# Patient Record
Sex: FEMALE | Race: WHITE | NOT HISPANIC OR LATINO | Employment: OTHER | URBAN - METROPOLITAN AREA
[De-identification: names, ages, dates, MRNs, and addresses within clinical notes are randomized per-mention and may not be internally consistent; named-entity substitution may affect disease eponyms.]

---

## 2018-10-27 ENCOUNTER — HOSPITAL ENCOUNTER (EMERGENCY)
Facility: HOSPITAL | Age: 53
Discharge: HOME | End: 2018-10-27
Attending: EMERGENCY MEDICINE
Payer: COMMERCIAL

## 2018-10-27 VITALS
HEIGHT: 66 IN | TEMPERATURE: 98 F | DIASTOLIC BLOOD PRESSURE: 89 MMHG | BODY MASS INDEX: 38.62 KG/M2 | HEART RATE: 93 BPM | OXYGEN SATURATION: 95 % | RESPIRATION RATE: 18 BRPM | WEIGHT: 240.31 LBS | SYSTOLIC BLOOD PRESSURE: 131 MMHG

## 2018-10-27 DIAGNOSIS — J20.9 BRONCHITIS, ACUTE, WITH BRONCHOSPASM: Primary | ICD-10-CM

## 2018-10-27 PROCEDURE — 99284 EMERGENCY DEPT VISIT MOD MDM: CPT | Mod: 25

## 2018-10-27 PROCEDURE — 94640 AIRWAY INHALATION TREATMENT: CPT

## 2018-10-27 PROCEDURE — 63600175 PHARM REV CODE 636 W HCPCS: Performed by: EMERGENCY MEDICINE

## 2018-10-27 PROCEDURE — 25000003 PHARM REV CODE 250: Performed by: EMERGENCY MEDICINE

## 2018-10-27 PROCEDURE — 25000242 PHARM REV CODE 250 ALT 637 W/ HCPCS: Performed by: EMERGENCY MEDICINE

## 2018-10-27 RX ORDER — LEVOFLOXACIN 500 MG/1
500 TABLET, FILM COATED ORAL DAILY
Qty: 10 TABLET | Refills: 0 | Status: SHIPPED | OUTPATIENT
Start: 2018-10-27 | End: 2018-11-06

## 2018-10-27 RX ORDER — LOSARTAN POTASSIUM AND HYDROCHLOROTHIAZIDE 12.5; 1 MG/1; MG/1
1 TABLET ORAL DAILY
Status: ON HOLD | COMMUNITY
End: 2022-12-19 | Stop reason: HOSPADM

## 2018-10-27 RX ORDER — METHOTREXATE 2.5 MG/1
TABLET ORAL
COMMUNITY
End: 2022-12-10 | Stop reason: SDUPTHER

## 2018-10-27 RX ORDER — ALBUTEROL SULFATE 90 UG/1
2 AEROSOL, METERED RESPIRATORY (INHALATION) EVERY 6 HOURS PRN
Qty: 18 G | Refills: 11 | Status: SHIPPED | OUTPATIENT
Start: 2018-10-27 | End: 2019-10-27

## 2018-10-27 RX ORDER — PREDNISONE 20 MG/1
60 TABLET ORAL
Status: COMPLETED | OUTPATIENT
Start: 2018-10-27 | End: 2018-10-27

## 2018-10-27 RX ORDER — IPRATROPIUM BROMIDE AND ALBUTEROL SULFATE 2.5; .5 MG/3ML; MG/3ML
3 SOLUTION RESPIRATORY (INHALATION)
Status: COMPLETED | OUTPATIENT
Start: 2018-10-27 | End: 2018-10-27

## 2018-10-27 RX ORDER — MOXIFLOXACIN HYDROCHLORIDE 400 MG/1
400 TABLET ORAL ONCE
Status: COMPLETED | OUTPATIENT
Start: 2018-10-27 | End: 2018-10-27

## 2018-10-27 RX ORDER — AMLODIPINE BESYLATE 5 MG/1
5 TABLET ORAL DAILY
Status: ON HOLD | COMMUNITY
End: 2022-12-19 | Stop reason: HOSPADM

## 2018-10-27 RX ORDER — METHYLPREDNISOLONE 4 MG/1
TABLET ORAL
Qty: 1 PACKAGE | Refills: 0 | Status: SHIPPED | OUTPATIENT
Start: 2018-10-27 | End: 2018-11-17

## 2018-10-27 RX ADMIN — IPRATROPIUM BROMIDE AND ALBUTEROL SULFATE 3 ML: .5; 3 SOLUTION RESPIRATORY (INHALATION) at 02:10

## 2018-10-27 RX ADMIN — PREDNISONE 60 MG: 20 TABLET ORAL at 02:10

## 2018-10-27 RX ADMIN — MOXIFLOXACIN HYDROCHLORIDE 400 MG: 400 TABLET, FILM COATED ORAL at 02:10

## 2018-10-27 NOTE — ED PROVIDER NOTES
SCRIBE #1 NOTE: I, Corrie Gucci, am scribing for, and in the presence of, Raj Ladd MD. I have scribed the entire note.         History     Chief Complaint   Patient presents with    Shortness of Breath     shortness of breath x 3 weeks       Review of patient's allergies indicates:  No Known Allergies      History of Present Illness   HPI    10/27/2018, 2:13 AM  History obtained from the patient      History of Present Illness: Becky Steen is a 53 y.o. female patient who presents to the Emergency Department for SOB which onset gradually 3 weeks ago. Patient reports being on abx a few weeks ago for bronchitis which improved sxs, but sxs have now returned. Symptoms are intermittent and moderate in severity. No mitigating or exacerbating factors reported. Associated sxs include cough and congestion. Patient states she is out of her inhaler. Patient denies any fever, chills, CP, leg swelling, dizziness, N/V, extremity weakness/numbness, and all other sxs at this time. No further complaints or concerns at this time.     Arrival mode: Personal vehicle     PCP: SANDEEP Perdue      Past Medical History:  Past medical history reviewed not relevant      Past Surgical History:  Past surgical history reviewed not relevant      Family History:  Family history reviewed not relevant      Social History:  Social History    Social History Main Topics    Social History Main Topics    Smoking status: Unknown if ever smoked    Smokeless tobacco: Unknown if ever used    Alcohol Use: Unknown drinking history    Drug Use: Unknown if ever used    Sexual Activity: Unknown        Review of Systems   Review of Systems   Constitutional: Negative for chills and fever.   HENT: Positive for congestion. Negative for sore throat.    Respiratory: Positive for cough and shortness of breath.    Cardiovascular: Negative for chest pain and leg swelling.   Gastrointestinal: Negative for nausea and vomiting.   Genitourinary:  "Negative for dysuria.   Musculoskeletal: Negative for back pain.   Skin: Negative for rash.   Neurological: Negative for dizziness, weakness and numbness.   Hematological: Does not bruise/bleed easily.   All other systems reviewed and are negative.       Physical Exam     Initial Vitals [10/27/18 0144]   BP Pulse Resp Temp SpO2   131/89 105 (!) 21 98 °F (36.7 °C) (!) 94 %      MAP       --          Physical Exam  Nursing Notes and Vital Signs Reviewed.  Constitutional: Patient is in no acute distress. Well-developed and well-nourished.  Head: Atraumatic. Normocephalic.  Eyes: PERRL. EOM intact. Conjunctivae are not pale. No scleral icterus.  ENT: Mucous membranes are moist. Oropharynx is clear and symmetric.    Neck: Supple. Full ROM. No lymphadenopathy.  Cardiovascular: Regular rate. Regular rhythm. No murmurs, rubs, or gallops. Distal pulses are 2+ and symmetric.  Pulmonary/Chest: No respiratory distress. Clear to auscultation bilaterally. No wheezing or rales.  Abdominal: Soft and non-distended.  There is no tenderness.  No rebound, guarding, or rigidity. Good bowel sounds.  Genitourinary: No CVA tenderness  Musculoskeletal: Moves all extremities. No obvious deformities. No edema. No calf tenderness.  Skin: Warm and dry.  Neurological:  Alert, awake, and appropriate.  Normal speech.  No acute focal neurological deficits are appreciated.  Psychiatric: Normal affect. Good eye contact. Appropriate in content.     ED Course   Procedures  ED Vital Signs:  Vitals:    10/27/18 0144   BP: 131/89   Pulse: 105   Resp: (!) 21   Temp: 98 °F (36.7 °C)   TempSrc: Oral   SpO2: (!) 94%   Weight: 109 kg (240 lb 4.8 oz)   Height: 5' 6" (1.676 m)               The Emergency Provider reviewed the vital signs and test results, which are outlined above.     ED Discussion     2:18 AM: Initial evaluation of patient. Will treat patient with abx secondary to patient recently being treated with abx of bronchitis and is high risk patient. " Discussed with pt all pertinent ED information. Discussed pt dx and plan of tx. Gave pt all f/u and return to the ED instructions. All questions and concerns were addressed at this time. Pt expresses understanding of information and instructions, and is comfortable with plan to discharge. Pt is stable for discharge.    I discussed with patient and/or family/caretaker that evaluation in the ED does not suggest any emergent or life threatening medical conditions requiring immediate intervention beyond what was provided in the ED, and I believe patient is safe for discharge.  Regardless, an unremarkable evaluation in the ED does not preclude the development or presence of a serious of life threatening condition. As such, patient was instructed to return immediately for any worsening or change in current symptoms.          ED Medication(s):  Medications   moxifloxacin tablet 400 mg (not administered)   albuterol-ipratropium 2.5 mg-0.5 mg/3 mL nebulizer solution 3 mL (not administered)   predniSONE tablet 60 mg (not administered)     Current Discharge Medication List      START taking these medications    Details   albuterol (PROVENTIL/VENTOLIN HFA) 90 mcg/actuation inhaler Inhale 2 puffs into the lungs every 6 (six) hours as needed for Wheezing.  Qty: 18 g, Refills: 11      levoFLOXacin (LEVAQUIN) 500 MG tablet Take 1 tablet (500 mg total) by mouth once daily. for 10 days  Qty: 10 tablet, Refills: 0      methylPREDNISolone (MEDROL DOSEPACK) 4 mg tablet Taper as directed  Qty: 1 Package, Refills: 0             Follow-up Information     SANDEEP Perdue. Call in 2 days.    Specialty:  Family Medicine  Contact information:  [address] [address] [phone]             [facility] - .    Specialty:  Emergency Medicine  Why:  If symptoms worsen  Contact information:  [address]  [facility] [address] [phone]                       Medical Decision Making                 Scribe Attestation:   Scribe #1: I performed the above scribed service and the documentation accurately describes the services I performed. I attest to the accuracy of the note.     Attending:   Physician Attestation Statement for Scribe #1: I, Raj Ladd MD, personally performed the services described in this documentation, as scribed by Corrie Duckworth, in my presence, and it is both accurate and complete.           Clinical Impression       ICD-10-CM ICD-9-CM   1. Bronchitis, acute, with bronchospasm J20.9 466.0       Disposition:   Disposition: Discharged  Condition: Stable         Raj Ladd MD  10/27/18 0439

## 2022-12-10 ENCOUNTER — HOSPITAL ENCOUNTER (INPATIENT)
Facility: HOSPITAL | Age: 57
LOS: 9 days | Discharge: HOME | DRG: 822 | End: 2022-12-19
Attending: EMERGENCY MEDICINE | Admitting: HOSPITALIST
Payer: COMMERCIAL

## 2022-12-10 DIAGNOSIS — R10.9 ABDOMINAL PAIN: ICD-10-CM

## 2022-12-10 DIAGNOSIS — R19.03 RIGHT LOWER QUADRANT ABDOMINAL MASS: ICD-10-CM

## 2022-12-10 DIAGNOSIS — Z97.8 NASOGASTRIC TUBE PRESENT: ICD-10-CM

## 2022-12-10 DIAGNOSIS — K56.609 INTESTINAL OBSTRUCTION, UNSPECIFIED CAUSE, UNSPECIFIED WHETHER PARTIAL OR COMPLETE: Primary | ICD-10-CM

## 2022-12-10 DIAGNOSIS — R07.9 CHEST PAIN: ICD-10-CM

## 2022-12-10 DIAGNOSIS — R53.81 DEBILITY: ICD-10-CM

## 2022-12-10 PROBLEM — I10 PRIMARY HYPERTENSION: Status: ACTIVE | Noted: 2022-12-10

## 2022-12-10 PROBLEM — K21.9 GASTROESOPHAGEAL REFLUX DISEASE WITHOUT ESOPHAGITIS: Status: ACTIVE | Noted: 2022-12-10

## 2022-12-10 PROBLEM — R10.84 GENERALIZED ABDOMINAL PAIN: Status: ACTIVE | Noted: 2022-12-10

## 2022-12-10 PROBLEM — Z79.4 TYPE 2 DIABETES MELLITUS, WITH LONG-TERM CURRENT USE OF INSULIN: Status: ACTIVE | Noted: 2022-12-10

## 2022-12-10 PROBLEM — E11.9 TYPE 2 DIABETES MELLITUS, WITH LONG-TERM CURRENT USE OF INSULIN: Status: ACTIVE | Noted: 2022-12-10

## 2022-12-10 PROBLEM — J45.909 ASTHMA: Status: ACTIVE | Noted: 2022-12-10

## 2022-12-10 PROBLEM — E66.01 CLASS 2 SEVERE OBESITY DUE TO EXCESS CALORIES WITH SERIOUS COMORBIDITY AND BODY MASS INDEX (BMI) OF 36.0 TO 36.9 IN ADULT: Status: ACTIVE | Noted: 2022-12-10

## 2022-12-10 LAB
ALBUMIN SERPL BCP-MCNC: 4.2 G/DL (ref 3.5–5.2)
ALP SERPL-CCNC: 74 U/L (ref 55–135)
ALT SERPL W/O P-5'-P-CCNC: 18 U/L (ref 10–44)
ANION GAP SERPL CALC-SCNC: 14 MMOL/L (ref 8–16)
AST SERPL-CCNC: 21 U/L (ref 10–40)
BACTERIA #/AREA URNS AUTO: ABNORMAL /HPF
BASOPHILS # BLD AUTO: 0.03 K/UL (ref 0–0.2)
BASOPHILS NFR BLD: 0.3 % (ref 0–1.9)
BILIRUB SERPL-MCNC: 1.7 MG/DL (ref 0.1–1)
BILIRUB UR QL STRIP: NEGATIVE
BNP SERPL-MCNC: <10 PG/ML (ref 0–99)
BUN SERPL-MCNC: 13 MG/DL (ref 6–20)
CALCIUM SERPL-MCNC: 10.2 MG/DL (ref 8.7–10.5)
CHLORIDE SERPL-SCNC: 102 MMOL/L (ref 95–110)
CLARITY UR REFRACT.AUTO: CLEAR
CO2 SERPL-SCNC: 21 MMOL/L (ref 23–29)
COLOR UR AUTO: YELLOW
CREAT SERPL-MCNC: 0.7 MG/DL (ref 0.5–1.4)
DIFFERENTIAL METHOD: ABNORMAL
EOSINOPHIL # BLD AUTO: 0.1 K/UL (ref 0–0.5)
EOSINOPHIL NFR BLD: 0.9 % (ref 0–8)
ERYTHROCYTE [DISTWIDTH] IN BLOOD BY AUTOMATED COUNT: 14 % (ref 11.5–14.5)
EST. GFR  (NO RACE VARIABLE): >60 ML/MIN/1.73 M^2
GLUCOSE SERPL-MCNC: 116 MG/DL (ref 70–110)
GLUCOSE UR QL STRIP: NEGATIVE
HCT VFR BLD AUTO: 41.8 % (ref 37–48.5)
HGB BLD-MCNC: 13.6 G/DL (ref 12–16)
HGB UR QL STRIP: NEGATIVE
IMM GRANULOCYTES # BLD AUTO: 0.02 K/UL (ref 0–0.04)
IMM GRANULOCYTES NFR BLD AUTO: 0.2 % (ref 0–0.5)
KETONES UR QL STRIP: NEGATIVE
LEUKOCYTE ESTERASE UR QL STRIP: ABNORMAL
LIPASE SERPL-CCNC: 7 U/L (ref 4–60)
LYMPHOCYTES # BLD AUTO: 1.2 K/UL (ref 1–4.8)
LYMPHOCYTES NFR BLD: 11.9 % (ref 18–48)
MCH RBC QN AUTO: 28.8 PG (ref 27–31)
MCHC RBC AUTO-ENTMCNC: 32.5 G/DL (ref 32–36)
MCV RBC AUTO: 88 FL (ref 82–98)
MICROSCOPIC COMMENT: ABNORMAL
MONOCYTES # BLD AUTO: 0.9 K/UL (ref 0.3–1)
MONOCYTES NFR BLD: 9 % (ref 4–15)
NEUTROPHILS # BLD AUTO: 7.6 K/UL (ref 1.8–7.7)
NEUTROPHILS NFR BLD: 77.7 % (ref 38–73)
NITRITE UR QL STRIP: NEGATIVE
NRBC BLD-RTO: 0 /100 WBC
PH UR STRIP: 6 [PH] (ref 5–8)
PLATELET # BLD AUTO: 377 K/UL (ref 150–450)
PMV BLD AUTO: 10.7 FL (ref 9.2–12.9)
POTASSIUM SERPL-SCNC: 3.8 MMOL/L (ref 3.5–5.1)
PROT SERPL-MCNC: 7.9 G/DL (ref 6–8.4)
PROT UR QL STRIP: ABNORMAL
RBC # BLD AUTO: 4.73 M/UL (ref 4–5.4)
RBC #/AREA URNS AUTO: 3 /HPF (ref 0–4)
SODIUM SERPL-SCNC: 137 MMOL/L (ref 136–145)
SP GR UR STRIP: 1.02 (ref 1–1.03)
SQUAMOUS #/AREA URNS AUTO: 10 /HPF
TROPONIN I SERPL DL<=0.01 NG/ML-MCNC: <0.006 NG/ML (ref 0–0.03)
URN SPEC COLLECT METH UR: ABNORMAL
UROBILINOGEN UR STRIP-ACNC: NEGATIVE EU/DL
WBC # BLD AUTO: 9.8 K/UL (ref 3.9–12.7)
WBC #/AREA URNS AUTO: 6 /HPF (ref 0–5)

## 2022-12-10 PROCEDURE — 85025 COMPLETE CBC W/AUTO DIFF WBC: CPT | Mod: ER | Performed by: EMERGENCY MEDICINE

## 2022-12-10 PROCEDURE — 25000003 PHARM REV CODE 250: Mod: ER | Performed by: INTERNAL MEDICINE

## 2022-12-10 PROCEDURE — 94761 N-INVAS EAR/PLS OXIMETRY MLT: CPT | Mod: ER

## 2022-12-10 PROCEDURE — 27000221 HC OXYGEN, UP TO 24 HOURS: Mod: ER

## 2022-12-10 PROCEDURE — 25000003 PHARM REV CODE 250: Mod: ER | Performed by: EMERGENCY MEDICINE

## 2022-12-10 PROCEDURE — 96361 HYDRATE IV INFUSION ADD-ON: CPT | Mod: ER

## 2022-12-10 PROCEDURE — 82378 CARCINOEMBRYONIC ANTIGEN: CPT | Performed by: SURGERY

## 2022-12-10 PROCEDURE — 86304 IMMUNOASSAY TUMOR CA 125: CPT | Performed by: SURGERY

## 2022-12-10 PROCEDURE — 99900035 HC TECH TIME PER 15 MIN (STAT): Mod: ER

## 2022-12-10 PROCEDURE — 96365 THER/PROPH/DIAG IV INF INIT: CPT | Mod: ER

## 2022-12-10 PROCEDURE — 63600175 PHARM REV CODE 636 W HCPCS: Performed by: HOSPITALIST

## 2022-12-10 PROCEDURE — 96375 TX/PRO/DX INJ NEW DRUG ADDON: CPT | Mod: ER

## 2022-12-10 PROCEDURE — 83880 ASSAY OF NATRIURETIC PEPTIDE: CPT | Mod: ER | Performed by: EMERGENCY MEDICINE

## 2022-12-10 PROCEDURE — 36415 COLL VENOUS BLD VENIPUNCTURE: CPT | Performed by: SURGERY

## 2022-12-10 PROCEDURE — 86803 HEPATITIS C AB TEST: CPT | Performed by: EMERGENCY MEDICINE

## 2022-12-10 PROCEDURE — 63600175 PHARM REV CODE 636 W HCPCS: Mod: ER | Performed by: INTERNAL MEDICINE

## 2022-12-10 PROCEDURE — 99285 EMERGENCY DEPT VISIT HI MDM: CPT | Mod: 25,ER

## 2022-12-10 PROCEDURE — 63600175 PHARM REV CODE 636 W HCPCS: Mod: ER | Performed by: EMERGENCY MEDICINE

## 2022-12-10 PROCEDURE — 25500020 PHARM REV CODE 255: Mod: ER | Performed by: EMERGENCY MEDICINE

## 2022-12-10 PROCEDURE — 87389 HIV-1 AG W/HIV-1&-2 AB AG IA: CPT | Performed by: EMERGENCY MEDICINE

## 2022-12-10 PROCEDURE — 84484 ASSAY OF TROPONIN QUANT: CPT | Mod: ER | Performed by: EMERGENCY MEDICINE

## 2022-12-10 PROCEDURE — 93005 ELECTROCARDIOGRAM TRACING: CPT | Mod: ER

## 2022-12-10 PROCEDURE — 93010 ELECTROCARDIOGRAM REPORT: CPT | Mod: ,,, | Performed by: INTERNAL MEDICINE

## 2022-12-10 PROCEDURE — 83690 ASSAY OF LIPASE: CPT | Mod: ER | Performed by: EMERGENCY MEDICINE

## 2022-12-10 PROCEDURE — 81000 URINALYSIS NONAUTO W/SCOPE: CPT | Mod: ER | Performed by: EMERGENCY MEDICINE

## 2022-12-10 PROCEDURE — 11000001 HC ACUTE MED/SURG PRIVATE ROOM

## 2022-12-10 PROCEDURE — 80053 COMPREHEN METABOLIC PANEL: CPT | Mod: ER | Performed by: EMERGENCY MEDICINE

## 2022-12-10 PROCEDURE — 93010 EKG 12-LEAD: ICD-10-PCS | Mod: ,,, | Performed by: INTERNAL MEDICINE

## 2022-12-10 RX ORDER — MONTELUKAST SODIUM 10 MG/1
TABLET ORAL
COMMUNITY

## 2022-12-10 RX ORDER — INSULIN DETEMIR 100 [IU]/ML
INJECTION, SOLUTION SUBCUTANEOUS
Status: ON HOLD | COMMUNITY
Start: 2022-11-27 | End: 2022-12-19 | Stop reason: HOSPADM

## 2022-12-10 RX ORDER — ERGOCALCIFEROL 1.25 MG/1
CAPSULE ORAL
COMMUNITY

## 2022-12-10 RX ORDER — ONDANSETRON 2 MG/ML
4 INJECTION INTRAMUSCULAR; INTRAVENOUS
Status: COMPLETED | OUTPATIENT
Start: 2022-12-10 | End: 2022-12-10

## 2022-12-10 RX ORDER — ACETAMINOPHEN 650 MG/1
650 SUPPOSITORY RECTAL EVERY 6 HOURS PRN
Status: DISCONTINUED | OUTPATIENT
Start: 2022-12-10 | End: 2022-12-13

## 2022-12-10 RX ORDER — ENOXAPARIN SODIUM 100 MG/ML
40 INJECTION SUBCUTANEOUS EVERY 24 HOURS
Status: DISCONTINUED | OUTPATIENT
Start: 2022-12-11 | End: 2022-12-19 | Stop reason: HOSPADM

## 2022-12-10 RX ORDER — MORPHINE SULFATE 2 MG/ML
2 INJECTION, SOLUTION INTRAMUSCULAR; INTRAVENOUS EVERY 4 HOURS PRN
Status: DISCONTINUED | OUTPATIENT
Start: 2022-12-10 | End: 2022-12-11

## 2022-12-10 RX ORDER — IPRATROPIUM BROMIDE AND ALBUTEROL SULFATE 2.5; .5 MG/3ML; MG/3ML
3 SOLUTION RESPIRATORY (INHALATION) EVERY 6 HOURS PRN
Status: DISCONTINUED | OUTPATIENT
Start: 2022-12-10 | End: 2022-12-19 | Stop reason: HOSPADM

## 2022-12-10 RX ORDER — HYDROCODONE BITARTRATE AND ACETAMINOPHEN 5; 325 MG/1; MG/1
1 TABLET ORAL EVERY 6 HOURS PRN
Status: DISCONTINUED | OUTPATIENT
Start: 2022-12-10 | End: 2022-12-11

## 2022-12-10 RX ORDER — LORATADINE 10 MG/1
TABLET ORAL
COMMUNITY

## 2022-12-10 RX ORDER — CELECOXIB 200 MG/1
CAPSULE ORAL
Status: ON HOLD | COMMUNITY
End: 2022-12-19 | Stop reason: HOSPADM

## 2022-12-10 RX ORDER — TALC
6 POWDER (GRAM) TOPICAL NIGHTLY PRN
Status: DISCONTINUED | OUTPATIENT
Start: 2022-12-10 | End: 2022-12-19 | Stop reason: HOSPADM

## 2022-12-10 RX ORDER — ATORVASTATIN CALCIUM 20 MG/1
20 TABLET, FILM COATED ORAL
COMMUNITY
Start: 2022-10-15

## 2022-12-10 RX ORDER — ONDANSETRON 2 MG/ML
4 INJECTION INTRAMUSCULAR; INTRAVENOUS EVERY 8 HOURS PRN
Status: DISCONTINUED | OUTPATIENT
Start: 2022-12-10 | End: 2022-12-19 | Stop reason: HOSPADM

## 2022-12-10 RX ORDER — IBUPROFEN 200 MG
16 TABLET ORAL
Status: DISCONTINUED | OUTPATIENT
Start: 2022-12-10 | End: 2022-12-19 | Stop reason: HOSPADM

## 2022-12-10 RX ORDER — IBUPROFEN 200 MG
24 TABLET ORAL
Status: DISCONTINUED | OUTPATIENT
Start: 2022-12-10 | End: 2022-12-19 | Stop reason: HOSPADM

## 2022-12-10 RX ORDER — NALOXONE HCL 0.4 MG/ML
0.02 VIAL (ML) INJECTION
Status: DISCONTINUED | OUTPATIENT
Start: 2022-12-10 | End: 2022-12-19 | Stop reason: HOSPADM

## 2022-12-10 RX ORDER — ACETAMINOPHEN 325 MG/1
650 TABLET ORAL EVERY 8 HOURS PRN
Status: DISCONTINUED | OUTPATIENT
Start: 2022-12-10 | End: 2022-12-19 | Stop reason: HOSPADM

## 2022-12-10 RX ORDER — SODIUM CHLORIDE 9 MG/ML
1000 INJECTION, SOLUTION INTRAVENOUS
Status: COMPLETED | OUTPATIENT
Start: 2022-12-10 | End: 2022-12-10

## 2022-12-10 RX ORDER — HYDROCODONE BITARTRATE AND ACETAMINOPHEN 7.5; 325 MG/1; MG/1
TABLET ORAL
Status: ON HOLD | COMMUNITY
Start: 2022-07-06 | End: 2022-12-19 | Stop reason: HOSPADM

## 2022-12-10 RX ORDER — METFORMIN HYDROCHLORIDE 500 MG/1
TABLET, EXTENDED RELEASE ORAL
COMMUNITY

## 2022-12-10 RX ORDER — GLUCAGON 1 MG
1 KIT INJECTION
Status: DISCONTINUED | OUTPATIENT
Start: 2022-12-10 | End: 2022-12-19 | Stop reason: HOSPADM

## 2022-12-10 RX ORDER — SODIUM CHLORIDE 0.9 % (FLUSH) 0.9 %
3 SYRINGE (ML) INJECTION EVERY 12 HOURS PRN
Status: DISCONTINUED | OUTPATIENT
Start: 2022-12-10 | End: 2022-12-19 | Stop reason: HOSPADM

## 2022-12-10 RX ORDER — MORPHINE SULFATE 4 MG/ML
4 INJECTION, SOLUTION INTRAMUSCULAR; INTRAVENOUS
Status: ACTIVE | OUTPATIENT
Start: 2022-12-10 | End: 2022-12-10

## 2022-12-10 RX ORDER — FLUTICASONE PROPIONATE 50 MCG
SPRAY, SUSPENSION (ML) NASAL
COMMUNITY
Start: 2022-10-05

## 2022-12-10 RX ORDER — OMEPRAZOLE 40 MG/1
CAPSULE, DELAYED RELEASE ORAL
COMMUNITY

## 2022-12-10 RX ORDER — PROMETHAZINE HYDROCHLORIDE 25 MG/1
25 TABLET ORAL EVERY 6 HOURS PRN
Status: DISCONTINUED | OUTPATIENT
Start: 2022-12-10 | End: 2022-12-19 | Stop reason: HOSPADM

## 2022-12-10 RX ORDER — HYDROMORPHONE HYDROCHLORIDE 2 MG/ML
0.5 INJECTION, SOLUTION INTRAMUSCULAR; INTRAVENOUS; SUBCUTANEOUS
Status: COMPLETED | OUTPATIENT
Start: 2022-12-10 | End: 2022-12-10

## 2022-12-10 RX ORDER — BUDESONIDE AND FORMOTEROL FUMARATE DIHYDRATE 160; 4.5 UG/1; UG/1
AEROSOL RESPIRATORY (INHALATION)
COMMUNITY

## 2022-12-10 RX ORDER — SODIUM CHLORIDE, SODIUM LACTATE, POTASSIUM CHLORIDE, CALCIUM CHLORIDE 600; 310; 30; 20 MG/100ML; MG/100ML; MG/100ML; MG/100ML
INJECTION, SOLUTION INTRAVENOUS CONTINUOUS
Status: DISCONTINUED | OUTPATIENT
Start: 2022-12-10 | End: 2022-12-14

## 2022-12-10 RX ORDER — MAG HYDROX/ALUMINUM HYD/SIMETH 200-200-20
30 SUSPENSION, ORAL (FINAL DOSE FORM) ORAL 4 TIMES DAILY PRN
Status: DISCONTINUED | OUTPATIENT
Start: 2022-12-10 | End: 2022-12-19 | Stop reason: HOSPADM

## 2022-12-10 RX ADMIN — BENZOCAINE: 200 SPRAY DENTAL; ORAL; PERIODONTAL at 07:12

## 2022-12-10 RX ADMIN — SODIUM CHLORIDE 1000 ML: 0.9 INJECTION, SOLUTION INTRAVENOUS at 12:12

## 2022-12-10 RX ADMIN — HYDROMORPHONE HYDROCHLORIDE 0.5 MG: 2 INJECTION, SOLUTION INTRAMUSCULAR; INTRAVENOUS; SUBCUTANEOUS at 03:12

## 2022-12-10 RX ADMIN — SODIUM CHLORIDE, POTASSIUM CHLORIDE, SODIUM LACTATE AND CALCIUM CHLORIDE: 600; 310; 30; 20 INJECTION, SOLUTION INTRAVENOUS at 11:12

## 2022-12-10 RX ADMIN — IOHEXOL 100 ML: 350 INJECTION, SOLUTION INTRAVENOUS at 01:12

## 2022-12-10 RX ADMIN — PROMETHAZINE HYDROCHLORIDE 12.5 MG: 25 INJECTION INTRAMUSCULAR; INTRAVENOUS at 03:12

## 2022-12-10 RX ADMIN — ONDANSETRON 4 MG: 2 INJECTION INTRAMUSCULAR; INTRAVENOUS at 07:12

## 2022-12-10 RX ADMIN — SODIUM CHLORIDE 1000 ML: 0.9 INJECTION, SOLUTION INTRAVENOUS at 03:12

## 2022-12-10 RX ADMIN — ONDANSETRON 4 MG: 2 INJECTION INTRAMUSCULAR; INTRAVENOUS at 12:12

## 2022-12-10 NOTE — Clinical Note
Diagnosis: Abdominal pain [570716]   Admitting Provider:: ALONDRA IRIZARRY [[id number]]   Future Attending Provider: ALONDRA IRIZARRY [[id number]]   Reason for IP Medical Treatment  (Clinical interventions that can only be accomplished in the IP setting? ) :: Obstruction   Estimated Length of Stay:: 2 midnights   I certify that Inpatient services for greater than or equal to 2 midnights are medically necessary:: Yes   Plans for Post-Acute care--if anticipated (pick the single best option):: A. No post acute care anticipated at this time   Special Needs:: No Special Needs [1]

## 2022-12-10 NOTE — ED PROVIDER NOTES
Encounter Date: 12/10/2022       History     Chief Complaint   Patient presents with    Abdominal Pain     Upper abd pain that radiates down to the bottom of abd area, nausea, onset 2 weeks ago      The history is provided by the patient.   Abdominal Pain  The current episode started several days ago. The onset of the illness was gradual. The problem has not changed since onset.The abdominal pain is generalized. The abdominal pain does not radiate. The severity of the abdominal pain is 7/10. The abdominal pain is relieved by nothing. The other symptoms of the illness include nausea and vomiting. The other symptoms of the illness do not include fever, fatigue, jaundice, melena, diarrhea or dysuria.   Symptoms associated with the illness do not include back pain.   Review of patient's allergies indicates:  No Known Allergies  No past medical history on file.  No past surgical history on file.  No family history on file.     Review of Systems   Constitutional:  Negative for fatigue and fever.   HENT:  Negative for congestion.    Respiratory:  Negative for cough.    Cardiovascular:  Negative for chest pain.   Gastrointestinal:  Positive for abdominal pain, nausea and vomiting. Negative for diarrhea, jaundice and melena.   Genitourinary:  Negative for dysuria.   Musculoskeletal:  Negative for back pain.   Hematological:  Does not bruise/bleed easily.     Physical Exam     Initial Vitals [12/10/22 1135]   BP Pulse Resp Temp SpO2   120/80 (!) 115 20 98.4 °F (36.9 °C) (!) 94 %      MAP       --         Physical Exam    Nursing note and vitals reviewed.  Constitutional: She appears well-developed and well-nourished. No distress.   HENT:   Head: Normocephalic and atraumatic.   Mouth/Throat: Oropharynx is clear and moist.   Eyes: Conjunctivae and EOM are normal. Pupils are equal, round, and reactive to light.   Neck: Neck supple.   Normal range of motion.  Cardiovascular:  Normal rate, regular rhythm and normal heart sounds.            Pulmonary/Chest: Breath sounds normal. No respiratory distress.   Abdominal: Abdomen is soft. Bowel sounds are normal. She exhibits no distension. There is abdominal tenderness in the periumbilical area.   Musculoskeletal:         General: Normal range of motion.      Cervical back: Normal range of motion and neck supple.     Neurological: She is alert and oriented to person, place, and time. She has normal strength.   Skin: Skin is warm and dry.   Psychiatric: She has a normal mood and affect. Thought content normal.       ED Course   Procedures  Labs Reviewed   CBC W/ AUTO DIFFERENTIAL - Abnormal; Notable for the following components:       Result Value    Gran % 77.7 (*)     Lymph % 11.9 (*)     All other components within normal limits    Narrative:     Release to patient->Immediate   COMPREHENSIVE METABOLIC PANEL - Abnormal; Notable for the following components:    CO2 21 (*)     Glucose 116 (*)     Total Bilirubin 1.7 (*)     All other components within normal limits    Narrative:     Release to patient->Immediate   URINALYSIS, REFLEX TO URINE CULTURE - Abnormal; Notable for the following components:    Protein, UA Trace (*)     Leukocytes, UA Trace (*)     All other components within normal limits    Narrative:     Specimen Source->Urine   URINALYSIS MICROSCOPIC - Abnormal; Notable for the following components:    WBC, UA 6 (*)     Bacteria Few (*)     All other components within normal limits    Narrative:     Specimen Source->Urine   LIPASE    Narrative:     Release to patient->Immediate   TROPONIN I    Narrative:     Release to patient->Immediate   B-TYPE NATRIURETIC PEPTIDE    Narrative:     Release to patient->Immediate   HIV 1 / 2 ANTIBODY   HEPATITIS C ANTIBODY   HEP C VIRUS HOLD SPECIMEN     Results for orders placed or performed during the hospital encounter of 12/10/22   CBC W/ AUTO DIFFERENTIAL   Result Value Ref Range    WBC 9.80 3.90 - 12.70 K/uL    RBC 4.73 4.00 - 5.40 M/uL    Hemoglobin  13.6 12.0 - 16.0 g/dL    Hematocrit 41.8 37.0 - 48.5 %    MCV 88 82 - 98 fL    MCH 28.8 27.0 - 31.0 pg    MCHC 32.5 32.0 - 36.0 g/dL    RDW 14.0 11.5 - 14.5 %    Platelets 377 150 - 450 K/uL    MPV 10.7 9.2 - 12.9 fL    Immature Granulocytes 0.2 0.0 - 0.5 %    Gran # (ANC) 7.6 1.8 - 7.7 K/uL    Immature Grans (Abs) 0.02 0.00 - 0.04 K/uL    Lymph # 1.2 1.0 - 4.8 K/uL    Mono # 0.9 0.3 - 1.0 K/uL    Eos # 0.1 0.0 - 0.5 K/uL    Baso # 0.03 0.00 - 0.20 K/uL    nRBC 0 0 /100 WBC    Gran % 77.7 (H) 38.0 - 73.0 %    Lymph % 11.9 (L) 18.0 - 48.0 %    Mono % 9.0 4.0 - 15.0 %    Eosinophil % 0.9 0.0 - 8.0 %    Basophil % 0.3 0.0 - 1.9 %    Differential Method Automated    Comp. Metabolic Panel   Result Value Ref Range    Sodium 137 136 - 145 mmol/L    Potassium 3.8 3.5 - 5.1 mmol/L    Chloride 102 95 - 110 mmol/L    CO2 21 (L) 23 - 29 mmol/L    Glucose 116 (H) 70 - 110 mg/dL    BUN 13 6 - 20 mg/dL    Creatinine 0.7 0.5 - 1.4 mg/dL    Calcium 10.2 8.7 - 10.5 mg/dL    Total Protein 7.9 6.0 - 8.4 g/dL    Albumin 4.2 3.5 - 5.2 g/dL    Total Bilirubin 1.7 (H) 0.1 - 1.0 mg/dL    Alkaline Phosphatase 74 55 - 135 U/L    AST 21 10 - 40 U/L    ALT 18 10 - 44 U/L    Anion Gap 14 8 - 16 mmol/L    eGFR >60.0 >60 mL/min/1.73 m^2   Lipase   Result Value Ref Range    Lipase 7 4 - 60 U/L   Urinalysis, Reflex to Urine Culture Urine, Clean Catch    Specimen: Urine   Result Value Ref Range    Specimen UA Urine, Clean Catch     Color, UA Yellow Yellow, Straw, Yadira    Appearance, UA Clear Clear    pH, UA 6.0 5.0 - 8.0    Specific Gravity, UA 1.020 1.005 - 1.030    Protein, UA Trace (A) Negative    Glucose, UA Negative Negative    Ketones, UA Negative Negative    Bilirubin (UA) Negative Negative    Occult Blood UA Negative Negative    Nitrite, UA Negative Negative    Urobilinogen, UA Negative <2.0 EU/dL    Leukocytes, UA Trace (A) Negative   Troponin I   Result Value Ref Range    Troponin I <0.006 0.000 - 0.026 ng/mL   Brain natriuretic peptide    Result Value Ref Range    BNP <10 0 - 99 pg/mL   Urinalysis Microscopic   Result Value Ref Range    RBC, UA 3 0 - 4 /hpf    WBC, UA 6 (H) 0 - 5 /hpf    Bacteria Few (A) None-Occ /hpf    Squam Epithel, UA 10 /hpf    Microscopic Comment SEE COMMENT             Imaging Results              X-Ray Chest 1 View (In process)                      CT Abdomen Pelvis With Contrast (Final result)  Result time 12/10/22 14:31:42      Final result by Yeison Aldridge MD (12/10/22 14:31:42)                   Impression:      11 cm soft tissue mass either arising from the cecum or distal small bowel consistent with malignancy.  The mass causes a high-grade distal mechanical small bowel obstruction.  Positive for peritoneal carcinomatosis.    All CT scans at this facility are performed  using dose modulation techniques as appropriate to performed exam including the following:  automated exposure control; adjustment of mA and/or kV according to the patients size (this includes techniques or standardized protocols for targeted exams where dose is matched to indication/reason for exam: i.e. extremities or head);  iterative reconstruction technique.      Electronically signed by: Yeison Aldridge MD  Date:    12/10/2022  Time:    14:31               Narrative:    EXAMINATION:  CT ABDOMEN PELVIS WITH CONTRAST    CLINICAL HISTORY:  Abdominal pain, acute, nonlocalized;    TECHNIQUE:  Axial CT imaging was performed through the abdomen and pelvis with  75cc  of intravenous contrast. Multiplanar reformats were performed and interpreted.    COMPARISON:  None    FINDINGS:  Clustered micronodular opacities in the right lower lobe, likely infectious.    There is an 11.7 x 11.1 x 9.9 cm soft tissue mass located in the right lower quadrant that either arises from the cecum or a distal small bowel loops that results in high-grade mechanical small bowel obstruction.  Upstream moderate dilatation of multiple loops of small bowel with air-fluid levels.  There  are scattered soft tissue nodules throughout the right side of the mesentery and in the pelvis consistent with peritoneal carcinomatosis.  Trace perihepatic ascites.  No free air or abscess.    The gallbladder is unremarkable.    The abdominal aorta is normal in caliber.    The urinary bladder is unremarkable.    No significant osseous abnormality is identified.                                  3:33 PM Surgery Consult- discussed c Dr Davis, recs NGT, IVFs admit to Medicine    3:34 PM Discussed lab/imaging studies with patient and the need for further evaluation/admission for Bowel Obstruction ass c Mass. Pt verbalized understanding that this is a stand alone ER and we are unable to admit at this facility. Pt will be transferred to [facility] via [facility] with care en route to include IVFs. I discussed this case with HS and care was accepted by Dr Padilla.       Medications   morphine injection 4 mg (4 mg Intravenous Not Given 12/10/22 1215)   0.9%  NaCl infusion (has no administration in time range)   sodium chloride 0.9% bolus 1,000 mL (0 mLs Intravenous Stopped 12/10/22 1330)   ondansetron injection 4 mg (4 mg Intravenous Given 12/10/22 1216)   iohexoL (OMNIPAQUE 350) injection 100 mL (100 mLs Intravenous Given 12/10/22 1332)   promethazine (PHENERGAN) 12.5 mg in dextrose 5 % 50 mL IVPB (0 mg Intravenous Stopped 12/10/22 1525)   HYDROmorphone (PF) injection 0.5 mg (0.5 mg Intravenous Given 12/10/22 1513)                              Clinical Impression:   Final diagnoses:  [R10.9] Abdominal pain  [K56.609] Intestinal obstruction, unspecified cause, unspecified whether partial or complete (Primary)  [Z97.8] Nasogastric tube present  [R19.03] Right lower quadrant abdominal mass        ED Disposition Condition    Admit Stable                Raj Ladd MD  12/10/22 1539

## 2022-12-11 ENCOUNTER — ANESTHESIA EVENT (OUTPATIENT)
Dept: SURGERY | Facility: HOSPITAL | Age: 57
DRG: 822 | End: 2022-12-11
Payer: COMMERCIAL

## 2022-12-11 ENCOUNTER — ANESTHESIA (OUTPATIENT)
Dept: SURGERY | Facility: HOSPITAL | Age: 57
DRG: 822 | End: 2022-12-11
Payer: COMMERCIAL

## 2022-12-11 PROBLEM — R11.2 NAUSEA AND VOMITING: Status: ACTIVE | Noted: 2022-12-11

## 2022-12-11 PROBLEM — R19.03 RIGHT LOWER QUADRANT ABDOMINAL MASS: Status: ACTIVE | Noted: 2022-12-11

## 2022-12-11 LAB
ALBUMIN SERPL BCP-MCNC: 3.4 G/DL (ref 3.5–5.2)
ALP SERPL-CCNC: 65 U/L (ref 55–135)
ALT SERPL W/O P-5'-P-CCNC: 12 U/L (ref 10–44)
ANION GAP SERPL CALC-SCNC: 9 MMOL/L (ref 8–16)
AST SERPL-CCNC: 17 U/L (ref 10–40)
BASOPHILS # BLD AUTO: 0.04 K/UL (ref 0–0.2)
BASOPHILS NFR BLD: 0.5 % (ref 0–1.9)
BILIRUB SERPL-MCNC: 1.6 MG/DL (ref 0.1–1)
BUN SERPL-MCNC: 12 MG/DL (ref 6–20)
CALCIUM SERPL-MCNC: 8.9 MG/DL (ref 8.7–10.5)
CANCER AG125 SERPL-ACNC: 31 U/ML (ref 0–30)
CEA SERPL-MCNC: <1.7 NG/ML (ref 0–5)
CHLORIDE SERPL-SCNC: 104 MMOL/L (ref 95–110)
CO2 SERPL-SCNC: 26 MMOL/L (ref 23–29)
CREAT SERPL-MCNC: 0.7 MG/DL (ref 0.5–1.4)
DIFFERENTIAL METHOD: ABNORMAL
EOSINOPHIL # BLD AUTO: 0.4 K/UL (ref 0–0.5)
EOSINOPHIL NFR BLD: 4.8 % (ref 0–8)
ERYTHROCYTE [DISTWIDTH] IN BLOOD BY AUTOMATED COUNT: 14.3 % (ref 11.5–14.5)
EST. GFR  (NO RACE VARIABLE): >60 ML/MIN/1.73 M^2
ESTIMATED AVG GLUCOSE: 123 MG/DL (ref 68–131)
GLUCOSE SERPL-MCNC: 95 MG/DL (ref 70–110)
HBA1C MFR BLD: 5.9 % (ref 4–5.6)
HCT VFR BLD AUTO: 37.7 % (ref 37–48.5)
HCV AB SERPL QL IA: NEGATIVE
HEP C VIRUS HOLD SPECIMEN: NORMAL
HGB BLD-MCNC: 12 G/DL (ref 12–16)
HIV 1+2 AB+HIV1 P24 AG SERPL QL IA: NEGATIVE
IMM GRANULOCYTES # BLD AUTO: 0.03 K/UL (ref 0–0.04)
IMM GRANULOCYTES NFR BLD AUTO: 0.4 % (ref 0–0.5)
LYMPHOCYTES # BLD AUTO: 1.5 K/UL (ref 1–4.8)
LYMPHOCYTES NFR BLD: 19.7 % (ref 18–48)
MCH RBC QN AUTO: 28.7 PG (ref 27–31)
MCHC RBC AUTO-ENTMCNC: 31.8 G/DL (ref 32–36)
MCV RBC AUTO: 90 FL (ref 82–98)
MONOCYTES # BLD AUTO: 0.9 K/UL (ref 0.3–1)
MONOCYTES NFR BLD: 11.7 % (ref 4–15)
NEUTROPHILS # BLD AUTO: 4.7 K/UL (ref 1.8–7.7)
NEUTROPHILS NFR BLD: 62.9 % (ref 38–73)
NRBC BLD-RTO: 0 /100 WBC
PLATELET # BLD AUTO: 316 K/UL (ref 150–450)
PMV BLD AUTO: 10.2 FL (ref 9.2–12.9)
POCT GLUCOSE: 108 MG/DL (ref 70–110)
POCT GLUCOSE: 125 MG/DL (ref 70–110)
POCT GLUCOSE: 136 MG/DL (ref 70–110)
POCT GLUCOSE: 143 MG/DL (ref 70–110)
POCT GLUCOSE: 87 MG/DL (ref 70–110)
POTASSIUM SERPL-SCNC: 4.1 MMOL/L (ref 3.5–5.1)
PROT SERPL-MCNC: 6.3 G/DL (ref 6–8.4)
RBC # BLD AUTO: 4.18 M/UL (ref 4–5.4)
SODIUM SERPL-SCNC: 139 MMOL/L (ref 136–145)
WBC # BLD AUTO: 7.53 K/UL (ref 3.9–12.7)

## 2022-12-11 PROCEDURE — 37000009 HC ANESTHESIA EA ADD 15 MINS: Performed by: SURGERY

## 2022-12-11 PROCEDURE — 44130 BOWEL TO BOWEL FUSION: CPT | Mod: 51,80,, | Performed by: SURGERY

## 2022-12-11 PROCEDURE — 49204 PR EXCISION/DESTRUCTION OPEN ABDOMINAL TUMORS 5.1-10.0 CM: CPT | Mod: 80,,, | Performed by: SURGERY

## 2022-12-11 PROCEDURE — 63600175 PHARM REV CODE 636 W HCPCS: Performed by: SURGERY

## 2022-12-11 PROCEDURE — 88341 PR IHC OR ICC EACH ADD'L SINGLE ANTIBODY  STAINPR: ICD-10-PCS | Mod: 26,,, | Performed by: PATHOLOGY

## 2022-12-11 PROCEDURE — 63600175 PHARM REV CODE 636 W HCPCS: Performed by: ANESTHESIOLOGY

## 2022-12-11 PROCEDURE — 88305 TISSUE EXAM BY PATHOLOGIST: CPT | Performed by: PATHOLOGY

## 2022-12-11 PROCEDURE — 49204 PR EXCISION/DESTRUCTION OPEN ABDOMINAL TUMORS 5.1-10.0 CM: ICD-10-PCS | Mod: ,,, | Performed by: SURGERY

## 2022-12-11 PROCEDURE — 27000221 HC OXYGEN, UP TO 24 HOURS

## 2022-12-11 PROCEDURE — 99223 PR INITIAL HOSPITAL CARE,LEVL III: ICD-10-PCS | Mod: ,,, | Performed by: INTERNAL MEDICINE

## 2022-12-11 PROCEDURE — 94799 UNLISTED PULMONARY SVC/PX: CPT

## 2022-12-11 PROCEDURE — C9290 INJ, BUPIVACAINE LIPOSOME: HCPCS | Performed by: SURGERY

## 2022-12-11 PROCEDURE — 36000707: Performed by: SURGERY

## 2022-12-11 PROCEDURE — 80053 COMPREHEN METABOLIC PANEL: CPT | Performed by: HOSPITALIST

## 2022-12-11 PROCEDURE — 88341 IMHCHEM/IMCYTCHM EA ADD ANTB: CPT | Performed by: PATHOLOGY

## 2022-12-11 PROCEDURE — C1769 GUIDE WIRE: HCPCS | Performed by: SURGERY

## 2022-12-11 PROCEDURE — 85025 COMPLETE CBC W/AUTO DIFF WBC: CPT | Performed by: HOSPITALIST

## 2022-12-11 PROCEDURE — 52332 PR CYSTOSCOPY,INSERT URETERAL STENT: ICD-10-PCS | Mod: RT,,, | Performed by: UROLOGY

## 2022-12-11 PROCEDURE — 49204 PR EXCISION/DESTRUCTION OPEN ABDOMINAL TUMORS 5.1-10.0 CM: CPT | Mod: ,,, | Performed by: SURGERY

## 2022-12-11 PROCEDURE — 27201423 OPTIME MED/SURG SUP & DEVICES STERILE SUPPLY: Performed by: SURGERY

## 2022-12-11 PROCEDURE — 88341 IMHCHEM/IMCYTCHM EA ADD ANTB: CPT | Mod: 59 | Performed by: PATHOLOGY

## 2022-12-11 PROCEDURE — 99900035 HC TECH TIME PER 15 MIN (STAT)

## 2022-12-11 PROCEDURE — 99223 1ST HOSP IP/OBS HIGH 75: CPT | Mod: ,,, | Performed by: INTERNAL MEDICINE

## 2022-12-11 PROCEDURE — 25000003 PHARM REV CODE 250: Performed by: FAMILY MEDICINE

## 2022-12-11 PROCEDURE — 49204 PR EXCISION/DESTRUCTION OPEN ABDOMINAL TUMORS 5.1-10.0 CM: ICD-10-PCS | Mod: 80,,, | Performed by: SURGERY

## 2022-12-11 PROCEDURE — 37000008 HC ANESTHESIA 1ST 15 MINUTES: Performed by: SURGERY

## 2022-12-11 PROCEDURE — 63600175 PHARM REV CODE 636 W HCPCS: Performed by: FAMILY MEDICINE

## 2022-12-11 PROCEDURE — 25000003 PHARM REV CODE 250: Performed by: NURSE PRACTITIONER

## 2022-12-11 PROCEDURE — 88342 CHG IMMUNOCYTOCHEMISTRY: ICD-10-PCS | Mod: 26,,, | Performed by: PATHOLOGY

## 2022-12-11 PROCEDURE — 88342 IMHCHEM/IMCYTCHM 1ST ANTB: CPT | Performed by: PATHOLOGY

## 2022-12-11 PROCEDURE — 83036 HEMOGLOBIN GLYCOSYLATED A1C: CPT | Performed by: HOSPITALIST

## 2022-12-11 PROCEDURE — 44130 PR BOWEL TO BOWEL ANASTOMOSIS: ICD-10-PCS | Mod: 51,,, | Performed by: SURGERY

## 2022-12-11 PROCEDURE — 25000003 PHARM REV CODE 250: Performed by: HOSPITALIST

## 2022-12-11 PROCEDURE — 88377 M/PHMTRC ALYS ISHQUANT/SEMIQ: CPT | Performed by: PATHOLOGY

## 2022-12-11 PROCEDURE — 74420 PR  X-RAY RETROGRADE PYELOGRAM: ICD-10-PCS | Mod: 26,,, | Performed by: UROLOGY

## 2022-12-11 PROCEDURE — 44130 PR BOWEL TO BOWEL ANASTOMOSIS: ICD-10-PCS | Mod: 51,80,, | Performed by: SURGERY

## 2022-12-11 PROCEDURE — 88365 INSITU HYBRIDIZATION (FISH): CPT | Performed by: PATHOLOGY

## 2022-12-11 PROCEDURE — 36000706: Performed by: SURGERY

## 2022-12-11 PROCEDURE — 44130 BOWEL TO BOWEL FUSION: CPT | Mod: 51,,, | Performed by: SURGERY

## 2022-12-11 PROCEDURE — 36415 COLL VENOUS BLD VENIPUNCTURE: CPT | Performed by: HOSPITALIST

## 2022-12-11 PROCEDURE — 74420 UROGRAPHY RTRGR +-KUB: CPT | Mod: 26,,, | Performed by: UROLOGY

## 2022-12-11 PROCEDURE — 99223 1ST HOSP IP/OBS HIGH 75: CPT | Mod: 57,,, | Performed by: SURGERY

## 2022-12-11 PROCEDURE — 11000001 HC ACUTE MED/SURG PRIVATE ROOM

## 2022-12-11 PROCEDURE — 71000033 HC RECOVERY, INTIAL HOUR: Performed by: SURGERY

## 2022-12-11 PROCEDURE — 52332 CYSTOSCOPY AND TREATMENT: CPT | Mod: RT,,, | Performed by: UROLOGY

## 2022-12-11 PROCEDURE — 88342 IMHCHEM/IMCYTCHM 1ST ANTB: CPT | Mod: 91 | Performed by: PATHOLOGY

## 2022-12-11 PROCEDURE — 94761 N-INVAS EAR/PLS OXIMETRY MLT: CPT

## 2022-12-11 PROCEDURE — 88342 IMHCHEM/IMCYTCHM 1ST ANTB: CPT | Mod: 26,,, | Performed by: PATHOLOGY

## 2022-12-11 PROCEDURE — 88305 TISSUE EXAM BY PATHOLOGIST: CPT | Mod: 26,,, | Performed by: PATHOLOGY

## 2022-12-11 PROCEDURE — 99223 PR INITIAL HOSPITAL CARE,LEVL III: ICD-10-PCS | Mod: 57,,, | Performed by: SURGERY

## 2022-12-11 PROCEDURE — 88365 INSITU HYBRIDIZATION (FISH): CPT | Mod: 26,,, | Performed by: PATHOLOGY

## 2022-12-11 PROCEDURE — C1758 CATHETER, URETERAL: HCPCS | Performed by: SURGERY

## 2022-12-11 PROCEDURE — 88341 IMHCHEM/IMCYTCHM EA ADD ANTB: CPT | Mod: 26,,, | Performed by: PATHOLOGY

## 2022-12-11 PROCEDURE — 25500020 PHARM REV CODE 255: Performed by: UROLOGY

## 2022-12-11 PROCEDURE — C2617 STENT, NON-COR, TEM W/O DEL: HCPCS | Performed by: SURGERY

## 2022-12-11 PROCEDURE — 88305 TISSUE EXAM BY PATHOLOGIST: ICD-10-PCS | Mod: 26,,, | Performed by: PATHOLOGY

## 2022-12-11 PROCEDURE — 25000003 PHARM REV CODE 250: Performed by: SURGERY

## 2022-12-11 PROCEDURE — 88365 PR  TISSUE HYBRIDIZATION: ICD-10-PCS | Mod: 26,,, | Performed by: PATHOLOGY

## 2022-12-11 RX ORDER — ATORVASTATIN CALCIUM 10 MG/1
20 TABLET, FILM COATED ORAL DAILY
Status: DISCONTINUED | OUTPATIENT
Start: 2022-12-11 | End: 2022-12-19 | Stop reason: HOSPADM

## 2022-12-11 RX ORDER — PROPOFOL 10 MG/ML
VIAL (ML) INTRAVENOUS
Status: DISCONTINUED | OUTPATIENT
Start: 2022-12-11 | End: 2022-12-11

## 2022-12-11 RX ORDER — ALBUTEROL SULFATE 0.83 MG/ML
2.5 SOLUTION RESPIRATORY (INHALATION) EVERY 4 HOURS PRN
Status: DISCONTINUED | OUTPATIENT
Start: 2022-12-11 | End: 2022-12-11 | Stop reason: HOSPADM

## 2022-12-11 RX ORDER — MUPIROCIN 20 MG/G
OINTMENT TOPICAL 2 TIMES DAILY
Status: DISPENSED | OUTPATIENT
Start: 2022-12-12 | End: 2022-12-17

## 2022-12-11 RX ORDER — HYDROCHLOROTHIAZIDE 12.5 MG/1
12.5 TABLET ORAL DAILY
Status: DISCONTINUED | OUTPATIENT
Start: 2022-12-11 | End: 2022-12-19

## 2022-12-11 RX ORDER — LOSARTAN POTASSIUM 50 MG/1
100 TABLET ORAL DAILY
Status: DISCONTINUED | OUTPATIENT
Start: 2022-12-11 | End: 2022-12-19

## 2022-12-11 RX ORDER — DIPHENHYDRAMINE HYDROCHLORIDE 50 MG/ML
25 INJECTION INTRAMUSCULAR; INTRAVENOUS EVERY 6 HOURS PRN
Status: DISCONTINUED | OUTPATIENT
Start: 2022-12-11 | End: 2022-12-11 | Stop reason: HOSPADM

## 2022-12-11 RX ORDER — ONDANSETRON 2 MG/ML
4 INJECTION INTRAMUSCULAR; INTRAVENOUS ONCE AS NEEDED
Status: DISCONTINUED | OUTPATIENT
Start: 2022-12-11 | End: 2022-12-11 | Stop reason: HOSPADM

## 2022-12-11 RX ORDER — SUCCINYLCHOLINE CHLORIDE 20 MG/ML
INJECTION INTRAMUSCULAR; INTRAVENOUS
Status: DISCONTINUED | OUTPATIENT
Start: 2022-12-11 | End: 2022-12-11

## 2022-12-11 RX ORDER — DIPHENHYDRAMINE HCL 25 MG
25 CAPSULE ORAL EVERY 6 HOURS PRN
Status: DISCONTINUED | OUTPATIENT
Start: 2022-12-11 | End: 2022-12-19 | Stop reason: HOSPADM

## 2022-12-11 RX ORDER — ONDANSETRON 2 MG/ML
INJECTION INTRAMUSCULAR; INTRAVENOUS
Status: DISCONTINUED | OUTPATIENT
Start: 2022-12-11 | End: 2022-12-11

## 2022-12-11 RX ORDER — GLUCAGON 1 MG
1 KIT INJECTION
Status: DISCONTINUED | OUTPATIENT
Start: 2022-12-11 | End: 2022-12-19 | Stop reason: HOSPADM

## 2022-12-11 RX ORDER — HYDROCODONE BITARTRATE AND ACETAMINOPHEN 5; 325 MG/1; MG/1
1 TABLET ORAL
Status: DISCONTINUED | OUTPATIENT
Start: 2022-12-11 | End: 2022-12-11 | Stop reason: HOSPADM

## 2022-12-11 RX ORDER — AMLODIPINE BESYLATE 5 MG/1
5 TABLET ORAL DAILY
Status: DISCONTINUED | OUTPATIENT
Start: 2022-12-11 | End: 2022-12-19

## 2022-12-11 RX ORDER — INSULIN ASPART 100 [IU]/ML
1-10 INJECTION, SOLUTION INTRAVENOUS; SUBCUTANEOUS EVERY 6 HOURS PRN
Status: DISCONTINUED | OUTPATIENT
Start: 2022-12-11 | End: 2022-12-19 | Stop reason: HOSPADM

## 2022-12-11 RX ORDER — EPHEDRINE SULFATE 50 MG/ML
INJECTION, SOLUTION INTRAVENOUS
Status: DISCONTINUED | OUTPATIENT
Start: 2022-12-11 | End: 2022-12-11

## 2022-12-11 RX ORDER — HYDROMORPHONE HCL IN 0.9% NACL 6 MG/30 ML
PATIENT CONTROLLED ANALGESIA SYRINGE INTRAVENOUS CONTINUOUS
Status: DISCONTINUED | OUTPATIENT
Start: 2022-12-11 | End: 2022-12-11

## 2022-12-11 RX ORDER — PANTOPRAZOLE SODIUM 40 MG/1
40 TABLET, DELAYED RELEASE ORAL DAILY
Status: DISCONTINUED | OUTPATIENT
Start: 2022-12-11 | End: 2022-12-19 | Stop reason: HOSPADM

## 2022-12-11 RX ORDER — ROCURONIUM BROMIDE 10 MG/ML
INJECTION, SOLUTION INTRAVENOUS
Status: DISCONTINUED | OUTPATIENT
Start: 2022-12-11 | End: 2022-12-11

## 2022-12-11 RX ORDER — FENTANYL CITRATE 50 UG/ML
25 INJECTION, SOLUTION INTRAMUSCULAR; INTRAVENOUS EVERY 5 MIN PRN
Status: DISCONTINUED | OUTPATIENT
Start: 2022-12-11 | End: 2022-12-11 | Stop reason: HOSPADM

## 2022-12-11 RX ORDER — FENTANYL CITRATE 50 UG/ML
INJECTION, SOLUTION INTRAMUSCULAR; INTRAVENOUS
Status: DISCONTINUED | OUTPATIENT
Start: 2022-12-11 | End: 2022-12-11

## 2022-12-11 RX ORDER — DEXAMETHASONE SODIUM PHOSPHATE 4 MG/ML
INJECTION, SOLUTION INTRA-ARTICULAR; INTRALESIONAL; INTRAMUSCULAR; INTRAVENOUS; SOFT TISSUE
Status: DISCONTINUED | OUTPATIENT
Start: 2022-12-11 | End: 2022-12-11

## 2022-12-11 RX ORDER — LIDOCAINE HYDROCHLORIDE 20 MG/ML
INJECTION, SOLUTION EPIDURAL; INFILTRATION; INTRACAUDAL; PERINEURAL
Status: DISCONTINUED | OUTPATIENT
Start: 2022-12-11 | End: 2022-12-11

## 2022-12-11 RX ORDER — NALOXONE HCL 0.4 MG/ML
0.02 VIAL (ML) INJECTION
Status: DISCONTINUED | OUTPATIENT
Start: 2022-12-11 | End: 2022-12-19 | Stop reason: HOSPADM

## 2022-12-11 RX ORDER — BUPIVACAINE HYDROCHLORIDE 2.5 MG/ML
INJECTION, SOLUTION EPIDURAL; INFILTRATION; INTRACAUDAL
Status: DISCONTINUED | OUTPATIENT
Start: 2022-12-11 | End: 2022-12-11 | Stop reason: HOSPADM

## 2022-12-11 RX ADMIN — SODIUM CHLORIDE, POTASSIUM CHLORIDE, SODIUM LACTATE AND CALCIUM CHLORIDE: 600; 310; 30; 20 INJECTION, SOLUTION INTRAVENOUS at 04:12

## 2022-12-11 RX ADMIN — EPHEDRINE SULFATE 25 MG: 50 INJECTION INTRAVENOUS at 02:12

## 2022-12-11 RX ADMIN — LOSARTAN POTASSIUM 100 MG: 50 TABLET, FILM COATED ORAL at 09:12

## 2022-12-11 RX ADMIN — FENTANYL CITRATE 25 MCG: 50 INJECTION INTRAMUSCULAR; INTRAVENOUS at 02:12

## 2022-12-11 RX ADMIN — SUCCINYLCHOLINE CHLORIDE 120 MG: 20 INJECTION, SOLUTION INTRAMUSCULAR; INTRAVENOUS at 12:12

## 2022-12-11 RX ADMIN — EPHEDRINE SULFATE 25 MG: 50 INJECTION INTRAVENOUS at 01:12

## 2022-12-11 RX ADMIN — PANTOPRAZOLE SODIUM 40 MG: 40 TABLET, DELAYED RELEASE ORAL at 09:12

## 2022-12-11 RX ADMIN — PROPOFOL 200 MG: 10 INJECTION, EMULSION INTRAVENOUS at 12:12

## 2022-12-11 RX ADMIN — AMLODIPINE BESYLATE 5 MG: 5 TABLET ORAL at 09:12

## 2022-12-11 RX ADMIN — HYDROCHLOROTHIAZIDE 12.5 MG: 12.5 TABLET ORAL at 09:12

## 2022-12-11 RX ADMIN — FENTANYL CITRATE 100 MCG: 50 INJECTION, SOLUTION INTRAMUSCULAR; INTRAVENOUS at 12:12

## 2022-12-11 RX ADMIN — DIPHENHYDRAMINE HYDROCHLORIDE 25 MG: 25 CAPSULE ORAL at 08:12

## 2022-12-11 RX ADMIN — ENOXAPARIN SODIUM 40 MG: 40 INJECTION SUBCUTANEOUS at 05:12

## 2022-12-11 RX ADMIN — HYDROMORPHONE HYDROCHLORIDE: 2 INJECTION INTRAMUSCULAR; INTRAVENOUS; SUBCUTANEOUS at 04:12

## 2022-12-11 RX ADMIN — ONDANSETRON 4 MG: 2 INJECTION, SOLUTION INTRAMUSCULAR; INTRAVENOUS at 02:12

## 2022-12-11 RX ADMIN — LIDOCAINE HYDROCHLORIDE 50 MG: 20 INJECTION, SOLUTION EPIDURAL; INFILTRATION; INTRACAUDAL; PERINEURAL at 12:12

## 2022-12-11 RX ADMIN — ROCURONIUM BROMIDE 100 MG: 10 INJECTION, SOLUTION INTRAVENOUS at 12:12

## 2022-12-11 RX ADMIN — ATORVASTATIN CALCIUM 20 MG: 10 TABLET, FILM COATED ORAL at 09:12

## 2022-12-11 RX ADMIN — DEXAMETHASONE SODIUM PHOSPHATE 4 MG: 4 INJECTION, SOLUTION INTRAMUSCULAR; INTRAVENOUS at 02:12

## 2022-12-11 RX ADMIN — CEFAZOLIN 2 G: 1 INJECTION, POWDER, FOR SOLUTION INTRAMUSCULAR; INTRAVENOUS at 12:12

## 2022-12-11 RX ADMIN — SODIUM CHLORIDE, SODIUM LACTATE, POTASSIUM CHLORIDE, AND CALCIUM CHLORIDE: .6; .31; .03; .02 INJECTION, SOLUTION INTRAVENOUS at 12:12

## 2022-12-11 NOTE — CONSULTS
[facility]  General Surgery  Consult Note    Patient Name: Becky Steen  MRN: [id number]  Code Status: Full Code  Admission Date: 12/10/2022  Hospital Length of Stay: 1 days  Attending Physician: Nehemiah Dixon MD  Primary Care Provider: SANDEEP Perdue    Patient information was obtained from patient.     Inpatient consult to General Surgery  Consult performed by: Gustabo Davis MD  Consult ordered by: Armond Yeager MD      Subjective:     Principal Problem: intraabdominal mass/bowel obstruction     History of Present Illness: 56 y/o female referred for right lower quadrant abdominal mass causing bowel obstruction. Patient reports constipation and decreased appetite. Denies any weight loss. Recently began having abdominal pain and bloating. CT in ER showing intraabdominal mass in right lower quadrant causing bowel obstruction.  Only previous abdominal/pelvic surgery was a tubal ligation.      No new subjective & objective note has been filed under this hospital service since the last note was generated.  CEA <1.7   31  Assessment/Plan:     Class 2 severe obesity due to excess calories with serious comorbidity and body mass index (BMI) of 36.0 to 36.9 in adult  Dietary modifications    Type 2 diabetes mellitus, with long-term current use of insulin  Medical mgmt    Primary hypertension  Medical mgmt    Bowel obstruction  Appears secondary to intra-abdominal mass.  Unclear etiology differential diagnosis discussed with patient.    NG tube to low intermittent wall suction   NPO, IV fluids    Exploratory laparotomy for further evaluation and treatment.  We discussed possibility of resection versus bypass depending on resectability at the time of surgery.  She may also need a diverting ostomy.  Will have ureteral stents placed  Risks and benefits discussed with patient including but not limited to: pain, bleeding, infection, scarring, injury to underlying organs/structures, leak, stricture, need for  further treatment      VTE Risk Mitigation (From admission, onward)           Ordered     enoxaparin injection 40 mg  Daily         12/10/22 2225     IP VTE HIGH RISK PATIENT  Once         12/10/22 2225     Place sequential compression device  Until discontinued         12/10/22 2225                    Thank you for your consult. I will follow-up with patient. Please contact us if you have any additional questions.     60 minutes of total time spent on the encounter, which includes face to face time and non-face to face time preparing to see the patient (eg, review of tests), Obtaining and/or reviewing separately obtained history, Documenting clinical information in the electronic or other health record, Independently interpreting results (not separately reported) and communicating results to the patient/family/caregiver, or Care coordination (not separately reported).     Gustabo Davis MD  General Surgery  [facility]

## 2022-12-11 NOTE — HPI
58 y/o female referred for right lower quadrant abdominal mass causing bowel obstruction. Patient reports constipation and decreased appetite. Denies any weight loss. Recently began having abdominal pain and bloating. CT in ER showing intraabdominal mass in right lower quadrant causing bowel obstruction.  Only previous abdominal/pelvic surgery was a tubal ligation.

## 2022-12-11 NOTE — OP NOTE
Date of Procedure: 12/11/2022    Pre-operative Diagnosis:   1.  Bowel obstruction    Post-operative Diagnosis:   1.  same    Surgeon: Brittany Gleason MD    Assistants: None    Specimen: none    Prosthetics, Devices, Grafts: None    Anesthesia: General    Procedures:  1. Cysto with placement of right ureteral stent  2. right retrograde pyelogram  3. Fluoro less than one hour    Procedure in Detail:  After proper consents were obtained, the patient was prepped and draped in normal sterile fashion in the dorsal lithotomy position.  The 22 Fr cystoscope was assembled and introduced per urethra.  The bladder was inspected. A 5-Urdu open-ended catheter was advanced into the right ureteral orifice and gentle retrograde pyelogram was performed, revealing a delicate ureter and renal collecting system without hydronephrosis. I advanced a glidewire through the pollock catheter into the renal pelvis under fluoroscopic guidance.  The pollock was backloaded.  A 7Fr x 26cm stent was then passed over the wire and when the wire was withdrawn fluoroscopy confirmed good placement with a curl in the stent on both ends.  The cystoscope was then reinserted to the bladder which was drained and the scope was then withdrawn and set aside. A garcias catheter was placed and the suture attached to the stent was taped to the garcias for easy removal following her surgery.    Blood Loss: none    Fluids: Per anesthesia.    Drains: 7x26cm right ureteral stent    Complications: None.

## 2022-12-11 NOTE — ASSESSMENT & PLAN NOTE
Patient's FSGs are controlled on current medication regimen.  Last A1c reviewed-   Lab Results   Component Value Date    HGBA1C 5.9 (H) 12/11/2022     Most recent fingerstick glucose reviewed-   Recent Labs   Lab 12/11/22  0556   POCTGLUCOSE 87     Current correctional scale  low  titrate anti-hyperglycemic dose as follows-   Antihyperglycemics (From admission, onward)    Start     Stop Route Frequency Ordered    12/11/22 0321  insulin aspart U-100 pen 1-10 Units         -- SubQ Every 6 hours PRN 12/11/22 0222      Plan:  -SSI  -Continue home insulin at decreased dose and titrate as needed  -Hold oral hypoglycemics while patient is in the hospital  -Hypoglycemic protocol

## 2022-12-11 NOTE — ASSESSMENT & PLAN NOTE
Currently normotensive.  Plan:  -Optimize pain control   -Continue home medications, titrate as needed   -Monitor BP  -Low salt/cardiac diet when not NPO  -IV hydralazine prn for SBP>160 or DBP>90       Continue current regimen and adjust accordingly

## 2022-12-11 NOTE — PLAN OF CARE
Patient remains injury free.  No signs or symptoms of distress noted.  Patient complaining of abdominal pain.  Pain is being managed with prescribed medication.  NG to left nare remains in place with IMWS.  Pain pump will be initiated according to orders.  All active orders has been reviewed and followed.  12 hour chart check completed.

## 2022-12-11 NOTE — SUBJECTIVE & OBJECTIVE
Past Medical History:   Diagnosis Date    Asthma     Diabetes mellitus     GERD (gastroesophageal reflux disease)     HLD (hyperlipidemia)     Hypertension        Past Surgical History:   Procedure Laterality Date     SECTION         Review of patient's allergies indicates:  No Known Allergies    No current facility-administered medications on file prior to encounter.     Current Outpatient Medications on File Prior to Encounter   Medication Sig    amLODIPine (NORVASC) 5 MG tablet Take 5 mg by mouth once daily.    atorvastatin (LIPITOR) 20 MG tablet Take 20 mg by mouth.    budesonide-formoterol 160-4.5 mcg (SYMBICORT) 160-4.5 mcg/actuation HFAA Symbicort 160 mcg-4.5 mcg/actuation HFA aerosol inhaler    celecoxib (CELEBREX) 200 MG capsule celecoxib 200 mg capsule   TAKE 1 CAPSULE BY MOUTH TWICE A DAY    ergocalciferol (ERGOCALCIFEROL) 50,000 unit Cap ergocalciferol (vitamin D2) 1,250 mcg (50,000 unit) capsule   Take 1 capsule every week by oral route.    fluticasone propionate (FLONASE) 50 mcg/actuation nasal spray SMARTSI Spray(s) Both Nares Every Morning    HYDROcodone-acetaminophen (NORCO) 7.5-325 mg per tablet hydrocodone 7.5 mg-acetaminophen 325 mg tablet    LEVEMIR FLEXTOUCH U-100 INSULN 100 unit/mL (3 mL) InPn pen SMARTSI Unit(s) SUB-Q Daily    loratadine (CLARITIN) 10 mg tablet loratadine 10 mg tablet   Take 1 tablet every day by oral route.    losartan-hydrochlorothiazide 100-12.5 mg (HYZAAR) 100-12.5 mg Tab Take 1 tablet by mouth once daily.    metFORMIN (GLUCOPHAGE-XR) 500 MG ER 24hr tablet metformin  mg tablet,extended release 24 hr   TAKE 1 TABLET BY MOUTH EVERY DAY    methotrexate (XATMEP ORAL) methotrexate    montelukast (SINGULAIR) 10 mg tablet montelukast 10 mg tablet    omeprazole (PRILOSEC) 40 MG capsule omeprazole 40 mg capsule,delayed release    albuterol (PROVENTIL/VENTOLIN HFA) 90 mcg/actuation inhaler Inhale 2 puffs into the lungs every 6 (six) hours as needed for  Wheezing.    [DISCONTINUED] methotrexate 2.5 MG Tab Take by mouth every 7 days.     Family History       Problem Relation (Age of Onset)    No Known Problems Mother, Father          Tobacco Use    Smoking status: Never    Smokeless tobacco: Never   Substance and Sexual Activity    Alcohol use: Not Currently    Drug use: Never    Sexual activity: Not Currently     Review of Systems   All other systems reviewed and are negative.  Objective:     Vital Signs (Most Recent):  Temp: 97.9 °F (36.6 °C) (12/10/22 2136)  Pulse: 100 (12/10/22 2136)  Resp: 17 (12/10/22 2136)  BP: 133/81 (12/10/22 2136)  SpO2: 99 % (12/10/22 2136) Vital Signs (24h Range):  Temp:  [97.9 °F (36.6 °C)-98.4 °F (36.9 °C)] 97.9 °F (36.6 °C)  Pulse:  [] 100  Resp:  [16-20] 17  SpO2:  [92 %-99 %] 99 %  BP: (100-136)/(62-88) 133/81     Weight: 100.8 kg (222 lb 1.8 oz)  Body mass index is 36.96 kg/m².    Physical Exam  Vitals reviewed.   Constitutional:       General: She is not in acute distress.     Appearance: Normal appearance. She is obese. She is not ill-appearing, toxic-appearing or diaphoretic.   HENT:      Head: Normocephalic and atraumatic.      Right Ear: External ear normal.      Left Ear: External ear normal.      Nose: Nose normal. No congestion or rhinorrhea.      Mouth/Throat:      Mouth: Mucous membranes are moist.      Pharynx: Oropharynx is clear. No oropharyngeal exudate or posterior oropharyngeal erythema.   Eyes:      General: No scleral icterus.     Extraocular Movements: Extraocular movements intact.      Conjunctiva/sclera: Conjunctivae normal.      Pupils: Pupils are equal, round, and reactive to light.   Neck:      Vascular: No carotid bruit.   Cardiovascular:      Rate and Rhythm: Normal rate and regular rhythm.      Pulses: Normal pulses.      Heart sounds: Normal heart sounds. No murmur heard.    No friction rub. No gallop.   Pulmonary:      Effort: Pulmonary effort is normal. No respiratory distress.      Breath sounds:  Normal breath sounds. No stridor. No wheezing, rhonchi or rales.   Chest:      Chest wall: No tenderness.   Abdominal:      General: There is no distension.      Palpations: Abdomen is soft.      Tenderness: There is abdominal tenderness. There is no right CVA tenderness, left CVA tenderness, guarding or rebound.      Hernia: No hernia is present.      Comments: Obese abdomen. Decreased bowel sounds noted throughout. TTP throughout without evidence of guarding or rebound tenderness noted. NGT in placed hooked to suction.    Musculoskeletal:         General: No swelling, tenderness, deformity or signs of injury. Normal range of motion.      Cervical back: Normal range of motion and neck supple. No rigidity or tenderness.      Right lower leg: No edema.      Left lower leg: No edema.   Lymphadenopathy:      Cervical: No cervical adenopathy.   Skin:     General: Skin is warm and dry.      Capillary Refill: Capillary refill takes less than 2 seconds.      Coloration: Skin is not jaundiced or pale.      Findings: No bruising, erythema, lesion or rash.   Neurological:      General: No focal deficit present.      Mental Status: She is alert and oriented to person, place, and time. Mental status is at baseline.      Cranial Nerves: No cranial nerve deficit.      Sensory: No sensory deficit.      Motor: No weakness.      Coordination: Coordination normal.   Psychiatric:         Mood and Affect: Mood normal.         Behavior: Behavior normal.         Thought Content: Thought content normal.         Judgment: Judgment normal.         CRANIAL NERVES     CN III, IV, VI   Pupils are equal, round, and reactive to light.     Significant Labs: All pertinent labs within the past 24 hours have been reviewed.    Significant Imaging: I have reviewed all pertinent imaging results/findings within the past 24 hours.    LABS:  Recent Results (from the past 24 hour(s))   Urinalysis, Reflex to Urine Culture Urine, Clean Catch    Collection  Time: 12/10/22 12:00 PM    Specimen: Urine   Result Value Ref Range    Specimen UA Urine, Clean Catch     Color, UA Yellow Yellow, Straw, Yadira    Appearance, UA Clear Clear    pH, UA 6.0 5.0 - 8.0    Specific Gravity, UA 1.020 1.005 - 1.030    Protein, UA Trace (A) Negative    Glucose, UA Negative Negative    Ketones, UA Negative Negative    Bilirubin (UA) Negative Negative    Occult Blood UA Negative Negative    Nitrite, UA Negative Negative    Urobilinogen, UA Negative <2.0 EU/dL    Leukocytes, UA Trace (A) Negative   Urinalysis Microscopic    Collection Time: 12/10/22 12:00 PM   Result Value Ref Range    RBC, UA 3 0 - 4 /hpf    WBC, UA 6 (H) 0 - 5 /hpf    Bacteria Few (A) None-Occ /hpf    Squam Epithel, UA 10 /hpf    Microscopic Comment SEE COMMENT    CBC W/ AUTO DIFFERENTIAL    Collection Time: 12/10/22 12:09 PM   Result Value Ref Range    WBC 9.80 3.90 - 12.70 K/uL    RBC 4.73 4.00 - 5.40 M/uL    Hemoglobin 13.6 12.0 - 16.0 g/dL    Hematocrit 41.8 37.0 - 48.5 %    MCV 88 82 - 98 fL    MCH 28.8 27.0 - 31.0 pg    MCHC 32.5 32.0 - 36.0 g/dL    RDW 14.0 11.5 - 14.5 %    Platelets 377 150 - 450 K/uL    MPV 10.7 9.2 - 12.9 fL    Immature Granulocytes 0.2 0.0 - 0.5 %    Gran # (ANC) 7.6 1.8 - 7.7 K/uL    Immature Grans (Abs) 0.02 0.00 - 0.04 K/uL    Lymph # 1.2 1.0 - 4.8 K/uL    Mono # 0.9 0.3 - 1.0 K/uL    Eos # 0.1 0.0 - 0.5 K/uL    Baso # 0.03 0.00 - 0.20 K/uL    nRBC 0 0 /100 WBC    Gran % 77.7 (H) 38.0 - 73.0 %    Lymph % 11.9 (L) 18.0 - 48.0 %    Mono % 9.0 4.0 - 15.0 %    Eosinophil % 0.9 0.0 - 8.0 %    Basophil % 0.3 0.0 - 1.9 %    Differential Method Automated    Comp. Metabolic Panel    Collection Time: 12/10/22 12:09 PM   Result Value Ref Range    Sodium 137 136 - 145 mmol/L    Potassium 3.8 3.5 - 5.1 mmol/L    Chloride 102 95 - 110 mmol/L    CO2 21 (L) 23 - 29 mmol/L    Glucose 116 (H) 70 - 110 mg/dL    BUN 13 6 - 20 mg/dL    Creatinine 0.7 0.5 - 1.4 mg/dL    Calcium 10.2 8.7 - 10.5 mg/dL    Total Protein  7.9 6.0 - 8.4 g/dL    Albumin 4.2 3.5 - 5.2 g/dL    Total Bilirubin 1.7 (H) 0.1 - 1.0 mg/dL    Alkaline Phosphatase 74 55 - 135 U/L    AST 21 10 - 40 U/L    ALT 18 10 - 44 U/L    Anion Gap 14 8 - 16 mmol/L    eGFR >60.0 >60 mL/min/1.73 m^2   Lipase    Collection Time: 12/10/22 12:09 PM   Result Value Ref Range    Lipase 7 4 - 60 U/L   Troponin I    Collection Time: 12/10/22 12:09 PM   Result Value Ref Range    Troponin I <0.006 0.000 - 0.026 ng/mL   Brain natriuretic peptide    Collection Time: 12/10/22 12:09 PM   Result Value Ref Range    BNP <10 0 - 99 pg/mL       RADIOLOGY  X-Ray Chest 1 View    Result Date: 12/10/2022  EXAMINATION: XR CHEST 1 VIEW CLINICAL HISTORY: Presence of other specified devices TECHNIQUE: Single frontal view of the chest was performed. COMPARISON: None FINDINGS: Satisfactory nasogastric tube.The lungs are clear, with normal appearance of pulmonary vasculature and no pleural effusion or pneumothorax. The cardiac silhouette is normal in size. The hilar and mediastinal contours are unremarkable. Bones are intact.     Satisfactory nasogastric tube with tip in the stomach. Electronically signed by: Miguelito Wilkinson Date:    12/10/2022 Time:    18:19    CT Abdomen Pelvis With Contrast    Result Date: 12/10/2022  EXAMINATION: CT ABDOMEN PELVIS WITH CONTRAST CLINICAL HISTORY: Abdominal pain, acute, nonlocalized; TECHNIQUE: Axial CT imaging was performed through the abdomen and pelvis with  75cc  of intravenous contrast. Multiplanar reformats were performed and interpreted. COMPARISON: None FINDINGS: Clustered micronodular opacities in the right lower lobe, likely infectious. There is an 11.7 x 11.1 x 9.9 cm soft tissue mass located in the right lower quadrant that either arises from the cecum or a distal small bowel loops that results in high-grade mechanical small bowel obstruction.  Upstream moderate dilatation of multiple loops of small bowel with air-fluid levels.  There are scattered soft tissue  nodules throughout the right side of the mesentery and in the pelvis consistent with peritoneal carcinomatosis.  Trace perihepatic ascites.  No free air or abscess. The gallbladder is unremarkable. The abdominal aorta is normal in caliber. The urinary bladder is unremarkable. No significant osseous abnormality is identified.     11 cm soft tissue mass either arising from the cecum or distal small bowel consistent with malignancy.  The mass causes a high-grade distal mechanical small bowel obstruction.  Positive for peritoneal carcinomatosis. All CT scans at this facility are performed  using dose modulation techniques as appropriate to performed exam including the following:  automated exposure control; adjustment of mA and/or kV according to the patients size (this includes techniques or standardized protocols for targeted exams where dose is matched to indication/reason for exam: i.e. extremities or head);  iterative reconstruction technique. Electronically signed by: Yeison Aldridge MD Date:    12/10/2022 Time:    14:31      EKG    MICROBIOLOGY    MDM

## 2022-12-11 NOTE — PROGRESS NOTES
[facility] Medicine  Progress Note    Patient Name: Becky Steen  MRN: [id number]  Patient Class: IP- Inpatient   Admission Date: 12/10/2022  Length of Stay: 1 days  Attending Physician: Nehemiah Dixon MD  Primary Care Provider: SANDEEP Perdue        Subjective:     Principal Problem:Bowel obstruction        HPI:  Becky Steen is a 57 y.o. female with a PMH  has a past medical history of Asthma, Diabetes mellitus, GERD (gastroesophageal reflux disease), HLD (hyperlipidemia), and Hypertension. who presented as a transfer from [facility] for higher level of care after patient presented with complaints of generalized abdominal pain with associated nausea and vomiting and was found to have evidence of a soft tissue mass arising from the cecum/distal small bowel causing bowel obstruction noted on CT abdomen/pelvis. Patient reported acute onset and progressively worsening generalized abdominal pain which began approximately 2 weeks prior to admission. She reported pain is intermittent in frequency and described as a sharp/labored like pains throughout her entire abdomen, currently rated 0/10 in severity but worsens to 10/10 in severity with no known alleviating or aggravating factors noted.  Associated symptoms included nausea, vomiting, constipation, and decreased oral intake but denied endorsing any fever, chills, sweats, chest pain, shortness a breath, dysuria, hematuria, hematochezia, melena, or neurological deficits.  Patient reported having small bowel movement yesterday with intermittent bowel movements throughout the week reported loose/watery nature.  Patient reported last colonoscopy was within 10 years with [facility] and was told to come back 10 years later for repeat.  Patient follows Dr. Alcala at [facility] for OBGYN with last reported mg obtain last year and Pap smear few months ago prior to admission and reported both were normal.  Patient reports history of skin  cancer in her father but is unaware of any other known family history of cancers.  Prior to onset of symptoms, patient reported being in her usual state of health no other concerns or complaints.  All other review of systems negative except as noted above.  Patient admitted to Hospital Medicine for continued medical management of bowel obstruction and further cancer workup. General Surgery aware of patient and awaiting further evaluation in the morning.     PCP: Kerry Aguilar        Overview/Hospital Course:   admitted for bowel obstruction awaiting surgery. Denies prior abnormal cscope with polypectomy. Denies hysterectomy or h maternal gu cancers. Paternal  in 80s. Reports diarrheal episodes over last 2 weeks. Passing flatus.       Interval History: See hospital course for today      Review of Systems   Constitutional:  Negative for activity change, appetite change, fatigue and fever.   Respiratory:  Negative for shortness of breath.    Cardiovascular:  Negative for chest pain.   Gastrointestinal:  Positive for abdominal pain, diarrhea and nausea.   Neurological:  Negative for weakness.   Psychiatric/Behavioral:  Negative for agitation, behavioral problems, confusion, decreased concentration and dysphoric mood. The patient is nervous/anxious.    Objective:     Vital Signs (Most Recent):  Temp: 97.3 °F (36.3 °C) (22)  Pulse: 79 (22)  Resp: 18 (22)  BP: 126/76 (22)  SpO2: 95 % (22) Vital Signs (24h Range):  Temp:  [97.3 °F (36.3 °C)-98.4 °F (36.9 °C)] 97.3 °F (36.3 °C)  Pulse:  [] 79  Resp:  [16-20] 18  SpO2:  [92 %-99 %] 95 %  BP: (100-136)/(62-88) 126/76     Weight: 100.6 kg (221 lb 12.5 oz)  Body mass index is 36.91 kg/m².    Intake/Output Summary (Last 24 hours) at 2022 1028  Last data filed at 2022 0345  Gross per 24 hour   Intake 1956.85 ml   Output --   Net 1956.85 ml      Physical Exam  Vitals and nursing note reviewed.    Constitutional:       General: She is not in acute distress.     Appearance: She is obese. She is ill-appearing. She is not toxic-appearing.   HENT:      Head: Normocephalic and atraumatic.     Cardiovascular:      Rate and Rhythm: Normal rate.   Pulmonary:      Effort: Pulmonary effort is normal. No respiratory distress.   Abdominal:      General: Bowel sounds are normal.      Palpations: Abdomen is soft.      Tenderness: There is no abdominal tenderness.   Musculoskeletal:      Right lower leg: No edema.      Left lower leg: No edema.   Skin:     General: Skin is warm.   Neurological:      Mental Status: She is alert and oriented to person, place, and time.   Psychiatric:         Mood and Affect: Mood is anxious.         Speech: Speech normal.         Behavior: Behavior normal. Behavior is cooperative.       Significant Labs: All pertinent labs within the past 24 hours have been reviewed.  CBC:   Recent Labs   Lab 12/10/22  1209 12/11/22  0544   WBC 9.80 7.53   HGB 13.6 12.0   HCT 41.8 37.7    316     CMP:   Recent Labs   Lab 12/10/22  1209 12/11/22  0544    139   K 3.8 4.1    104   CO2 21* 26   * 95   BUN 13 12   CREATININE 0.7 0.7   CALCIUM 10.2 8.9   PROT 7.9 6.3   ALBUMIN 4.2 3.4*   BILITOT 1.7* 1.6*   ALKPHOS 74 65   AST 21 17   ALT 18 12   ANIONGAP 14 9       Significant Imaging: I have reviewed all pertinent imaging results/findings within the past 24 hours.  CT: I have reviewed all pertinent results/findings within the past 24 hours and my personal findings are:  abdomen-soft tissue mass 11cm, peritoneal carinomatosis  Kub-satisfactory placement of ngt      Assessment/Plan:      * Bowel obstruction  Pending surgical intervention      Right lower quadrant abdominal mass  Awaiting surgery for possible resection  Npo  ngt     Nausea and vomiting  ngt   Surgery following      Class 2 severe obesity due to excess calories with serious comorbidity and body mass index (BMI) of 36.0 to  36.9 in adult  Body mass index is 36.96 kg/m². Elevation likely secondary to increased calorie intake and sedentary lifestyle. Patient educated on morbidity and mortality in regards to elevated BMI and stressed importance of diet and exercise.   Plan:  -low fat/low calorie diet       Asthma  Patient with history of Asthma currently on inhalers but not home oxygen. Not presently in acute exacerbation and is without signs/symptoms of distress. Patient currently saturating  99% on 2 L/min. CXR revealed satisfactory nasogastric tube with clear lungs and normal appearance of pulmonary vasculature without evidence of pleural effusion or pneumothorax.   Plan:  -Continue home medications, titrate as needed  -Titrate oxygen requirements as needed  -Incentive spirometry   -Monitor pulse oximetry  -Duonebs prn          Type 2 diabetes mellitus, with long-term current use of insulin  Patient's FSGs are controlled on current medication regimen.  Last A1c reviewed-   Lab Results   Component Value Date    HGBA1C 5.9 (H) 12/11/2022     Most recent fingerstick glucose reviewed-   Recent Labs   Lab 12/11/22  0556   POCTGLUCOSE 87     Current correctional scale  low  titrate anti-hyperglycemic dose as follows-   Antihyperglycemics (From admission, onward)    Start     Stop Route Frequency Ordered    12/11/22 0321  insulin aspart U-100 pen 1-10 Units         -- SubQ Every 6 hours PRN 12/11/22 0222      Plan:  -SSI  -Continue home insulin at decreased dose and titrate as needed  -Hold oral hypoglycemics while patient is in the hospital  -Hypoglycemic protocol     Gastroesophageal reflux disease without esophagitis  Chronic. Stable. Currently asymptomatic. Home medications include PPI/Antacids as needed.  Plan:  -Continue PPI/Antacids as needed         Primary hypertension  Currently normotensive.  Plan:  -Optimize pain control   -Continue home medications, titrate as needed   -Monitor BP  -Low salt/cardiac diet when not NPO  -IV  hydralazine prn for SBP>160 or DBP>90       Continue current regimen and adjust accordingly        Generalized abdominal pain  Patient presented with worsening abdominal pain with associated nausea, vomiting, decreased p.o. intake, and constipation x2 weeks' duration and was found to have evidence of bowel obstruction associated with soft tissue mass.  CT abdomen/pelvis revealed an 11 cm soft tissue mass either arising from the cecum or distal small bowel consistent with malignancy causing a high-grade distal mechanical small bowel obstruction in addition to presence of peritoneal carcinomatosis.  Patient remains afebrile without leukocytosis and remains hemodynamically stable.  CMP, CBC, LFTs, and lipase within normal limits.  UA negative for UTI.  Patient status post NG tube insertion and currently on suction.  General surgery consulted and awaiting further evaluation/recommendations.  CEA and  ordered and pending prior to transfer.  Oncology consulted for malignant workup and to establish care.    Plan:  -NPO  -Continue current pain regimen, titrate as needed  -Bowel regimen  -Bedrest  -Continue NGT decompression  -IVFs prn  -f/u CEA and   -Antiemetics prn  -Tylenol as needed for fever   -f/u general surgery and oncology  -PT/OT     12/11  cea within normal limits  ca125 mildly elevated  Awaiting surgery today  Continue ngt          VTE Risk Mitigation (From admission, onward)         Ordered     enoxaparin injection 40 mg  Daily         12/10/22 2225     IP VTE HIGH RISK PATIENT  Once         12/10/22 2225     Place sequential compression device  Until discontinued         12/10/22 2225                Discharge Planning   FABIANA:      Code Status: Full Code   Is the patient medically ready for discharge?:     Reason for patient still in hospital (select all that apply): Patient trending condition, Laboratory test, Treatment, Imaging, Consult recommendations and Pending disposition                      Nehemiah Dixon MD  Department of Hospital Medicine   [facility]

## 2022-12-11 NOTE — ANESTHESIA PREPROCEDURE EVALUATION
2022  Becky Steen is a 57 y.o., female.  Past Medical History:   Diagnosis Date    Asthma     Diabetes mellitus     GERD (gastroesophageal reflux disease)     HLD (hyperlipidemia)     Hypertension     RA (rheumatoid arthritis)      Past Surgical History:   Procedure Laterality Date     SECTION           Pre-op Assessment    I have reviewed the Patient Summary Reports.     I have reviewed the Nursing Notes. I have reviewed the NPO Status.   I have reviewed the Medications.     Review of Systems  Anesthesia Hx:  No problems with previous Anesthesia  Denies Family Hx of Anesthesia complications.   Denies Personal Hx of Anesthesia complications.   Social:  Non-Smoker    Hematology/Oncology:  Hematology Normal        Cardiovascular:   Hypertension hyperlipidemia    Pulmonary:   Asthma moderate Chronic inhalers, not on O2.   Renal/:  Renal/ Normal     Hepatic/GI:   GERD SBO due to large mass in abdomen.   Neurological:  Neurology Normal    Endocrine:   Diabetes, type 2  Obesity / BMI > 30  Psych:  Psychiatric Normal           Physical Exam  General: Alert and Oriented    Airway:  Mallampati: II   Mouth Opening: Normal  TM Distance: Normal  Tongue: Normal  Neck ROM: Normal ROM    Dental:  Intact    Chest/Lungs:  Clear to auscultation, Normal Respiratory Rate    Heart:  Rate: Normal  Rhythm: Regular Rhythm    Abdomen:NGT      Anesthesia Plan  Type of Anesthesia, risks & benefits discussed:    Anesthesia Type: Gen ETT  Intra-op Monitoring Plan: Standard ASA Monitors  Post Op Pain Control Plan: multimodal analgesia and IV/PO Opioids PRN  Induction:  IV  Informed Consent: Informed consent signed with the Patient and all parties understand the risks and agree with anesthesia plan.  All questions answered.   ASA Score: 3 Emergent  Day of Surgery Review of History & Physical: H&P Update  referred to the surgeon/provider.    Ready For Surgery From Anesthesia Perspective.     .

## 2022-12-11 NOTE — ASSESSMENT & PLAN NOTE
Body mass index is 36.96 kg/m². Elevation likely secondary to increased calorie intake and sedentary lifestyle. Patient educated on morbidity and mortality in regards to elevated BMI and stressed importance of diet and exercise.   Plan:  -low fat/low calorie diet

## 2022-12-11 NOTE — ASSESSMENT & PLAN NOTE
Patient's FSGs are controlled on current medication regimen.  Last A1c reviewed- No results found for: LABA1C, HGBA1C  Most recent fingerstick glucose reviewed- No results for input(s): POCTGLUCOSE in the last 24 hours.  Current correctional scale  low  titrate anti-hyperglycemic dose as follows-   Antihyperglycemics (From admission, onward)    None      Plan:  -SSI  -Continue home insulin at decreased dose and titrate as needed  -Hold oral hypoglycemics while patient is in the hospital  -Hypoglycemic protocol

## 2022-12-11 NOTE — SUBJECTIVE & OBJECTIVE
No current facility-administered medications on file prior to encounter.     Current Outpatient Medications on File Prior to Encounter   Medication Sig    amLODIPine (NORVASC) 5 MG tablet Take 5 mg by mouth once daily.    atorvastatin (LIPITOR) 20 MG tablet Take 20 mg by mouth.    budesonide-formoterol 160-4.5 mcg (SYMBICORT) 160-4.5 mcg/actuation HFAA Symbicort 160 mcg-4.5 mcg/actuation HFA aerosol inhaler    celecoxib (CELEBREX) 200 MG capsule celecoxib 200 mg capsule   TAKE 1 CAPSULE BY MOUTH TWICE A DAY    ergocalciferol (ERGOCALCIFEROL) 50,000 unit Cap ergocalciferol (vitamin D2) 1,250 mcg (50,000 unit) capsule   Take 1 capsule every week by oral route.    fluticasone propionate (FLONASE) 50 mcg/actuation nasal spray SMARTSI Spray(s) Both Nares Every Morning    HYDROcodone-acetaminophen (NORCO) 7.5-325 mg per tablet hydrocodone 7.5 mg-acetaminophen 325 mg tablet    LEVEMIR FLEXTOUCH U-100 INSULN 100 unit/mL (3 mL) InPn pen SMARTSI Unit(s) SUB-Q Daily    loratadine (CLARITIN) 10 mg tablet loratadine 10 mg tablet   Take 1 tablet every day by oral route.    losartan-hydrochlorothiazide 100-12.5 mg (HYZAAR) 100-12.5 mg Tab Take 1 tablet by mouth once daily.    metFORMIN (GLUCOPHAGE-XR) 500 MG ER 24hr tablet metformin  mg tablet,extended release 24 hr   TAKE 1 TABLET BY MOUTH EVERY DAY    methotrexate (XATMEP ORAL) methotrexate    montelukast (SINGULAIR) 10 mg tablet montelukast 10 mg tablet    omeprazole (PRILOSEC) 40 MG capsule omeprazole 40 mg capsule,delayed release    albuterol (PROVENTIL/VENTOLIN HFA) 90 mcg/actuation inhaler Inhale 2 puffs into the lungs every 6 (six) hours as needed for Wheezing.       Review of patient's allergies indicates:  No Known Allergies    Past Medical History:   Diagnosis Date    Asthma     Diabetes mellitus     GERD (gastroesophageal reflux disease)     HLD (hyperlipidemia)     Hypertension     RA (rheumatoid arthritis)      Past Surgical History:   Procedure  Laterality Date     SECTION       Family History       Problem Relation (Age of Onset)    Cancer Father    No Known Problems Mother          Tobacco Use    Smoking status: Never    Smokeless tobacco: Never   Substance and Sexual Activity    Alcohol use: Not Currently    Drug use: Never    Sexual activity: Not Currently     Review of Systems   Constitutional:  Negative for chills, fatigue, fever and unexpected weight change.   Respiratory:  Negative for cough, shortness of breath, wheezing and stridor.    Cardiovascular:  Negative for chest pain, palpitations and leg swelling.   Gastrointestinal:  Negative for abdominal distention, abdominal pain, constipation, diarrhea, nausea and vomiting.   Genitourinary:  Negative for difficulty urinating, dysuria, frequency, hematuria and urgency.   Skin:  Negative for color change, pallor, rash and wound.   Hematological:  Does not bruise/bleed easily.   Objective:     Vital Signs (Most Recent):  Temp: 97.3 °F (36.3 °C) (22)  Pulse: 79 (22)  Resp: 18 (22)  BP: 126/76 (22)  SpO2: 95 % (22) Vital Signs (24h Range):  Temp:  [97.3 °F (36.3 °C)-98.4 °F (36.9 °C)] 97.3 °F (36.3 °C)  Pulse:  [] 79  Resp:  [16-20] 18  SpO2:  [92 %-99 %] 95 %  BP: (100-136)/(62-88) 126/76     Weight: 100.6 kg (221 lb 12.5 oz)  Body mass index is 36.91 kg/m².    Physical Exam  Vitals reviewed.   Constitutional:       Appearance: She is well-developed. She is obese.   HENT:      Head: Normocephalic and atraumatic.   Cardiovascular:      Rate and Rhythm: Normal rate and regular rhythm.   Pulmonary:      Effort: Pulmonary effort is normal.      Breath sounds: Normal breath sounds.   Abdominal:      General: Bowel sounds are normal. There is no distension.      Palpations: Abdomen is soft.      Tenderness: There is no abdominal tenderness.   Musculoskeletal:      Cervical back: Neck supple.   Skin:     General: Skin is warm and dry.    Neurological:      Mental Status: She is alert and oriented to person, place, and time.       Significant Labs:  I have reviewed all pertinent lab results within the past 24 hours.  CBC:   Recent Labs   Lab 12/11/22  0544   WBC 7.53   RBC 4.18   HGB 12.0   HCT 37.7      MCV 90   MCH 28.7   MCHC 31.8*     CMP:   Recent Labs   Lab 12/11/22  0544   GLU 95   CALCIUM 8.9   ALBUMIN 3.4*   PROT 6.3      K 4.1   CO2 26      BUN 12   CREATININE 0.7   ALKPHOS 65   ALT 12   AST 17   BILITOT 1.6*       Significant Diagnostics:  CT:  Impression:     11 cm soft tissue mass either arising from the cecum or distal small bowel consistent with malignancy.  The mass causes a high-grade distal mechanical small bowel obstruction.  Positive for peritoneal carcinomatosis.

## 2022-12-11 NOTE — ASSESSMENT & PLAN NOTE
Patient with history of Asthma currently on inhalers but not home oxygen. Not presently in acute exacerbation and is without signs/symptoms of distress. Patient currently saturating  99% on 2 L/min. CXR revealed satisfactory nasogastric tube with clear lungs and normal appearance of pulmonary vasculature without evidence of pleural effusion or pneumothorax.   Plan:  -Continue home medications, titrate as needed  -Titrate oxygen requirements as needed  -Incentive spirometry   -Monitor pulse oximetry  -Kingsley prn

## 2022-12-11 NOTE — SUBJECTIVE & OBJECTIVE
Oncology Treatment Plan:   [No matching plan found]    Medications:  Continuous Infusions:   lactated ringers 75 mL/hr at 22 0345     Scheduled Meds:   amLODIPine  5 mg Oral Daily    atorvastatin  20 mg Oral Daily    enoxaparin  40 mg Subcutaneous Daily    hydroCHLOROthiazide  12.5 mg Oral Daily    losartan  100 mg Oral Daily    pantoprazole  40 mg Oral Daily     PRN Meds:acetaminophen, acetaminophen, albuterol-ipratropium, aluminum-magnesium hydroxide-simethicone, dextrose 10%, dextrose 10%, dextrose 10%, dextrose 10%, glucagon (human recombinant), glucagon (human recombinant), glucose, glucose, HYDROcodone-acetaminophen, insulin aspart U-100, melatonin, morphine, naloxone, ondansetron, promethazine, sodium chloride 0.9%     Review of patient's allergies indicates:  No Known Allergies     Past Medical History:   Diagnosis Date    Asthma     Diabetes mellitus     GERD (gastroesophageal reflux disease)     HLD (hyperlipidemia)     Hypertension     RA (rheumatoid arthritis)      Past Surgical History:   Procedure Laterality Date     SECTION       Family History       Problem Relation (Age of Onset)    Cancer Father    No Known Problems Mother          Tobacco Use    Smoking status: Never    Smokeless tobacco: Never   Substance and Sexual Activity    Alcohol use: Not Currently    Drug use: Never    Sexual activity: Not Currently       Review of Systems   Constitutional:  Negative for activity change, appetite change, chills, diaphoresis, fatigue, fever and unexpected weight change.   HENT:  Negative for congestion, dental problem, drooling, ear discharge, ear pain, facial swelling, hearing loss, mouth sores, nosebleeds, postnasal drip, rhinorrhea, sinus pressure, sneezing, sore throat, tinnitus, trouble swallowing and voice change.    Eyes:  Negative for photophobia, pain, discharge, redness, itching and visual disturbance.   Respiratory:  Negative for cough, choking, chest tightness, shortness of breath,  wheezing and stridor.    Cardiovascular:  Negative for chest pain, palpitations and leg swelling.   Gastrointestinal:  Positive for abdominal distention, abdominal pain, nausea and vomiting. Negative for anal bleeding, blood in stool, constipation, diarrhea and rectal pain.   Endocrine: Negative for cold intolerance, heat intolerance, polydipsia, polyphagia and polyuria.   Genitourinary:  Negative for decreased urine volume, difficulty urinating, dyspareunia, dysuria, enuresis, flank pain, frequency, genital sores, hematuria, menstrual problem, pelvic pain, urgency, vaginal bleeding, vaginal discharge and vaginal pain.   Musculoskeletal:  Negative for arthralgias, back pain, gait problem, joint swelling, myalgias, neck pain and neck stiffness.   Skin:  Negative for color change, pallor and rash.   Allergic/Immunologic: Negative for environmental allergies, food allergies and immunocompromised state.   Neurological:  Negative for dizziness, tremors, seizures, syncope, facial asymmetry, speech difficulty, weakness, light-headedness, numbness and headaches.   Hematological:  Negative for adenopathy. Does not bruise/bleed easily.   Psychiatric/Behavioral:  Negative for agitation, behavioral problems, confusion, decreased concentration, dysphoric mood, hallucinations, self-injury, sleep disturbance and suicidal ideas. The patient is not nervous/anxious and is not hyperactive.    Objective:     Vital Signs (Most Recent):  Temp: 97.3 °F (36.3 °C) (12/11/22 0722)  Pulse: 81 (12/11/22 0722)  Resp: 18 (12/11/22 0722)  BP: 126/76 (12/11/22 0722)  SpO2: 96 % (12/11/22 0722) Vital Signs (24h Range):  Temp:  [97.3 °F (36.3 °C)-98.4 °F (36.9 °C)] 97.3 °F (36.3 °C)  Pulse:  [] 81  Resp:  [16-20] 18  SpO2:  [92 %-99 %] 96 %  BP: (100-136)/(62-88) 126/76     Weight: 100.6 kg (221 lb 12.5 oz)  Body mass index is 36.91 kg/m².  Body surface area is 2.15 meters squared.      Intake/Output Summary (Last 24 hours) at 12/11/2022  0759  Last data filed at 12/11/2022 0345  Gross per 24 hour   Intake 1956.85 ml   Output --   Net 1956.85 ml       Physical Exam  Vitals reviewed.   Constitutional:       General: She is not in acute distress.     Appearance: She is well-developed. She is not diaphoretic.   HENT:      Head: Normocephalic and atraumatic.      Comments: Draining NG tube in place     Right Ear: External ear normal.      Left Ear: External ear normal.      Nose: Nose normal.      Right Sinus: No maxillary sinus tenderness or frontal sinus tenderness.      Left Sinus: No maxillary sinus tenderness or frontal sinus tenderness.      Mouth/Throat:      Pharynx: No oropharyngeal exudate.   Eyes:      General: Lids are normal. No scleral icterus.        Right eye: No discharge.         Left eye: No discharge.      Conjunctiva/sclera: Conjunctivae normal.      Right eye: Right conjunctiva is not injected. No hemorrhage.     Left eye: Left conjunctiva is not injected. No hemorrhage.     Pupils: Pupils are equal, round, and reactive to light.   Neck:      Thyroid: No thyromegaly.      Vascular: No JVD.      Trachea: No tracheal deviation.   Cardiovascular:      Rate and Rhythm: Normal rate.   Pulmonary:      Effort: Pulmonary effort is normal. No respiratory distress.      Breath sounds: No stridor.   Chest:      Chest wall: No tenderness.   Abdominal:      General: There is no distension.      Palpations: Abdomen is soft. There is no hepatomegaly, splenomegaly or mass.      Tenderness: There is no abdominal tenderness. There is no rebound.   Musculoskeletal:         General: No tenderness. Normal range of motion.      Cervical back: Normal range of motion and neck supple.   Lymphadenopathy:      Cervical: No cervical adenopathy.      Upper Body:      Right upper body: No supraclavicular adenopathy.      Left upper body: No supraclavicular adenopathy.   Skin:     General: Skin is dry.      Findings: No erythema or rash.   Neurological:       Mental Status: She is alert and oriented to person, place, and time.      Cranial Nerves: No cranial nerve deficit.      Coordination: Coordination normal.   Psychiatric:         Behavior: Behavior normal.         Thought Content: Thought content normal.         Judgment: Judgment normal.       Significant Labs:   BMP:   Recent Labs   Lab 12/10/22  1209 12/11/22  0544   * 95    139   K 3.8 4.1    104   CO2 21* 26   BUN 13 12   CREATININE 0.7 0.7   CALCIUM 10.2 8.9   , CBC:   Recent Labs   Lab 12/10/22  1209 12/11/22  0544   WBC 9.80 7.53   HGB 13.6 12.0   HCT 41.8 37.7    316   , CMP:   Recent Labs   Lab 12/10/22  1209 12/11/22  0544    139   K 3.8 4.1    104   CO2 21* 26   * 95   BUN 13 12   CREATININE 0.7 0.7   CALCIUM 10.2 8.9   PROT 7.9 6.3   ALBUMIN 4.2 3.4*   BILITOT 1.7* 1.6*   ALKPHOS 74 65   AST 21 17   ALT 18 12   ANIONGAP 14 9   , Coagulation: No results for input(s): PT, INR, APTT in the last 48 hours., Haptoglobin: No results for input(s): HAPTOGLOBIN in the last 48 hours., Immunology: No results for input(s): SPEP, SUZY, ANETA, FREELAMBDALI in the last 48 hours., LDH: No results for input(s): LDHCSF, BFSOURCE in the last 48 hours., LFTs:   Recent Labs   Lab 12/10/22  1209 12/11/22  0544   ALT 18 12   AST 21 17   ALKPHOS 74 65   BILITOT 1.7* 1.6*   PROT 7.9 6.3   ALBUMIN 4.2 3.4*   , Reticulocytes: No results for input(s): RETIC in the last 48 hours., Tumor Markers:   Recent Labs   Lab 12/10/22  2218   CEA <1.7   , Uric Acid No results for input(s): URICACID in the last 48 hours., and Urine Studies:   Recent Labs   Lab 12/10/22  1200   COLORU Yellow   APPEARANCEUA Clear   PHUR 6.0   SPECGRAV 1.020   PROTEINUA Trace*   GLUCUA Negative   KETONESU Negative   BILIRUBINUA Negative   OCCULTUA Negative   NITRITE Negative   UROBILINOGEN Negative   LEUKOCYTESUR Trace*   RBCUA 3   WBCUA 6*   BACTERIA Few*   SQUAMEPITHEL 10       Diagnostic Results:  I have reviewed all  pertinent imaging results/findings within the past 24 hours.

## 2022-12-11 NOTE — CONSULTS
'[place] [facility]  Hematology/Oncology  Consult Note    Patient Name: Becky Steen  MRN: [id number]  Admission Date: 12/10/2022  Hospital Length of Stay: 1 days  Code Status: Full Code   Attending Provider: Nehemiah Dixon MD  Consulting Provider: Kenton Christianson MD  Primary Care Physician: SANDEEP Perdue  Principal Problem:<principal problem not specified>    Consults  Subjective:     HPI:  62-year-old female history of abdominal pain over the last 2 weeks.  She states that his pain in her lower abdomen cramping in nature.  Patient that she did [facility] distant past she is not sure when aware.  Patient presents with abdominal pain and bowel obstruction with multiple areas of air-fluid level with mass in right lower quadrant I was asked to see the patient for further evaluation ECOG status 2 NG tube in place      Oncology Treatment Plan:   [No matching plan found]    Medications:  Continuous Infusions:   lactated ringers 75 mL/hr at 22 0345     Scheduled Meds:   amLODIPine  5 mg Oral Daily    atorvastatin  20 mg Oral Daily    enoxaparin  40 mg Subcutaneous Daily    hydroCHLOROthiazide  12.5 mg Oral Daily    losartan  100 mg Oral Daily    pantoprazole  40 mg Oral Daily     PRN Meds:acetaminophen, acetaminophen, albuterol-ipratropium, aluminum-magnesium hydroxide-simethicone, dextrose 10%, dextrose 10%, dextrose 10%, dextrose 10%, glucagon (human recombinant), glucagon (human recombinant), glucose, glucose, HYDROcodone-acetaminophen, insulin aspart U-100, melatonin, morphine, naloxone, ondansetron, promethazine, sodium chloride 0.9%     Review of patient's allergies indicates:  No Known Allergies     Past Medical History:   Diagnosis Date    Asthma     Diabetes mellitus     GERD (gastroesophageal reflux disease)     HLD (hyperlipidemia)     Hypertension     RA (rheumatoid arthritis)      Past Surgical History:   Procedure Laterality Date     SECTION        Family History       Problem Relation (Age of Onset)    Cancer Father    No Known Problems Mother          Tobacco Use    Smoking status: Never    Smokeless tobacco: Never   Substance and Sexual Activity    Alcohol use: Not Currently    Drug use: Never    Sexual activity: Not Currently       Review of Systems   Constitutional:  Negative for activity change, appetite change, chills, diaphoresis, fatigue, fever and unexpected weight change.   HENT:  Negative for congestion, dental problem, drooling, ear discharge, ear pain, facial swelling, hearing loss, mouth sores, nosebleeds, postnasal drip, rhinorrhea, sinus pressure, sneezing, sore throat, tinnitus, trouble swallowing and voice change.    Eyes:  Negative for photophobia, pain, discharge, redness, itching and visual disturbance.   Respiratory:  Negative for cough, choking, chest tightness, shortness of breath, wheezing and stridor.    Cardiovascular:  Negative for chest pain, palpitations and leg swelling.   Gastrointestinal:  Positive for abdominal distention, abdominal pain, nausea and vomiting. Negative for anal bleeding, blood in stool, constipation, diarrhea and rectal pain.   Endocrine: Negative for cold intolerance, heat intolerance, polydipsia, polyphagia and polyuria.   Genitourinary:  Negative for decreased urine volume, difficulty urinating, dyspareunia, dysuria, enuresis, flank pain, frequency, genital sores, hematuria, menstrual problem, pelvic pain, urgency, vaginal bleeding, vaginal discharge and vaginal pain.   Musculoskeletal:  Negative for arthralgias, back pain, gait problem, joint swelling, myalgias, neck pain and neck stiffness.   Skin:  Negative for color change, pallor and rash.   Allergic/Immunologic: Negative for environmental allergies, food allergies and immunocompromised state.   Neurological:  Negative for dizziness, tremors, seizures, syncope, facial asymmetry, speech difficulty, weakness, light-headedness, numbness and  headaches.   Hematological:  Negative for adenopathy. Does not bruise/bleed easily.   Psychiatric/Behavioral:  Negative for agitation, behavioral problems, confusion, decreased concentration, dysphoric mood, hallucinations, self-injury, sleep disturbance and suicidal ideas. The patient is not nervous/anxious and is not hyperactive.    Objective:     Vital Signs (Most Recent):  Temp: 97.3 °F (36.3 °C) (12/11/22 0722)  Pulse: 81 (12/11/22 0722)  Resp: 18 (12/11/22 0722)  BP: 126/76 (12/11/22 0722)  SpO2: 96 % (12/11/22 0722) Vital Signs (24h Range):  Temp:  [97.3 °F (36.3 °C)-98.4 °F (36.9 °C)] 97.3 °F (36.3 °C)  Pulse:  [] 81  Resp:  [16-20] 18  SpO2:  [92 %-99 %] 96 %  BP: (100-136)/(62-88) 126/76     Weight: 100.6 kg (221 lb 12.5 oz)  Body mass index is 36.91 kg/m².  Body surface area is 2.15 meters squared.      Intake/Output Summary (Last 24 hours) at 12/11/2022 0759  Last data filed at 12/11/2022 0345  Gross per 24 hour   Intake 1956.85 ml   Output --   Net 1956.85 ml       Physical Exam  Vitals reviewed.   Constitutional:       General: She is not in acute distress.     Appearance: She is well-developed. She is not diaphoretic.   HENT:      Head: Normocephalic and atraumatic.      Comments: Draining NG tube in place     Right Ear: External ear normal.      Left Ear: External ear normal.      Nose: Nose normal.      Right Sinus: No maxillary sinus tenderness or frontal sinus tenderness.      Left Sinus: No maxillary sinus tenderness or frontal sinus tenderness.      Mouth/Throat:      Pharynx: No oropharyngeal exudate.   Eyes:      General: Lids are normal. No scleral icterus.        Right eye: No discharge.         Left eye: No discharge.      Conjunctiva/sclera: Conjunctivae normal.      Right eye: Right conjunctiva is not injected. No hemorrhage.     Left eye: Left conjunctiva is not injected. No hemorrhage.     Pupils: Pupils are equal, round, and reactive to light.   Neck:      Thyroid: No  thyromegaly.      Vascular: No JVD.      Trachea: No tracheal deviation.   Cardiovascular:      Rate and Rhythm: Normal rate.   Pulmonary:      Effort: Pulmonary effort is normal. No respiratory distress.      Breath sounds: No stridor.   Chest:      Chest wall: No tenderness.   Abdominal:      General: There is no distension.      Palpations: Abdomen is soft. There is no hepatomegaly, splenomegaly or mass.      Tenderness: There is no abdominal tenderness. There is no rebound.   Musculoskeletal:         General: No tenderness. Normal range of motion.      Cervical back: Normal range of motion and neck supple.   Lymphadenopathy:      Cervical: No cervical adenopathy.      Upper Body:      Right upper body: No supraclavicular adenopathy.      Left upper body: No supraclavicular adenopathy.   Skin:     General: Skin is dry.      Findings: No erythema or rash.   Neurological:      Mental Status: She is alert and oriented to person, place, and time.      Cranial Nerves: No cranial nerve deficit.      Coordination: Coordination normal.   Psychiatric:         Behavior: Behavior normal.         Thought Content: Thought content normal.         Judgment: Judgment normal.       Significant Labs:   BMP:   Recent Labs   Lab 12/10/22  1209 12/11/22  0544   * 95    139   K 3.8 4.1    104   CO2 21* 26   BUN 13 12   CREATININE 0.7 0.7   CALCIUM 10.2 8.9   , CBC:   Recent Labs   Lab 12/10/22  1209 12/11/22  0544   WBC 9.80 7.53   HGB 13.6 12.0   HCT 41.8 37.7    316   , CMP:   Recent Labs   Lab 12/10/22  1209 12/11/22  0544    139   K 3.8 4.1    104   CO2 21* 26   * 95   BUN 13 12   CREATININE 0.7 0.7   CALCIUM 10.2 8.9   PROT 7.9 6.3   ALBUMIN 4.2 3.4*   BILITOT 1.7* 1.6*   ALKPHOS 74 65   AST 21 17   ALT 18 12   ANIONGAP 14 9   , Coagulation: No results for input(s): PT, INR, APTT in the last 48 hours., Haptoglobin: No results for input(s): HAPTOGLOBIN in the last 48 hours., Immunology:  No results for input(s): SPEP, SUZY, ANETA, FREELAMBDALI in the last 48 hours., LDH: No results for input(s): LDHCSF, BFSOURCE in the last 48 hours., LFTs:   Recent Labs   Lab 12/10/22  1209 12/11/22  0544   ALT 18 12   AST 21 17   ALKPHOS 74 65   BILITOT 1.7* 1.6*   PROT 7.9 6.3   ALBUMIN 4.2 3.4*   , Reticulocytes: No results for input(s): RETIC in the last 48 hours., Tumor Markers:   Recent Labs   Lab 12/10/22  2218   CEA <1.7   , Uric Acid No results for input(s): URICACID in the last 48 hours., and Urine Studies:   Recent Labs   Lab 12/10/22  1200   COLORU Yellow   APPEARANCEUA Clear   PHUR 6.0   SPECGRAV 1.020   PROTEINUA Trace*   GLUCUA Negative   KETONESU Negative   BILIRUBINUA Negative   OCCULTUA Negative   NITRITE Negative   UROBILINOGEN Negative   LEUKOCYTESUR Trace*   RBCUA 3   WBCUA 6*   BACTERIA Few*   SQUAMEPITHEL 10       Diagnostic Results:  I have reviewed all pertinent imaging results/findings within the past 24 hours.    Assessment/Plan:     Right lower quadrant abdominal mass  12/11/2022 Reviewed CT personally with patient demonstrating a mass in the right lower quadrant.  Highly suspect shows of malignancy.  At this time my recommendations are to proceed with surgical resection and relieve obstruction.  I have talked to the patient about the possible need for colostomy if unable to do end and anastomosis.  Will defer obviously to surgery in this regard.  Await histological confirmation of mass to see whether not malignant and then to make treatment recommendations.  Reviewed images personally with patient nurse present in room 12/11/2022    Type 2 diabetes mellitus, with long-term current use of insulin  Cared for by primary care team    Primary hypertension  Cared for by primary care team    Bowel obstruction  Bowel obstruction secondary to abdominal mass    Generalized abdominal pain  Secondary to bowel obstruction and abdominal mass        Thank you for your consult. I will follow-up with  patient. Please contact us if you have any additional questions.    Kenton Christianson MD  Hematology/Oncology  [facility]

## 2022-12-11 NOTE — ASSESSMENT & PLAN NOTE
Currently normotensive.  Plan:  -Optimize pain control   -Continue home medications, titrate as needed   -Monitor BP  -Low salt/cardiac diet when not NPO  -IV hydralazine prn for SBP>160 or DBP>90

## 2022-12-11 NOTE — HOSPITAL COURSE
Admitted for right lower quadrant abdominal mass causing bowel obstruction. POD5 S/p ex lap with ileocolonic diversion and biopsy of intra-abdominal mass. Tolerating clear liquid diet, denies abdominal pain, nausea, vomiting. Diet advanced to regular and patient tolerated well, started having BMs

## 2022-12-11 NOTE — ASSESSMENT & PLAN NOTE
Appears secondary to intra-abdominal mass.  Unclear etiology.    NG tube to low intermittent wall suction   NPO, IV fluids    Exploratory laparotomy for further evaluation and treatment.  We discussed possibility of resection versus bypass depending on resectability at the time of surgery.  She may also need a diverting ostomy.  Will have ureteral stents placed  Risks and benefits discussed with patient including but not limited to: pain, bleeding, infection, scarring, injury to underlying organs/structures, leak, stricture, need for further treatment

## 2022-12-11 NOTE — SUBJECTIVE & OBJECTIVE
Interval History: See hospital course for today      Review of Systems   Constitutional:  Negative for activity change, appetite change, fatigue and fever.   Respiratory:  Negative for shortness of breath.    Cardiovascular:  Negative for chest pain.   Gastrointestinal:  Positive for abdominal pain, diarrhea and nausea.   Neurological:  Negative for weakness.   Psychiatric/Behavioral:  Negative for agitation, behavioral problems, confusion, decreased concentration and dysphoric mood. The patient is nervous/anxious.    Objective:     Vital Signs (Most Recent):  Temp: 97.3 °F (36.3 °C) (12/11/22 0722)  Pulse: 79 (12/11/22 0847)  Resp: 18 (12/11/22 0847)  BP: 126/76 (12/11/22 0722)  SpO2: 95 % (12/11/22 0847) Vital Signs (24h Range):  Temp:  [97.3 °F (36.3 °C)-98.4 °F (36.9 °C)] 97.3 °F (36.3 °C)  Pulse:  [] 79  Resp:  [16-20] 18  SpO2:  [92 %-99 %] 95 %  BP: (100-136)/(62-88) 126/76     Weight: 100.6 kg (221 lb 12.5 oz)  Body mass index is 36.91 kg/m².    Intake/Output Summary (Last 24 hours) at 12/11/2022 1028  Last data filed at 12/11/2022 0345  Gross per 24 hour   Intake 1956.85 ml   Output --   Net 1956.85 ml      Physical Exam  Vitals and nursing note reviewed.   Constitutional:       General: She is not in acute distress.     Appearance: She is obese. She is ill-appearing. She is not toxic-appearing.   HENT:      Head: Normocephalic and atraumatic.     Cardiovascular:      Rate and Rhythm: Normal rate.   Pulmonary:      Effort: Pulmonary effort is normal. No respiratory distress.   Abdominal:      General: Bowel sounds are normal.      Palpations: Abdomen is soft.      Tenderness: There is no abdominal tenderness.   Musculoskeletal:      Right lower leg: No edema.      Left lower leg: No edema.   Skin:     General: Skin is warm.   Neurological:      Mental Status: She is alert and oriented to person, place, and time.   Psychiatric:         Mood and Affect: Mood is anxious.         Speech: Speech normal.          Behavior: Behavior normal. Behavior is cooperative.       Significant Labs: All pertinent labs within the past 24 hours have been reviewed.  CBC:   Recent Labs   Lab 12/10/22  1209 12/11/22  0544   WBC 9.80 7.53   HGB 13.6 12.0   HCT 41.8 37.7    316     CMP:   Recent Labs   Lab 12/10/22  1209 12/11/22  0544    139   K 3.8 4.1    104   CO2 21* 26   * 95   BUN 13 12   CREATININE 0.7 0.7   CALCIUM 10.2 8.9   PROT 7.9 6.3   ALBUMIN 4.2 3.4*   BILITOT 1.7* 1.6*   ALKPHOS 74 65   AST 21 17   ALT 18 12   ANIONGAP 14 9       Significant Imaging: I have reviewed all pertinent imaging results/findings within the past 24 hours.  CT: I have reviewed all pertinent results/findings within the past 24 hours and my personal findings are:  abdomen-soft tissue mass 11cm, peritoneal carinomatosis  Kub-satisfactory placement of ngt

## 2022-12-11 NOTE — HPI
Becky Steen is a 57 y.o. female with a PMH  has a past medical history of Asthma, Diabetes mellitus, GERD (gastroesophageal reflux disease), HLD (hyperlipidemia), and Hypertension. who presented as a transfer from [facility] for higher level of care after patient presented with complaints of generalized abdominal pain with associated nausea and vomiting and was found to have evidence of a soft tissue mass arising from the cecum/distal small bowel causing bowel obstruction noted on CT abdomen/pelvis. Patient reported acute onset and progressively worsening generalized abdominal pain which began approximately 2 weeks prior to admission. She reported pain is intermittent in frequency and described as a sharp/labored like pains throughout her entire abdomen, currently rated 0/10 in severity but worsens to 10/10 in severity with no known alleviating or aggravating factors noted.  Associated symptoms included nausea, vomiting, constipation, and decreased oral intake but denied endorsing any fever, chills, sweats, chest pain, shortness a breath, dysuria, hematuria, hematochezia, melena, or neurological deficits.  Patient reported having small bowel movement yesterday with intermittent bowel movements throughout the week reported loose/watery nature.  Patient reported last colonoscopy was within [facility] and was told to come back 10 years later for repeat.  Patient follows Dr. Alcala at [facility] for OBGYN with last reported mg obtain last year and Pap smear few months ago prior to admission and reported both were normal.  Patient reports history of skin cancer in her father but is unaware of any other known family history of cancers.  Prior to onset of symptoms, patient reported being in her usual state of health no other concerns or complaints.  All other review of systems negative except as noted above.  Patient admitted to Hospital Medicine for continued medical management of bowel  obstruction and further cancer workup. General Surgery aware of patient and awaiting further evaluation in the morning.     PCP: Kerry Aguilar

## 2022-12-11 NOTE — ASSESSMENT & PLAN NOTE
Patient presented with worsening abdominal pain with associated nausea, vomiting, decreased p.o. intake, and constipation x2 weeks' duration and was found to have evidence of bowel obstruction associated with soft tissue mass.  CT abdomen/pelvis revealed an 11 cm soft tissue mass either arising from the cecum or distal small bowel consistent with malignancy causing a high-grade distal mechanical small bowel obstruction in addition to presence of peritoneal carcinomatosis.  Patient remains afebrile without leukocytosis and remains hemodynamically stable.  CMP, CBC, LFTs, and lipase within normal limits.  UA negative for UTI.  Patient status post NG tube insertion and currently on suction.  General surgery consulted and awaiting further evaluation/recommendations.  CEA and  ordered and pending prior to transfer.  Oncology consulted for malignant workup and to establish care.    Plan:  -NPO  -Continue current pain regimen, titrate as needed  -Bowel regimen  -Bedrest  -Continue NGT decompression  -IVFs prn  -f/u CEA and   -Antiemetics prn  -Tylenol as needed for fever   -f/u general surgery and oncology  -PT/OT

## 2022-12-11 NOTE — HPI
62-year-old female history of abdominal pain over the last 2 weeks.  She states that his pain in her lower abdomen cramping in nature.  Patient that she did have a [facility]oscopy a [facility] in distant past she is not sure when aware.  Patient presents with abdominal pain and bowel obstruction with multiple areas of air-fluid level with mass in right lower quadrant I was asked to see the patient for further evaluation ECOG status 2 NG tube in place

## 2022-12-11 NOTE — H&P
[facility]  History & Physical    Patient Name: Becky Steen  MRN: [id number]  Patient Class: IP- Inpatient  Admission Date: 12/10/2022  Attending Physician: Armond Yeager MD   Primary Care Provider: SANDEEP Perdue         Patient information was obtained from patient, past medical records and ER records.     Subjective:     Principal Problem:<principal problem not specified>    Chief Complaint:   Chief Complaint   Patient presents with    Abdominal Pain     Upper abd pain that radiates down to the bottom of abd area, nausea, onset 2 weeks ago         HPI: Becky Steen is a 57 y.o. female with a PMH  has a past medical history of Asthma, Diabetes mellitus, GERD (gastroesophageal reflux disease), HLD (hyperlipidemia), and Hypertension. who presented as a transfer from [facility] for higher level of care after patient presented with complaints of generalized abdominal pain with associated nausea and vomiting and was found to have evidence of a soft tissue mass arising from the cecum/distal small bowel causing bowel obstruction noted on CT abdomen/pelvis. Patient reported acute onset and progressively worsening generalized abdominal pain which began approximately 2 weeks prior to admission. She reported pain is intermittent in frequency and described as a sharp/labored like pains throughout her entire abdomen, currently rated 0/10 in severity but worsens to 10/10 in severity with no known alleviating or aggravating factors noted.  Associated symptoms included nausea, vomiting, constipation, and decreased oral intake but denied endorsing any fever, chills, sweats, chest pain, shortness a breath, dysuria, hematuria, hematochezia, melena, or neurological deficits.  Patient reported having small bowel movement yesterday with intermittent bowel movements throughout the week reported loose/watery nature.  Patient reported last colonoscopy was within 10 years with [facility] and was  told to come back 10 years later for repeat.  Patient follows Dr. Alcala at [facility] for OBGYN with last reported mg obtain last year and Pap smear few months ago prior to admission and reported both were normal.  Patient reports history of skin cancer in her father but is unaware of any other known family history of cancers.  Prior to onset of symptoms, patient reported being in her usual state of health no other concerns or complaints.  All other review of systems negative except as noted above.  Patient admitted to Hospital Medicine for continued medical management of bowel obstruction and further cancer workup. General Surgery aware of patient and awaiting further evaluation in the morning.     PCP: Kerry Aguilar        Past Medical History:   Diagnosis Date    Asthma     Diabetes mellitus     GERD (gastroesophageal reflux disease)     HLD (hyperlipidemia)     Hypertension        Past Surgical History:   Procedure Laterality Date     SECTION         Review of patient's allergies indicates:  No Known Allergies    No current facility-administered medications on file prior to encounter.     Current Outpatient Medications on File Prior to Encounter   Medication Sig    amLODIPine (NORVASC) 5 MG tablet Take 5 mg by mouth once daily.    atorvastatin (LIPITOR) 20 MG tablet Take 20 mg by mouth.    budesonide-formoterol 160-4.5 mcg (SYMBICORT) 160-4.5 mcg/actuation HFAA Symbicort 160 mcg-4.5 mcg/actuation HFA aerosol inhaler    celecoxib (CELEBREX) 200 MG capsule celecoxib 200 mg capsule   TAKE 1 CAPSULE BY MOUTH TWICE A DAY    ergocalciferol (ERGOCALCIFEROL) 50,000 unit Cap ergocalciferol (vitamin D2) 1,250 mcg (50,000 unit) capsule   Take 1 capsule every week by oral route.    fluticasone propionate (FLONASE) 50 mcg/actuation nasal spray SMARTSI Spray(s) Both Nares Every Morning    HYDROcodone-acetaminophen (NORCO) 7.5-325 mg per tablet hydrocodone 7.5 mg-acetaminophen 325 mg  tablet    LEVEMIR FLEXTOUCH U-100 INSULN 100 unit/mL (3 mL) InPn pen SMARTSI Unit(s) SUB-Q Daily    loratadine (CLARITIN) 10 mg tablet loratadine 10 mg tablet   Take 1 tablet every day by oral route.    losartan-hydrochlorothiazide 100-12.5 mg (HYZAAR) 100-12.5 mg Tab Take 1 tablet by mouth once daily.    metFORMIN (GLUCOPHAGE-XR) 500 MG ER 24hr tablet metformin  mg tablet,extended release 24 hr   TAKE 1 TABLET BY MOUTH EVERY DAY    methotrexate (XATMEP ORAL) methotrexate    montelukast (SINGULAIR) 10 mg tablet montelukast 10 mg tablet    omeprazole (PRILOSEC) 40 MG capsule omeprazole 40 mg capsule,delayed release    albuterol (PROVENTIL/VENTOLIN HFA) 90 mcg/actuation inhaler Inhale 2 puffs into the lungs every 6 (six) hours as needed for Wheezing.    [DISCONTINUED] methotrexate 2.5 MG Tab Take by mouth every 7 days.     Family History       Problem Relation (Age of Onset)    No Known Problems Mother, Father          Tobacco Use    Smoking status: Never    Smokeless tobacco: Never   Substance and Sexual Activity    Alcohol use: Not Currently    Drug use: Never    Sexual activity: Not Currently     Review of Systems   All other systems reviewed and are negative.  Objective:     Vital Signs (Most Recent):  Temp: 97.9 °F (36.6 °C) (12/10/22 2136)  Pulse: 100 (12/10/22 2136)  Resp: 17 (12/10/22 2136)  BP: 133/81 (12/10/22 2136)  SpO2: 99 % (12/10/22 2136) Vital Signs (24h Range):  Temp:  [97.9 °F (36.6 °C)-98.4 °F (36.9 °C)] 97.9 °F (36.6 °C)  Pulse:  [] 100  Resp:  [16-20] 17  SpO2:  [92 %-99 %] 99 %  BP: (100-136)/(62-88) 133/81     Weight: 100.8 kg (222 lb 1.8 oz)  Body mass index is 36.96 kg/m².    Physical Exam  Vitals reviewed.   Constitutional:       General: She is not in acute distress.     Appearance: Normal appearance. She is obese. She is not ill-appearing, toxic-appearing or diaphoretic.   HENT:      Head: Normocephalic and atraumatic.      Right Ear: External ear normal.       Left Ear: External ear normal.      Nose: Nose normal. No congestion or rhinorrhea.      Mouth/Throat:      Mouth: Mucous membranes are moist.      Pharynx: Oropharynx is clear. No oropharyngeal exudate or posterior oropharyngeal erythema.   Eyes:      General: No scleral icterus.     Extraocular Movements: Extraocular movements intact.      Conjunctiva/sclera: Conjunctivae normal.      Pupils: Pupils are equal, round, and reactive to light.   Neck:      Vascular: No carotid bruit.   Cardiovascular:      Rate and Rhythm: Normal rate and regular rhythm.      Pulses: Normal pulses.      Heart sounds: Normal heart sounds. No murmur heard.    No friction rub. No gallop.   Pulmonary:      Effort: Pulmonary effort is normal. No respiratory distress.      Breath sounds: Normal breath sounds. No stridor. No wheezing, rhonchi or rales.   Chest:      Chest wall: No tenderness.   Abdominal:      General: There is no distension.      Palpations: Abdomen is soft.      Tenderness: There is abdominal tenderness. There is no right CVA tenderness, left CVA tenderness, guarding or rebound.      Hernia: No hernia is present.      Comments: Obese abdomen. Decreased bowel sounds noted throughout. TTP throughout without evidence of guarding or rebound tenderness noted. NGT in placed hooked to suction.    Musculoskeletal:         General: No swelling, tenderness, deformity or signs of injury. Normal range of motion.      Cervical back: Normal range of motion and neck supple. No rigidity or tenderness.      Right lower leg: No edema.      Left lower leg: No edema.   Lymphadenopathy:      Cervical: No cervical adenopathy.   Skin:     General: Skin is warm and dry.      Capillary Refill: Capillary refill takes less than 2 seconds.      Coloration: Skin is not jaundiced or pale.      Findings: No bruising, erythema, lesion or rash.   Neurological:      General: No focal deficit present.      Mental Status: She is alert and oriented to person,  place, and time. Mental status is at baseline.      Cranial Nerves: No cranial nerve deficit.      Sensory: No sensory deficit.      Motor: No weakness.      Coordination: Coordination normal.   Psychiatric:         Mood and Affect: Mood normal.         Behavior: Behavior normal.         Thought Content: Thought content normal.         Judgment: Judgment normal.         CRANIAL NERVES     CN III, IV, VI   Pupils are equal, round, and reactive to light.     Significant Labs: All pertinent labs within the past 24 hours have been reviewed.    Significant Imaging: I have reviewed all pertinent imaging results/findings within the past 24 hours.    LABS:  Recent Results (from the past 24 hour(s))   Urinalysis, Reflex to Urine Culture Urine, Clean Catch    Collection Time: 12/10/22 12:00 PM    Specimen: Urine   Result Value Ref Range    Specimen UA Urine, Clean Catch     Color, UA Yellow Yellow, Straw, Yadira    Appearance, UA Clear Clear    pH, UA 6.0 5.0 - 8.0    Specific Gravity, UA 1.020 1.005 - 1.030    Protein, UA Trace (A) Negative    Glucose, UA Negative Negative    Ketones, UA Negative Negative    Bilirubin (UA) Negative Negative    Occult Blood UA Negative Negative    Nitrite, UA Negative Negative    Urobilinogen, UA Negative <2.0 EU/dL    Leukocytes, UA Trace (A) Negative   Urinalysis Microscopic    Collection Time: 12/10/22 12:00 PM   Result Value Ref Range    RBC, UA 3 0 - 4 /hpf    WBC, UA 6 (H) 0 - 5 /hpf    Bacteria Few (A) None-Occ /hpf    Squam Epithel, UA 10 /hpf    Microscopic Comment SEE COMMENT    CBC W/ AUTO DIFFERENTIAL    Collection Time: 12/10/22 12:09 PM   Result Value Ref Range    WBC 9.80 3.90 - 12.70 K/uL    RBC 4.73 4.00 - 5.40 M/uL    Hemoglobin 13.6 12.0 - 16.0 g/dL    Hematocrit 41.8 37.0 - 48.5 %    MCV 88 82 - 98 fL    MCH 28.8 27.0 - 31.0 pg    MCHC 32.5 32.0 - 36.0 g/dL    RDW 14.0 11.5 - 14.5 %    Platelets 377 150 - 450 K/uL    MPV 10.7 9.2 - 12.9 fL    Immature Granulocytes 0.2 0.0 -  0.5 %    Gran # (ANC) 7.6 1.8 - 7.7 K/uL    Immature Grans (Abs) 0.02 0.00 - 0.04 K/uL    Lymph # 1.2 1.0 - 4.8 K/uL    Mono # 0.9 0.3 - 1.0 K/uL    Eos # 0.1 0.0 - 0.5 K/uL    Baso # 0.03 0.00 - 0.20 K/uL    nRBC 0 0 /100 WBC    Gran % 77.7 (H) 38.0 - 73.0 %    Lymph % 11.9 (L) 18.0 - 48.0 %    Mono % 9.0 4.0 - 15.0 %    Eosinophil % 0.9 0.0 - 8.0 %    Basophil % 0.3 0.0 - 1.9 %    Differential Method Automated    Comp. Metabolic Panel    Collection Time: 12/10/22 12:09 PM   Result Value Ref Range    Sodium 137 136 - 145 mmol/L    Potassium 3.8 3.5 - 5.1 mmol/L    Chloride 102 95 - 110 mmol/L    CO2 21 (L) 23 - 29 mmol/L    Glucose 116 (H) 70 - 110 mg/dL    BUN 13 6 - 20 mg/dL    Creatinine 0.7 0.5 - 1.4 mg/dL    Calcium 10.2 8.7 - 10.5 mg/dL    Total Protein 7.9 6.0 - 8.4 g/dL    Albumin 4.2 3.5 - 5.2 g/dL    Total Bilirubin 1.7 (H) 0.1 - 1.0 mg/dL    Alkaline Phosphatase 74 55 - 135 U/L    AST 21 10 - 40 U/L    ALT 18 10 - 44 U/L    Anion Gap 14 8 - 16 mmol/L    eGFR >60.0 >60 mL/min/1.73 m^2   Lipase    Collection Time: 12/10/22 12:09 PM   Result Value Ref Range    Lipase 7 4 - 60 U/L   Troponin I    Collection Time: 12/10/22 12:09 PM   Result Value Ref Range    Troponin I <0.006 0.000 - 0.026 ng/mL   Brain natriuretic peptide    Collection Time: 12/10/22 12:09 PM   Result Value Ref Range    BNP <10 0 - 99 pg/mL       RADIOLOGY  X-Ray Chest 1 View    Result Date: 12/10/2022  EXAMINATION: XR CHEST 1 VIEW CLINICAL HISTORY: Presence of other specified devices TECHNIQUE: Single frontal view of the chest was performed. COMPARISON: None FINDINGS: Satisfactory nasogastric tube.The lungs are clear, with normal appearance of pulmonary vasculature and no pleural effusion or pneumothorax. The cardiac silhouette is normal in size. The hilar and mediastinal contours are unremarkable. Bones are intact.     Satisfactory nasogastric tube with tip in the stomach. Electronically signed by: Miguelito Wilkinson Date:    12/10/2022  Time:    18:19    CT Abdomen Pelvis With Contrast    Result Date: 12/10/2022  EXAMINATION: CT ABDOMEN PELVIS WITH CONTRAST CLINICAL HISTORY: Abdominal pain, acute, nonlocalized; TECHNIQUE: Axial CT imaging was performed through the abdomen and pelvis with  75cc  of intravenous contrast. Multiplanar reformats were performed and interpreted. COMPARISON: None FINDINGS: Clustered micronodular opacities in the right lower lobe, likely infectious. There is an 11.7 x 11.1 x 9.9 cm soft tissue mass located in the right lower quadrant that either arises from the cecum or a distal small bowel loops that results in high-grade mechanical small bowel obstruction.  Upstream moderate dilatation of multiple loops of small bowel with air-fluid levels.  There are scattered soft tissue nodules throughout the right side of the mesentery and in the pelvis consistent with peritoneal carcinomatosis.  Trace perihepatic ascites.  No free air or abscess. The gallbladder is unremarkable. The abdominal aorta is normal in caliber. The urinary bladder is unremarkable. No significant osseous abnormality is identified.     11 cm soft tissue mass either arising from the cecum or distal small bowel consistent with malignancy.  The mass causes a high-grade distal mechanical small bowel obstruction.  Positive for peritoneal carcinomatosis. All CT scans at this facility are performed  using dose modulation techniques as appropriate to performed exam including the following:  automated exposure control; adjustment of mA and/or kV according to the patients size (this includes techniques or standardized protocols for targeted exams where dose is matched to indication/reason for exam: i.e. extremities or head);  iterative reconstruction technique. Electronically signed by: Yeison Aldridge MD Date:    12/10/2022 Time:    14:31      EKG    MICROBIOLOGY    [facility]        Assessment/Plan:     Nausea and vomiting    Bowel obstruction    Generalized abdominal  pain  Patient presented with worsening abdominal pain with associated nausea, vomiting, decreased p.o. intake, and constipation x2 weeks' duration and was found to have evidence of bowel obstruction associated with soft tissue mass.  CT abdomen/pelvis revealed an 11 cm soft tissue mass either arising from the cecum or distal small bowel consistent with malignancy causing a high-grade distal mechanical small bowel obstruction in addition to presence of peritoneal carcinomatosis.  Patient remains afebrile without leukocytosis and remains hemodynamically stable.  CMP, CBC, LFTs, and lipase within normal limits.  UA negative for UTI.  Patient status post NG tube insertion and currently on suction.  General surgery consulted and awaiting further evaluation/recommendations.  CEA and  ordered and pending prior to transfer.  Oncology consulted for malignant workup and to establish care.    Plan:  -NPO  -Continue current pain regimen, titrate as needed  -Bowel regimen  -Bedrest  -Continue NGT decompression  -IVFs prn  -f/u CEA and   -Antiemetics prn  -Tylenol as needed for fever   -f/u general surgery and oncology  -PT/OT       Primary hypertension  Currently normotensive.  Plan:  -Optimize pain control   -Continue home medications, titrate as needed   -Monitor BP  -Low salt/cardiac diet when not NPO  -IV hydralazine prn for SBP>160 or DBP>90         Type 2 diabetes mellitus, with long-term current use of insulin  Patient's FSGs are controlled on current medication regimen.  Last A1c reviewed- No results found for: LABA1C, HGBA1C  Most recent fingerstick glucose reviewed- No results for input(s): POCTGLUCOSE in the last 24 hours.  Current correctional scale  low  titrate anti-hyperglycemic dose as follows-   Antihyperglycemics (From admission, onward)    None      Plan:  -SSI  -Continue home insulin at decreased dose and titrate as needed  -Hold oral hypoglycemics while patient is in the hospital  -Hypoglycemic  protocol       Asthma  Patient with history of Asthma currently on inhalers but not home oxygen. Not presently in acute exacerbation and is without signs/symptoms of distress. Patient currently saturating  99% on 2 L/min. CXR revealed satisfactory nasogastric tube with clear lungs and normal appearance of pulmonary vasculature without evidence of pleural effusion or pneumothorax.   Plan:  -Continue home medications, titrate as needed  -Titrate oxygen requirements as needed  -Incentive spirometry   -Monitor pulse oximetry  -Duonebs prn      Gastroesophageal reflux disease without esophagitis  Chronic. Stable. Currently asymptomatic. Home medications include PPI/Antacids as needed.  Plan:  -Continue PPI/Antacids as needed         Class 2 severe obesity due to excess calories with serious comorbidity and body mass index (BMI) of 36.0 to 36.9 in adult  Body mass index is 36.96 kg/m². Elevation likely secondary to increased calorie intake and sedentary lifestyle. Patient educated on morbidity and mortality in regards to elevated BMI and stressed importance of diet and exercise.   Plan:  -low fat/low calorie diet       VTE Risk Mitigation (From admission, onward)         Ordered     enoxaparin injection 40 mg  Daily         12/10/22 2225     IP VTE HIGH RISK PATIENT  Once         12/10/22 2225     Place sequential compression device  Until discontinued         12/10/22 2225              //Core Measures   -DVT proph: SCDs, withholding anticoagulation pending surgical intervention   -Code status Full    -Surrogate: spouse      Components of this note were documented using a voice recognition system and are subject to errors not corrected at the time the document was proof read. Please contact the author for any clarifications.        Armond Yegaer MD  Department of Hospital Medicine   [facility]

## 2022-12-11 NOTE — PLAN OF CARE
[facility]  Initial Discharge Assessment       Primary Care Provider: SANDEEP Perdue    Admission Diagnosis: Right lower quadrant abdominal mass [R19.03]  Abdominal pain [R10.9]  Intestinal obstruction, unspecified cause, unspecified whether partial or complete [K56.609]  Nasogastric tube present [Z97.8]    Admission Date: 12/10/2022  Expected Discharge Date:     Discharge Barriers Identified: None    Payor: [employer] / Plan: [employer] MEDICARE HMO / Product Type: Medicare Advantage /     Extended Emergency Contact Information  Primary Emergency Contact: BjNorma earlh  Mobile Phone: [phone]  Relation: Relative   needed? No    Discharge Plan A: Home         [id number] - [place], [place] - [id number]  [id number]  [place] [address]  Phone: [phone] Fax: [phone]      Initial Assessment (most recent)       Adult Discharge Assessment - 12/11/22 1056          Discharge Assessment    Assessment Type Discharge Planning Assessment     Confirmed/corrected address, phone number and insurance Yes     Confirmed Demographics Correct on Facesheet     Source of Information patient     Communicated FABIANA with patient/caregiver Yes     People in Home child(melchor), dependent     Do you expect to return to your current living situation? Yes     Do you have help at home or someone to help you manage your care at home? No     Prior to hospitilization cognitive status: Alert/Oriented     Current cognitive status: Alert/Oriented     Equipment Currently Used at Home none     Readmission within 30 days? No     Patient currently being followed by outpatient case management? No     Do you currently have service(s) that help you manage your care at home? No     Do you take prescription medications? No     Do you have prescription coverage? Yes     Do you have any problems affording any of your prescribed medications? No     Is the patient taking medications as prescribed? yes     Who is  going to help you get home at discharge? family     How do you get to doctors appointments? car, drives self     Are you on dialysis? No     Do you take coumadin? No     Discharge Plan A Home     DME Needed Upon Discharge  none     Discharge Plan discussed with: Patient     Discharge Barriers Identified None                   SW met with pt at bedside. SW explained role. Pt is independent with ADLs and has no discharge needs at this time. SW provided a transitional care folder, information on advanced directives, information on pharmacy bedside delivery, and discharge planning begins on admission with contact information for any needs/questions.

## 2022-12-11 NOTE — TRANSFER OF CARE
"Anesthesia Transfer of Care Note    Patient: Becky Steen    Procedure(s) Performed: Procedure(s) (LRB):  LAPAROTOMY, EXPLORATORY (N/A)  CYSTOSCOPY, WITH URETERAL STENT INSERTION  BLOCK, TRANSVERSUS ABDOMINIS PLANE (N/A)    Patient location: PACU    Anesthesia Type: general    Transport from OR: Transported from OR on room air with adequate spontaneous ventilation    Post pain: adequate analgesia    Post assessment: no apparent anesthetic complications    Post vital signs: stable    Level of consciousness: awake and responds to stimulation    Nausea/Vomiting: no nausea/vomiting    Complications: none    Transfer of care protocol was followed      Last vitals:   Visit Vitals  /76 (BP Location: Left arm, Patient Position: Lying)   Pulse 79   Temp 36.3 °C (97.3 °F) (Oral)   Resp 18   Ht 5' 5" (1.651 m)   Wt 100.6 kg (221 lb 12.5 oz)   LMP  (LMP Unknown)   SpO2 95%   BMI 36.91 kg/m²     "

## 2022-12-11 NOTE — ASSESSMENT & PLAN NOTE
Patient presented with worsening abdominal pain with associated nausea, vomiting, decreased p.o. intake, and constipation x2 weeks' duration and was found to have evidence of bowel obstruction associated with soft tissue mass.  CT abdomen/pelvis revealed an 11 cm soft tissue mass either arising from the cecum or distal small bowel consistent with malignancy causing a high-grade distal mechanical small bowel obstruction in addition to presence of peritoneal carcinomatosis.  Patient remains afebrile without leukocytosis and remains hemodynamically stable.  CMP, CBC, LFTs, and lipase within normal limits.  UA negative for UTI.  Patient status post NG tube insertion and currently on suction.  General surgery consulted and awaiting further evaluation/recommendations.  CEA and  ordered and pending prior to transfer.  Oncology consulted for malignant workup and to establish care.    Plan:  -NPO  -Continue current pain regimen, titrate as needed  -Bowel regimen  -Bedrest  -Continue NGT decompression  -IVFs prn  -f/u CEA and   -Antiemetics prn  -Tylenol as needed for fever   -f/u general surgery and oncology  -PT/OT     12/11  cea within normal limits  ca125 mildly elevated  Awaiting surgery today  Continue ngt

## 2022-12-11 NOTE — ASSESSMENT & PLAN NOTE
12/11/2022 Reviewed CT personally with patient demonstrating a mass in the right lower quadrant.  Highly suspect shows of malignancy.  At this time my recommendations are to proceed with surgical resection and relieve obstruction.  I have talked to the patient about the possible need for colostomy if unable to do end and anastomosis.  Will defer obviously to surgery in this regard.  Await histological confirmation of mass to see whether not malignant and then to make treatment recommendations.  Reviewed images personally with patient nurse present in room 12/11/2022

## 2022-12-11 NOTE — OP NOTE
[facility] - Surgery ([facility])  Surgery Department  Operative Note    SUMMARY     Date of Procedure: 12/11/2022     Procedure:   Exploratory Laparotomy    Transversus abdominus plane block    Surgeon(s) and Role:  Panel 1:     * Gustabo Davis MD - Primary     * Leonel Browning MD - Assisting    Pre-Operative Diagnosis: Intestinal obstruction, unspecified cause, unspecified whether partial or complete [K56.609]  Right lower quadrant abdominal mass [R19.03]    Post-Operative Diagnosis: Post-Op Diagnosis Codes:     * Intestinal obstruction, unspecified cause, unspecified whether partial or complete [K56.609]     * Right lower quadrant abdominal mass [R19.03]    Anesthesia: General    Operative Findings (including complications, if any): Exploratory Laparotomy  Ileocolonic bypass  Resection of omental nodule  Transversus abdominus plane block    Description of Technical Procedures: large malignant mass in the right lower quadrant involving the cecum appendix ovary extending into the sidewall and retroperitoneum, multiple metastatic appearing nodules along the terminal ileum and peritoneum, large metastatic appearing deposits in the omentum resected    Significant Surgical Tasks Conducted by the Assistant(s), if Applicable:  Assistance with surgery    Estimated Blood Loss (EBL): 10cc           Implants:   Implant Name Type Inv. Item Serial No.  Lot No. LRB No. Used Action   STENT URETERAL UNIV 7FR 26CM - [id number]  STENT URETERAL UNIV 7FR 26CM  [employer]. [id number] Right 1 Implanted and Explanted       Specimens:   Specimen (24h ago, onward)       Start     Ordered    12/11/22 1401  Specimen to Pathology, Surgery General Surgery  Once        Comments: Pre-op Diagnosis: Intestinal obstruction, unspecified cause, unspecified whether partial or complete [K56.609]Right lower quadrant abdominal mass [R19.03]Procedure(s):LAPAROTOMY, EXPLORATORYCYSTOSCOPY, WITH URETERAL STENT INSERTION Number of specimens: 1Name of  specimens: 1. Omental nodule (perm)     References:    Click here for ordering Quick Tip   Question Answer Comment   Procedure Type: General Surgery    Specimen Class: Routine/Screening    Which provider would you like to cc? AKI SUERO    Release to patient Immediate        12/11/22 9812                            Condition: Good    Disposition: PACU - hemodynamically stable.    Procedure in detail:   The patient was brought to the OR and underwent general anesthesia.  Dr. Gleason placed right sided ureteral stent prior to surgery.  The patient's abdomen was prepped and draped in usual sterile fashion.  A midline incision was made and abdomen entered.  Upon inspection of the abdomen there were multiple metastatic appearing nodules consistent with carcinomatosis of the peritoneum extending down to the pelvic region, terminal ileum implants, large malignant appearing mass involving the cecum appendix terminal ileum right ovary and fallopian tube.  There was a large metastatic appearing nodule of the omentum.  This was resected with the EnSeal Super jaw and sent for specimen.  Due to the mass fixated to the retroperitoneum and surrounding structures with concerns for widely metastatic disease we elected to bypass the area.  A side-to-side ileocolonic (ileum to transverse colon) anastomosis was performed.  The MAHNAZ 75 mm stapler with blue load was used to make a common channel and the opening was closed in 2 layers using a 3-0 Vicryl running suture.  A 3-0 silk Lembert suture was placed over the closure.  The abdomen was inspected and hemostasis noted.  A transversus abdominis plane block was performed by injecting a mixture of Exparel into the fascial layers.  The fascia was closed using 1. Looped PDS.  Skin was reapproximated with skin staples.  Sterile dressings were applied. At the end of the procedure the the ureteral stents were removed and Cordova kept in place.

## 2022-12-12 LAB
ALBUMIN SERPL BCP-MCNC: 3.1 G/DL (ref 3.5–5.2)
ALP SERPL-CCNC: 57 U/L (ref 55–135)
ALT SERPL W/O P-5'-P-CCNC: 14 U/L (ref 10–44)
ANION GAP SERPL CALC-SCNC: 11 MMOL/L (ref 8–16)
AST SERPL-CCNC: 20 U/L (ref 10–40)
BASOPHILS # BLD AUTO: 0.03 K/UL (ref 0–0.2)
BASOPHILS NFR BLD: 0.2 % (ref 0–1.9)
BILIRUB SERPL-MCNC: 1.3 MG/DL (ref 0.1–1)
BUN SERPL-MCNC: 9 MG/DL (ref 6–20)
CALCIUM SERPL-MCNC: 9 MG/DL (ref 8.7–10.5)
CHLORIDE SERPL-SCNC: 101 MMOL/L (ref 95–110)
CO2 SERPL-SCNC: 25 MMOL/L (ref 23–29)
CREAT SERPL-MCNC: 0.7 MG/DL (ref 0.5–1.4)
DIFFERENTIAL METHOD: ABNORMAL
EOSINOPHIL # BLD AUTO: 0 K/UL (ref 0–0.5)
EOSINOPHIL NFR BLD: 0 % (ref 0–8)
ERYTHROCYTE [DISTWIDTH] IN BLOOD BY AUTOMATED COUNT: 14.2 % (ref 11.5–14.5)
EST. GFR  (NO RACE VARIABLE): >60 ML/MIN/1.73 M^2
GLUCOSE SERPL-MCNC: 120 MG/DL (ref 70–110)
HCT VFR BLD AUTO: 37 % (ref 37–48.5)
HGB BLD-MCNC: 11.6 G/DL (ref 12–16)
IMM GRANULOCYTES # BLD AUTO: 0.07 K/UL (ref 0–0.04)
IMM GRANULOCYTES NFR BLD AUTO: 0.5 % (ref 0–0.5)
LYMPHOCYTES # BLD AUTO: 1.1 K/UL (ref 1–4.8)
LYMPHOCYTES NFR BLD: 7.9 % (ref 18–48)
MCH RBC QN AUTO: 28.3 PG (ref 27–31)
MCHC RBC AUTO-ENTMCNC: 31.4 G/DL (ref 32–36)
MCV RBC AUTO: 90 FL (ref 82–98)
MONOCYTES # BLD AUTO: 1.1 K/UL (ref 0.3–1)
MONOCYTES NFR BLD: 8.1 % (ref 4–15)
NEUTROPHILS # BLD AUTO: 11.5 K/UL (ref 1.8–7.7)
NEUTROPHILS NFR BLD: 83.3 % (ref 38–73)
NRBC BLD-RTO: 0 /100 WBC
PLATELET # BLD AUTO: 275 K/UL (ref 150–450)
PMV BLD AUTO: 10.8 FL (ref 9.2–12.9)
POCT GLUCOSE: 106 MG/DL (ref 70–110)
POCT GLUCOSE: 92 MG/DL (ref 70–110)
POCT GLUCOSE: 92 MG/DL (ref 70–110)
POTASSIUM SERPL-SCNC: 4.2 MMOL/L (ref 3.5–5.1)
PROT SERPL-MCNC: 6.4 G/DL (ref 6–8.4)
RBC # BLD AUTO: 4.1 M/UL (ref 4–5.4)
SODIUM SERPL-SCNC: 137 MMOL/L (ref 136–145)
WBC # BLD AUTO: 13.84 K/UL (ref 3.9–12.7)

## 2022-12-12 PROCEDURE — 63600175 PHARM REV CODE 636 W HCPCS: Performed by: SURGERY

## 2022-12-12 PROCEDURE — 99232 PR SUBSEQUENT HOSPITAL CARE,LEVL II: ICD-10-PCS | Mod: ,,, | Performed by: INTERNAL MEDICINE

## 2022-12-12 PROCEDURE — 25000003 PHARM REV CODE 250: Performed by: FAMILY MEDICINE

## 2022-12-12 PROCEDURE — 25000003 PHARM REV CODE 250: Performed by: NURSE PRACTITIONER

## 2022-12-12 PROCEDURE — 36415 COLL VENOUS BLD VENIPUNCTURE: CPT | Performed by: SURGERY

## 2022-12-12 PROCEDURE — 63600175 PHARM REV CODE 636 W HCPCS: Performed by: FAMILY MEDICINE

## 2022-12-12 PROCEDURE — 27000221 HC OXYGEN, UP TO 24 HOURS

## 2022-12-12 PROCEDURE — 99900035 HC TECH TIME PER 15 MIN (STAT)

## 2022-12-12 PROCEDURE — 85025 COMPLETE CBC W/AUTO DIFF WBC: CPT | Performed by: SURGERY

## 2022-12-12 PROCEDURE — 99232 SBSQ HOSP IP/OBS MODERATE 35: CPT | Mod: ,,, | Performed by: INTERNAL MEDICINE

## 2022-12-12 PROCEDURE — 25000003 PHARM REV CODE 250: Performed by: SURGERY

## 2022-12-12 PROCEDURE — 11000001 HC ACUTE MED/SURG PRIVATE ROOM

## 2022-12-12 PROCEDURE — 80053 COMPREHEN METABOLIC PANEL: CPT | Performed by: SURGERY

## 2022-12-12 PROCEDURE — 94761 N-INVAS EAR/PLS OXIMETRY MLT: CPT

## 2022-12-12 PROCEDURE — 94799 UNLISTED PULMONARY SVC/PX: CPT

## 2022-12-12 RX ORDER — HYDROMORPHONE HCL IN 0.9% NACL 6 MG/30 ML
PATIENT CONTROLLED ANALGESIA SYRINGE INTRAVENOUS CONTINUOUS
Status: DISCONTINUED | OUTPATIENT
Start: 2022-12-12 | End: 2022-12-13

## 2022-12-12 RX ADMIN — DIPHENHYDRAMINE HYDROCHLORIDE 25 MG: 25 CAPSULE ORAL at 07:12

## 2022-12-12 RX ADMIN — AMLODIPINE BESYLATE 5 MG: 5 TABLET ORAL at 08:12

## 2022-12-12 RX ADMIN — MUPIROCIN: 20 OINTMENT TOPICAL at 08:12

## 2022-12-12 RX ADMIN — ENOXAPARIN SODIUM 40 MG: 40 INJECTION SUBCUTANEOUS at 05:12

## 2022-12-12 RX ADMIN — HYDROCHLOROTHIAZIDE 12.5 MG: 12.5 TABLET ORAL at 08:12

## 2022-12-12 RX ADMIN — LOSARTAN POTASSIUM 100 MG: 50 TABLET, FILM COATED ORAL at 08:12

## 2022-12-12 RX ADMIN — DIPHENHYDRAMINE HYDROCHLORIDE 25 MG: 25 CAPSULE ORAL at 05:12

## 2022-12-12 RX ADMIN — Medication: at 08:12

## 2022-12-12 RX ADMIN — SODIUM CHLORIDE, POTASSIUM CHLORIDE, SODIUM LACTATE AND CALCIUM CHLORIDE: 600; 310; 30; 20 INJECTION, SOLUTION INTRAVENOUS at 05:12

## 2022-12-12 RX ADMIN — HYDROMORPHONE HYDROCHLORIDE: 2 INJECTION INTRAMUSCULAR; INTRAVENOUS; SUBCUTANEOUS at 03:12

## 2022-12-12 RX ADMIN — PANTOPRAZOLE SODIUM 40 MG: 40 TABLET, DELAYED RELEASE ORAL at 08:12

## 2022-12-12 RX ADMIN — SODIUM CHLORIDE, POTASSIUM CHLORIDE, SODIUM LACTATE AND CALCIUM CHLORIDE: 600; 310; 30; 20 INJECTION, SOLUTION INTRAVENOUS at 07:12

## 2022-12-12 RX ADMIN — ATORVASTATIN CALCIUM 20 MG: 10 TABLET, FILM COATED ORAL at 08:12

## 2022-12-12 NOTE — PROGRESS NOTES
[facility] Surg  Hematology/Oncology  Progress Note    Patient Name: Becky Steen  Admission Date: 12/10/2022  Hospital Length of Stay: 2 days  Code Status: Full Code     Subjective:     HPI:  62-year-old female history of abdominal pain over the last 2 weeks.  She states that his pain in her lower abdomen cramping in nature.  Patient that she did have a [facility]oscopy [facility] in distant past she is not sure when aware.  Patient presents with abdominal pain and bowel obstruction with multiple areas of air-fluid level with mass in right lower quadrant I was asked to see the patient for further evaluation ECOG status 2 NG tube in place      Interval History:  Patient much more comfortable this morning than prior to surgery.  Discussed results    Oncology Treatment Plan:   [No matching plan found]    Medications:  Continuous Infusions:   HYDROmorphone PCA in 0.9% NaCl 6 mg/30 mL (0.2 mg/mL)      lactated ringers 75 mL/hr at 12/12/22 0532     Scheduled Meds:   amLODIPine  5 mg Oral Daily    atorvastatin  20 mg Oral Daily    enoxaparin  40 mg Subcutaneous Daily    hydroCHLOROthiazide  12.5 mg Oral Daily    losartan  100 mg Oral Daily    mupirocin   Nasal BID    pantoprazole  40 mg Oral Daily     PRN Meds:acetaminophen, acetaminophen, albuterol-ipratropium, aluminum-magnesium hydroxide-simethicone, dextrose 10%, dextrose 10%, dextrose 10%, dextrose 10%, diphenhydrAMINE, glucagon (human recombinant), glucagon (human recombinant), glucose, glucose, insulin aspart U-100, melatonin, naloxone, naloxone, ondansetron, promethazine, sodium chloride 0.9%     Review of Systems   Constitutional:  Positive for activity change and fatigue. Negative for appetite change, chills, diaphoresis, fever and unexpected weight change.   HENT:  Negative for congestion, dental problem, drooling, ear discharge, ear pain, facial swelling, hearing loss, mouth sores, nosebleeds, postnasal drip, rhinorrhea, sinus pressure, sneezing,  sore throat, tinnitus, trouble swallowing and voice change.    Eyes:  Negative for photophobia, pain, discharge, redness, itching and visual disturbance.   Respiratory:  Negative for cough, choking, chest tightness, shortness of breath, wheezing and stridor.    Cardiovascular:  Negative for chest pain, palpitations and leg swelling.   Gastrointestinal:  Positive for abdominal pain. Negative for abdominal distention, anal bleeding, blood in stool, constipation, diarrhea, nausea, rectal pain and vomiting.        Discomfort from abdominal incision but much more comfortable than prior to admission to the hospital with bowel obstruction   Endocrine: Negative for cold intolerance, heat intolerance, polydipsia, polyphagia and polyuria.   Genitourinary:  Negative for decreased urine volume, difficulty urinating, dyspareunia, dysuria, enuresis, flank pain, frequency, genital sores, hematuria, menstrual problem, pelvic pain, urgency, vaginal bleeding, vaginal discharge and vaginal pain.   Musculoskeletal:  Negative for arthralgias, back pain, gait problem, joint swelling, myalgias, neck pain and neck stiffness.   Skin:  Negative for color change, pallor and rash.   Allergic/Immunologic: Negative for environmental allergies, food allergies and immunocompromised state.   Neurological:  Positive for weakness. Negative for dizziness, tremors, seizures, syncope, facial asymmetry, speech difficulty, light-headedness, numbness and headaches.   Hematological:  Negative for adenopathy. Does not bruise/bleed easily.   Psychiatric/Behavioral:  Positive for dysphoric mood. Negative for agitation, behavioral problems, confusion, decreased concentration, hallucinations, self-injury, sleep disturbance and suicidal ideas. The patient is nervous/anxious. The patient is not hyperactive.    Objective:     Vital Signs (Most Recent):  Temp: 97.8 °F (36.6 °C) (12/12/22 0741)  Pulse: 76 (12/12/22 0741)  Resp: 16 (12/12/22 0741)  BP: (!) 102/59  (12/12/22 0741)  SpO2: 95 % (12/12/22 0741)   Vital Signs (24h Range):  Temp:  [97.8 °F (36.6 °C)-98.2 °F (36.8 °C)] 97.8 °F (36.6 °C)  Pulse:  [] 76  Resp:  [16-25] 16  SpO2:  [90 %-98 %] 95 %  BP: ()/(55-72) 102/59     Weight: 104.7 kg (230 lb 13.2 oz)  Body mass index is 38.41 kg/m².  Body surface area is 2.19 meters squared.      Intake/Output Summary (Last 24 hours) at 12/12/2022 0743  Last data filed at 12/12/2022 0702  Gross per 24 hour   Intake 2417.12 ml   Output 1425 ml   Net 992.12 ml       Physical Exam  Vitals reviewed.   Constitutional:       General: She is not in acute distress.     Appearance: She is well-developed. She is obese. She is not diaphoretic.   HENT:      Head: Normocephalic and atraumatic.      Right Ear: External ear normal.      Left Ear: External ear normal.      Nose: Nose normal.      Right Sinus: No maxillary sinus tenderness or frontal sinus tenderness.      Left Sinus: No maxillary sinus tenderness or frontal sinus tenderness.      Mouth/Throat:      Pharynx: No oropharyngeal exudate.   Eyes:      General: Lids are normal. No scleral icterus.        Right eye: No discharge.         Left eye: No discharge.      Conjunctiva/sclera: Conjunctivae normal.      Right eye: Right conjunctiva is not injected. No hemorrhage.     Left eye: Left conjunctiva is not injected. No hemorrhage.     Pupils: Pupils are equal, round, and reactive to light.   Neck:      Thyroid: No thyromegaly.      Vascular: No JVD.      Trachea: No tracheal deviation.   Cardiovascular:      Rate and Rhythm: Normal rate.   Pulmonary:      Effort: Pulmonary effort is normal. No respiratory distress.      Breath sounds: No stridor.   Chest:      Chest wall: No tenderness.   Abdominal:      General: There is no distension.      Palpations: Abdomen is soft. There is no hepatomegaly, splenomegaly or mass.      Tenderness: There is no abdominal tenderness. There is no rebound.      Comments: Midline incision  noted   Musculoskeletal:         General: No tenderness. Normal range of motion.      Cervical back: Normal range of motion and neck supple.   Lymphadenopathy:      Cervical: No cervical adenopathy.      Upper Body:      Right upper body: No supraclavicular adenopathy.      Left upper body: No supraclavicular adenopathy.   Skin:     General: Skin is dry.      Findings: No erythema or rash.   Neurological:      Mental Status: She is alert and oriented to person, place, and time.      Cranial Nerves: No cranial nerve deficit.      Coordination: Coordination normal.   Psychiatric:         Behavior: Behavior normal.         Thought Content: Thought content normal.         Judgment: Judgment normal.       Significant Labs:   BMP:   Recent Labs   Lab 12/10/22  1209 12/11/22  0544 12/12/22  0543   * 95 120*    139 137   K 3.8 4.1 4.2    104 101   CO2 21* 26 25   BUN 13 12 9   CREATININE 0.7 0.7 0.7   CALCIUM 10.2 8.9 9.0   , CBC:   Recent Labs   Lab 12/10/22  1209 12/11/22  0544 12/12/22  0543   WBC 9.80 7.53 13.84*   HGB 13.6 12.0 11.6*   HCT 41.8 37.7 37.0    316 275   , CMP:   Recent Labs   Lab 12/10/22  1209 12/11/22  0544 12/12/22  0543    139 137   K 3.8 4.1 4.2    104 101   CO2 21* 26 25   * 95 120*   BUN 13 12 9   CREATININE 0.7 0.7 0.7   CALCIUM 10.2 8.9 9.0   PROT 7.9 6.3 6.4   ALBUMIN 4.2 3.4* 3.1*   BILITOT 1.7* 1.6* 1.3*   ALKPHOS 74 65 57   AST 21 17 20   ALT 18 12 14   ANIONGAP 14 9 11   , Coagulation: No results for input(s): PT, INR, APTT in the last 48 hours., Haptoglobin: No results for input(s): HAPTOGLOBIN in the last 48 hours., Immunology: No results for input(s): SPEP, SUZY, ANETA, FREELAMBDALI in the last 48 hours., LDH: No results for input(s): LDHCSF, BFSOURCE in the last 48 hours., LFTs:   Recent Labs   Lab 12/10/22  1209 12/11/22  0544 12/12/22  0543   ALT 18 12 14   AST 21 17 20   ALKPHOS 74 65 57   BILITOT 1.7* 1.6* 1.3*   PROT 7.9 6.3 6.4   ALBUMIN  "4.2 3.4* 3.1*   , Reticulocytes: No results for input(s): RETIC in the last 48 hours., Tumor Markers:   Recent Labs   Lab 12/10/22  2218   CEA <1.7   , Uric Acid No results for input(s): URICACID in the last 48 hours., and Urine Studies:   Recent Labs   Lab 12/10/22  1200   COLORU Yellow   APPEARANCEUA Clear   PHUR 6.0   SPECGRAV 1.020   PROTEINUA Trace*   GLUCUA Negative   KETONESU Negative   BILIRUBINUA Negative   OCCULTUA Negative   NITRITE Negative   UROBILINOGEN Negative   LEUKOCYTESUR Trace*   RBCUA 3   WBCUA 6*   BACTERIA Few*   SQUAMEPITHEL 10       Diagnostic Results:  I have reviewed all pertinent imaging results/findings within the past 24 hours.    Assessment/Plan:     * Bowel obstruction  Bowel obstruction secondary to abdominal mass    Right lower quadrant abdominal mass  12/11/2022 Reviewed CT personally with patient demonstrating a mass in the right lower quadrant.  Highly suspect shows of malignancy.  At this time my recommendations are to proceed with surgical resection and relieve obstruction.  I have talked to the patient about the possible need for colostomy if unable to do end and anastomosis.  Will defer obviously to surgery in this regard.  Await histological confirmation of mass to see whether not malignant and then to make treatment recommendations.  Reviewed images personally with patient nurse present in room 12/11/2022 12/12/2022.  Reviewed results from surgical procedure.  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  Will await final pathological interpretation.  Discussed implications with patient briefly.  And will be back to discuss with her once final pathology returns       Type 2 diabetes mellitus, with long-term current " use of insulin  Cared for by primary care team    Primary hypertension  Cared for by primary care team    Generalized abdominal pain  Secondary to bowel obstruction and abdominal mass        Thank you for your consult. I will follow-up with patient. Please contact us if you have any additional questions.     Kenton Christianson MD  Hematology/Oncology  [facility]

## 2022-12-12 NOTE — PROGRESS NOTES
[facility] Surg  General Surgery  Progress Note    Subjective:     History of Present Illness:  56 y/o female referred for right lower quadrant abdominal mass causing bowel obstruction. Patient reports constipation and decreased appetite. Denies any weight loss. Recently began having abdominal pain and bloating. CT in ER showing intraabdominal mass in right lower quadrant causing bowel obstruction.  Only previous abdominal/pelvic surgery was a tubal ligation.      Post-Op Info:  Procedure(s) (LRB):  LAPAROTOMY, EXPLORATORY (N/A)  CYSTOSCOPY, WITH URETERAL STENT INSERTION (Right)  BLOCK, TRANSVERSUS ABDOMINIS PLANE (N/A)   1 Day Post-Op     Interval History: NGT in place with improve appearance in output. Pain controlled. No nausea and vomiting. Minimal ambulation so far. Cordova in place.     Medications:  Continuous Infusions:   HYDROmorphone PCA in 0.9% NaCl 6 mg/30 mL (0.2 mg/mL)      lactated ringers 75 mL/hr at 12/12/22 0532     Scheduled Meds:   amLODIPine  5 mg Oral Daily    atorvastatin  20 mg Oral Daily    enoxaparin  40 mg Subcutaneous Daily    hydroCHLOROthiazide  12.5 mg Oral Daily    losartan  100 mg Oral Daily    mupirocin   Nasal BID    pantoprazole  40 mg Oral Daily     PRN Meds:acetaminophen, acetaminophen, albuterol-ipratropium, aluminum-magnesium hydroxide-simethicone, dextrose 10%, dextrose 10%, dextrose 10%, dextrose 10%, diphenhydrAMINE, glucagon (human recombinant), glucagon (human recombinant), glucose, glucose, insulin aspart U-100, melatonin, naloxone, naloxone, ondansetron, promethazine, sodium chloride 0.9%     Review of patient's allergies indicates:  No Known Allergies  Objective:     Vital Signs (Most Recent):  Temp: 97.8 °F (36.6 °C) (12/12/22 0741)  Pulse: 75 (12/12/22 0812)  Resp: 20 (12/12/22 0812)  BP: (!) 102/59 (12/12/22 0851)  SpO2: 95 % (12/12/22 0812)   Vital Signs (24h Range):  Temp:  [97.8 °F (36.6 °C)-98.2 °F (36.8 °C)] 97.8 °F (36.6 °C)  Pulse:  []  75  Resp:  [16-25] 20  SpO2:  [90 %-98 %] 95 %  BP: ()/(55-72) 102/59     Weight: 104.7 kg (230 lb 13.2 oz)  Body mass index is 38.41 kg/m².    Intake/Output - Last 3 Shifts         12/10 0700 12/11 0659 12/11 0700  12/12 0659 12/12 0700 12/13 0659    I.V. (mL/kg) 906.9 (9) 1363.1 (13) 4 (0)    IV Piggyback 1050 1050     Total Intake(mL/kg) 1956.9 (19.5) 2413.1 (23) 4 (0)    Urine (mL/kg/hr)  775 (0.3) 400 (1.4)    Drains  650     Total Output  1425 400    Net +1956.9 +988.1 -396           Urine Occurrence 3 x              Physical Exam  Vitals and nursing note reviewed.   Constitutional:       General: She is not in acute distress.     Appearance: She is well-developed.   HENT:      Head: Normocephalic and atraumatic.      Right Ear: External ear normal.      Left Ear: External ear normal.   Eyes:      Extraocular Movements: Extraocular movements intact.      Conjunctiva/sclera: Conjunctivae normal.   Cardiovascular:      Rate and Rhythm: Normal rate.   Pulmonary:      Effort: Pulmonary effort is normal. No respiratory distress.   Abdominal:      Comments: Abdomen with some distention and expected post-operative TTP. Surgical dressing in place, clean and dry. NGT in place.    Musculoskeletal:      Cervical back: Neck supple.   Skin:     General: Skin is warm and dry.   Neurological:      Mental Status: She is alert and oriented to person, place, and time.   Psychiatric:         Behavior: Behavior normal.       Significant Labs:  I have reviewed all pertinent lab results within the past 24 hours.  CBC:   Recent Labs   Lab 12/12/22  0543   WBC 13.84*   RBC 4.10   HGB 11.6*   HCT 37.0      MCV 90   MCH 28.3   MCHC 31.4*     CMP:   Recent Labs   Lab 12/12/22  0543   *   CALCIUM 9.0   ALBUMIN 3.1*   PROT 6.4      K 4.2   CO2 25      BUN 9   CREATININE 0.7   ALKPHOS 57   ALT 14   AST 20   BILITOT 1.3*       Significant Diagnostics:  I have reviewed all pertinent imaging results/findings  within the past 24 hours.  No new pertinent imaging    Assessment/Plan:     * Bowel obstruction  S/p ex lap with ileocolonic diversion and biopsy of intra-abdominal mass    - Discontinue NGT and start clear liquids, monitor for tolerance  - Await full return of bowel function  - Encouraged OOB, ambulation  - DVT and GI prophylaxis  - Incentive spirometry    Class 2 severe obesity due to excess calories with serious comorbidity and body mass index (BMI) of 36.0 to 36.9 in adult  Dietary modifications    Type 2 diabetes mellitus, with long-term current use of insulin  Medical mgmt    Primary hypertension  Medical mgmt        Yaneth Lopez PA-C  General Surgery  [facility]

## 2022-12-12 NOTE — ASSESSMENT & PLAN NOTE
"12/11/2022 Reviewed CT personally with patient demonstrating a mass in the right lower quadrant.  Highly suspect shows of malignancy.  At this time my recommendations are to proceed with surgical resection and relieve obstruction.  I have talked to the patient about the possible need for colostomy if unable to do end and anastomosis.  Will defer obviously to surgery in this regard.  Await histological confirmation of mass to see whether not malignant and then to make treatment recommendations.  Reviewed images personally with patient nurse present in room 12/11/2022 12/12/2022.  Reviewed results from surgical procedure.  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  Will await final pathological interpretation.  Discussed implications with patient briefly.  And will be back to discuss with her once final pathology returns     "

## 2022-12-12 NOTE — SUBJECTIVE & OBJECTIVE
Interval History: NGT in place with improve appearance in output. Pain controlled. No nausea and vomiting. Minimal ambulation so far. Cordova in place.     Medications:  Continuous Infusions:   HYDROmorphone PCA in 0.9% NaCl 6 mg/30 mL (0.2 mg/mL)      lactated ringers 75 mL/hr at 12/12/22 0532     Scheduled Meds:   amLODIPine  5 mg Oral Daily    atorvastatin  20 mg Oral Daily    enoxaparin  40 mg Subcutaneous Daily    hydroCHLOROthiazide  12.5 mg Oral Daily    losartan  100 mg Oral Daily    mupirocin   Nasal BID    pantoprazole  40 mg Oral Daily     PRN Meds:acetaminophen, acetaminophen, albuterol-ipratropium, aluminum-magnesium hydroxide-simethicone, dextrose 10%, dextrose 10%, dextrose 10%, dextrose 10%, diphenhydrAMINE, glucagon (human recombinant), glucagon (human recombinant), glucose, glucose, insulin aspart U-100, melatonin, naloxone, naloxone, ondansetron, promethazine, sodium chloride 0.9%     Review of patient's allergies indicates:  No Known Allergies  Objective:     Vital Signs (Most Recent):  Temp: 97.8 °F (36.6 °C) (12/12/22 0741)  Pulse: 75 (12/12/22 0812)  Resp: 20 (12/12/22 0812)  BP: (!) 102/59 (12/12/22 0851)  SpO2: 95 % (12/12/22 0812)   Vital Signs (24h Range):  Temp:  [97.8 °F (36.6 °C)-98.2 °F (36.8 °C)] 97.8 °F (36.6 °C)  Pulse:  [] 75  Resp:  [16-25] 20  SpO2:  [90 %-98 %] 95 %  BP: ()/(55-72) 102/59     Weight: 104.7 kg (230 lb 13.2 oz)  Body mass index is 38.41 kg/m².    Intake/Output - Last 3 Shifts         12/10 0700  12/11 0659 12/11 0700  12/12 0659 12/12 0700 12/13 0659    I.V. (mL/kg) 906.9 (9) 1363.1 (13) 4 (0)    IV Piggyback 1050 1050     Total Intake(mL/kg) 1956.9 (19.5) 2413.1 (23) 4 (0)    Urine (mL/kg/hr)  775 (0.3) 400 (1.4)    Drains  650     Total Output  1425 400    Net +1956.9 +988.1 -396           Urine Occurrence 3 x              Physical Exam  Vitals and nursing note reviewed.   Constitutional:       General: She is not in acute distress.     Appearance:  She is well-developed.   HENT:      Head: Normocephalic and atraumatic.      Right Ear: External ear normal.      Left Ear: External ear normal.   Eyes:      Extraocular Movements: Extraocular movements intact.      Conjunctiva/sclera: Conjunctivae normal.   Cardiovascular:      Rate and Rhythm: Normal rate.   Pulmonary:      Effort: Pulmonary effort is normal. No respiratory distress.   Abdominal:      Comments: Abdomen with some distention and expected post-operative TTP. Surgical dressing in place, clean and dry. NGT in place.    Musculoskeletal:      Cervical back: Neck supple.   Skin:     General: Skin is warm and dry.   Neurological:      Mental Status: She is alert and oriented to person, place, and time.   Psychiatric:         Behavior: Behavior normal.       Significant Labs:  I have reviewed all pertinent lab results within the past 24 hours.  CBC:   Recent Labs   Lab 12/12/22  0543   WBC 13.84*   RBC 4.10   HGB 11.6*   HCT 37.0      MCV 90   MCH 28.3   MCHC 31.4*     CMP:   Recent Labs   Lab 12/12/22  0543   *   CALCIUM 9.0   ALBUMIN 3.1*   PROT 6.4      K 4.2   CO2 25      BUN 9   CREATININE 0.7   ALKPHOS 57   ALT 14   AST 20   BILITOT 1.3*       Significant Diagnostics:  I have reviewed all pertinent imaging results/findings within the past 24 hours.  No new pertinent imaging

## 2022-12-12 NOTE — ASSESSMENT & PLAN NOTE
POD1 S/p ex lap with ileocolonic diversion and biopsy of intra-abdominal mass  F/u surgery recs  Remove NGT  Clear liquids, advance as tolerated  Pain control

## 2022-12-12 NOTE — PROGRESS NOTES
[facility]  Progress Note    Patient Name: Becky Steen  MRN: [id number]  Patient Class: IP- Inpatient   Admission Date: 12/10/2022  Length of Stay: 2 days  Attending Physician: Jamie Horvath MD  Primary Care Provider: SANDEEP Perdue        Subjective:     Principal Problem:Bowel obstruction        HPI:  Becky Steen is a 57 y.o. female with a PMH  has a past medical history of Asthma, Diabetes mellitus, GERD (gastroesophageal reflux disease), HLD (hyperlipidemia), and Hypertension. who presented as a transfer from [facility] for higher level of care after patient presented with complaints of generalized abdominal pain with associated nausea and vomiting and was found to have evidence of a soft tissue mass arising from the cecum/distal small bowel causing bowel obstruction noted on CT abdomen/pelvis. Patient reported acute onset and progressively worsening generalized abdominal pain which began approximately 2 weeks prior to admission. She reported pain is intermittent in frequency and described as a sharp/labored like pains throughout her entire abdomen, currently rated 0/10 in severity but worsens to 10/10 in severity with no known alleviating or aggravating factors noted.  Associated symptoms included nausea, vomiting, constipation, and decreased oral intake but denied endorsing any fever, chills, sweats, chest pain, shortness a breath, dysuria, hematuria, hematochezia, melena, or neurological deficits.  Patient reported having small bowel movement yesterday with intermittent bowel movements throughout the week reported loose/watery nature.  Patient reported last colonoscopy was within 10 years with [facility] and was told to come back 10 years later for repeat.  Patient follows Dr. Alcala at [facility] for OBGYN with last reported mg obtain last year and Pap smear few months ago prior to admission and reported both were normal.  Patient reports history of skin  cancer in her father but is unaware of any other known family history of cancers.  Prior to onset of symptoms, patient reported being in her usual state of health no other concerns or complaints.  All other review of systems negative except as noted above.  Patient admitted to Hospital Medicine for continued medical management of bowel obstruction and further cancer workup. General Surgery aware of patient and awaiting further evaluation in the morning.     PCP: Kerry Aguilar        Overview/Hospital Course:  Admitted for right lower quadrant abdominal mass causing bowel obstruction. POD1 S/p ex lap with ileocolonic diversion and biopsy of intra-abdominal mass. NGT removed per surgery and started on clear liquid diet      Review of Systems   Constitutional:  Negative for activity change, appetite change, fatigue and fever.   Respiratory:  Negative for shortness of breath.    Cardiovascular:  Negative for chest pain.   Gastrointestinal:  Positive for abdominal pain, diarrhea and nausea.   Neurological:  Negative for weakness.   Psychiatric/Behavioral:  Negative for agitation, behavioral problems, confusion, decreased concentration and dysphoric mood. The patient is not nervous/anxious.    Objective:     Vital Signs (Most Recent):  Temp: 97.8 °F (36.6 °C) (12/12/22 0741)  Pulse: 75 (12/12/22 0812)  Resp: 20 (12/12/22 0812)  BP: (!) 102/59 (12/12/22 0851)  SpO2: 95 % (12/12/22 0812) Vital Signs (24h Range):  Temp:  [97.8 °F (36.6 °C)-98.2 °F (36.8 °C)] 97.8 °F (36.6 °C)  Pulse:  [] 75  Resp:  [16-25] 20  SpO2:  [90 %-98 %] 95 %  BP: ()/(55-72) 102/59     Weight: 104.7 kg (230 lb 13.2 oz)  Body mass index is 38.41 kg/m².    Intake/Output Summary (Last 24 hours) at 12/12/2022 1045  Last data filed at 12/12/2022 0800  Gross per 24 hour   Intake 2417.12 ml   Output 1825 ml   Net 592.12 ml        Physical Exam  Vitals and nursing note reviewed.   Constitutional:       General: She is not in acute distress.      Appearance: She is obese. She is ill-appearing. She is not toxic-appearing.   HENT:      Head: Normocephalic and atraumatic.     Cardiovascular:      Rate and Rhythm: Normal rate.   Pulmonary:      Effort: Pulmonary effort is normal. No respiratory distress.   Abdominal:      General: Bowel sounds are normal.      Palpations: Abdomen is soft.      Tenderness: There is no abdominal tenderness.   Musculoskeletal:      Right lower leg: No edema.      Left lower leg: No edema.   Skin:     General: Skin is warm.   Neurological:      Mental Status: She is alert and oriented to person, place, and time.   Psychiatric:         Mood and Affect: Mood is not anxious.         Speech: Speech normal.         Behavior: Behavior normal. Behavior is cooperative.       Significant Labs: All pertinent labs within the past 24 hours have been reviewed.  CBC:   Recent Labs   Lab 12/10/22  1209 12/11/22  0544 12/12/22  0543   WBC 9.80 7.53 13.84*   HGB 13.6 12.0 11.6*   HCT 41.8 37.7 37.0    316 275       CMP:   Recent Labs   Lab 12/10/22  1209 12/11/22  0544 12/12/22  0543    139 137   K 3.8 4.1 4.2    104 101   CO2 21* 26 25   * 95 120*   BUN 13 12 9   CREATININE 0.7 0.7 0.7   CALCIUM 10.2 8.9 9.0   PROT 7.9 6.3 6.4   ALBUMIN 4.2 3.4* 3.1*   BILITOT 1.7* 1.6* 1.3*   ALKPHOS 74 65 57   AST 21 17 20   ALT 18 12 14   ANIONGAP 14 9 11         Significant Imaging: I have reviewed all pertinent imaging results/findings within the past 24 hours.  CT: I have reviewed all pertinent results/findings within the past 24 hours and my personal findings are:  abdomen-soft tissue mass 11cm, peritoneal carinomatosis  Kub-satisfactory placement of ngt      Assessment/Plan:      * Bowel obstruction  POD1 S/p ex lap with ileocolonic diversion and biopsy of intra-abdominal mass  F/u surgery recs  Remove NGT  Clear liquids, advance as tolerated  Pain control      Right lower quadrant abdominal mass  Awaiting surgery for possible  resection  Npo  ngt     Nausea and vomiting  ngt   Surgery following      Class 2 severe obesity due to excess calories with serious comorbidity and body mass index (BMI) of 36.0 to 36.9 in adult  Body mass index is 36.96 kg/m². Elevation likely secondary to increased calorie intake and sedentary lifestyle. Patient educated on morbidity and mortality in regards to elevated BMI and stressed importance of diet and exercise.   Plan:  -low fat/low calorie diet       Asthma  Patient with history of Asthma currently on inhalers but not home oxygen. Not presently in acute exacerbation and is without signs/symptoms of distress. Patient currently saturating  99% on 2 L/min. CXR revealed satisfactory nasogastric tube with clear lungs and normal appearance of pulmonary vasculature without evidence of pleural effusion or pneumothorax.   Plan:  -Continue home medications, titrate as needed  -Titrate oxygen requirements as needed  -Incentive spirometry   -Monitor pulse oximetry  -Kingsley prn          Type 2 diabetes mellitus, with long-term current use of insulin  Patient's FSGs are controlled on current medication regimen.  Last A1c reviewed-   Lab Results   Component Value Date    HGBA1C 5.9 (H) 12/11/2022     Most recent fingerstick glucose reviewed-   Recent Labs   Lab 12/11/22  0556   POCTGLUCOSE 87     Current correctional scale  low  titrate anti-hyperglycemic dose as follows-   Antihyperglycemics (From admission, onward)    Start     Stop Route Frequency Ordered    12/11/22 0321  insulin aspart U-100 pen 1-10 Units         -- SubQ Every 6 hours PRN 12/11/22 0222      Plan:  -SSI  -Continue home insulin at decreased dose and titrate as needed  -Hold oral hypoglycemics while patient is in the hospital  -Hypoglycemic protocol     Gastroesophageal reflux disease without esophagitis  Chronic. Stable. Currently asymptomatic. Home medications include PPI/Antacids as needed.  Plan:  -Continue PPI/Antacids as needed          Primary hypertension  Currently normotensive.  Plan:  -Optimize pain control   -Continue home medications, titrate as needed   -Monitor BP  -Low salt/cardiac diet when not NPO  -IV hydralazine prn for SBP>160 or DBP>90       Continue current regimen and adjust accordingly        Generalized abdominal pain  Patient presented with worsening abdominal pain with associated nausea, vomiting, decreased p.o. intake, and constipation x2 weeks' duration and was found to have evidence of bowel obstruction associated with soft tissue mass.  CT abdomen/pelvis revealed an 11 cm soft tissue mass either arising from the cecum or distal small bowel consistent with malignancy causing a high-grade distal mechanical small bowel obstruction in addition to presence of peritoneal carcinomatosis.  Patient remains afebrile without leukocytosis and remains hemodynamically stable.  CMP, CBC, LFTs, and lipase within normal limits.  UA negative for UTI.  Patient status post NG tube insertion and currently on suction.  General surgery consulted and awaiting further evaluation/recommendations.  CEA and  ordered and pending prior to transfer.  Oncology consulted for malignant workup and to establish care.    Plan:  -NPO  -Continue current pain regimen, titrate as needed  -Bowel regimen  -Bedrest  -Continue NGT decompression  -IVFs prn  -f/u CEA and   -Antiemetics prn  -Tylenol as needed for fever   -f/u general surgery and oncology  -PT/OT     12/11  cea within normal limits  ca125 mildly elevated  Awaiting surgery today  Continue ngt            VTE Risk Mitigation (From admission, onward)         Ordered     enoxaparin injection 40 mg  Daily         12/10/22 2225     IP VTE HIGH RISK PATIENT  Once         12/10/22 2225     Place sequential compression device  Until discontinued         12/10/22 2225                Discharge Planning   FABIANA:      Code Status: Full Code   Is the patient medically ready for discharge?:     Reason  for patient still in hospital (select all that apply): Patient trending condition, Treatment and Consult recommendations  Discharge Plan A: Home                  Jamie Horvath MD  Department of Hospital Medicine   '[place] [facility]

## 2022-12-12 NOTE — SUBJECTIVE & OBJECTIVE
Review of Systems   Constitutional:  Negative for activity change, appetite change, fatigue and fever.   Respiratory:  Negative for shortness of breath.    Cardiovascular:  Negative for chest pain.   Gastrointestinal:  Positive for abdominal pain, diarrhea and nausea.   Neurological:  Negative for weakness.   Psychiatric/Behavioral:  Negative for agitation, behavioral problems, confusion, decreased concentration and dysphoric mood. The patient is not nervous/anxious.    Objective:     Vital Signs (Most Recent):  Temp: 97.8 °F (36.6 °C) (12/12/22 0741)  Pulse: 75 (12/12/22 0812)  Resp: 20 (12/12/22 0812)  BP: (!) 102/59 (12/12/22 0851)  SpO2: 95 % (12/12/22 0812) Vital Signs (24h Range):  Temp:  [97.8 °F (36.6 °C)-98.2 °F (36.8 °C)] 97.8 °F (36.6 °C)  Pulse:  [] 75  Resp:  [16-25] 20  SpO2:  [90 %-98 %] 95 %  BP: ()/(55-72) 102/59     Weight: 104.7 kg (230 lb 13.2 oz)  Body mass index is 38.41 kg/m².    Intake/Output Summary (Last 24 hours) at 12/12/2022 1045  Last data filed at 12/12/2022 0800  Gross per 24 hour   Intake 2417.12 ml   Output 1825 ml   Net 592.12 ml        Physical Exam  Vitals and nursing note reviewed.   Constitutional:       General: She is not in acute distress.     Appearance: She is obese. She is ill-appearing. She is not toxic-appearing.   HENT:      Head: Normocephalic and atraumatic.     Cardiovascular:      Rate and Rhythm: Normal rate.   Pulmonary:      Effort: Pulmonary effort is normal. No respiratory distress.   Abdominal:      General: Bowel sounds are normal.      Palpations: Abdomen is soft.      Tenderness: There is no abdominal tenderness.   Musculoskeletal:      Right lower leg: No edema.      Left lower leg: No edema.   Skin:     General: Skin is warm.   Neurological:      Mental Status: She is alert and oriented to person, place, and time.   Psychiatric:         Mood and Affect: Mood is not anxious.         Speech: Speech normal.         Behavior: Behavior normal.  Behavior is cooperative.       Significant Labs: All pertinent labs within the past 24 hours have been reviewed.  CBC:   Recent Labs   Lab 12/10/22  1209 12/11/22  0544 12/12/22  0543   WBC 9.80 7.53 13.84*   HGB 13.6 12.0 11.6*   HCT 41.8 37.7 37.0    316 275       CMP:   Recent Labs   Lab 12/10/22  1209 12/11/22  0544 12/12/22  0543    139 137   K 3.8 4.1 4.2    104 101   CO2 21* 26 25   * 95 120*   BUN 13 12 9   CREATININE 0.7 0.7 0.7   CALCIUM 10.2 8.9 9.0   PROT 7.9 6.3 6.4   ALBUMIN 4.2 3.4* 3.1*   BILITOT 1.7* 1.6* 1.3*   ALKPHOS 74 65 57   AST 21 17 20   ALT 18 12 14   ANIONGAP 14 9 11         Significant Imaging: I have reviewed all pertinent imaging results/findings within the past 24 hours.  CT: I have reviewed all pertinent results/findings within the past 24 hours and my personal findings are:  abdomen-soft tissue mass 11cm, peritoneal carinomatosis  Kub-satisfactory placement of ngt

## 2022-12-12 NOTE — PLAN OF CARE
Pt remains free from falls/injuries this shift. Safety precautions maintained. Pain managed with pain medication. Patient ambulating to bathroom, NG tube removed, patient tolerating clear liquids. No s/s of acute distress noted. Will continue to monitor. Chart check completed.

## 2022-12-12 NOTE — ANESTHESIA POSTPROCEDURE EVALUATION
Anesthesia Post Evaluation    Patient: Becky Steen    Procedure(s) Performed: Procedure(s) (LRB):  LAPAROTOMY, EXPLORATORY (N/A)  CYSTOSCOPY, WITH URETERAL STENT INSERTION (Right)  BLOCK, TRANSVERSUS ABDOMINIS PLANE (N/A)    Final Anesthesia Type: general      Patient location during evaluation: PACU  Patient participation: Yes- Able to Participate  Level of consciousness: awake and alert and oriented  Post-procedure vital signs: reviewed and stable  Pain management: adequate  Airway patency: patent    PONV status at discharge: No PONV  Anesthetic complications: no      Cardiovascular status: blood pressure returned to baseline, stable and hemodynamically stable  Respiratory status: unassisted  Hydration status: euvolemic  Follow-up not needed.          Vitals Value Taken Time   /59 12/12/22 0851   Temp 36.6 °C (97.8 °F) 12/12/22 0741   Pulse 75 12/12/22 0812   Resp 20 12/12/22 0812   SpO2 95 % 12/12/22 0812         Event Time   Out of Recovery 12/11/2022 15:15:13         Pain/Beti Score: Pain Rating Prior to Med Admin: 10 (12/12/2022  7:03 AM)  Pain Rating Post Med Admin: 7 (12/11/2022  3:20 PM)  Beti Score: 8 (12/11/2022  3:00 PM)

## 2022-12-12 NOTE — SUBJECTIVE & OBJECTIVE
Interval History:  Patient much more comfortable this morning than prior to surgery.  Discussed results    Oncology Treatment Plan:   [No matching plan found]    Medications:  Continuous Infusions:   HYDROmorphone PCA in 0.9% NaCl 6 mg/30 mL (0.2 mg/mL)      lactated ringers 75 mL/hr at 12/12/22 0532     Scheduled Meds:   amLODIPine  5 mg Oral Daily    atorvastatin  20 mg Oral Daily    enoxaparin  40 mg Subcutaneous Daily    hydroCHLOROthiazide  12.5 mg Oral Daily    losartan  100 mg Oral Daily    mupirocin   Nasal BID    pantoprazole  40 mg Oral Daily     PRN Meds:acetaminophen, acetaminophen, albuterol-ipratropium, aluminum-magnesium hydroxide-simethicone, dextrose 10%, dextrose 10%, dextrose 10%, dextrose 10%, diphenhydrAMINE, glucagon (human recombinant), glucagon (human recombinant), glucose, glucose, insulin aspart U-100, melatonin, naloxone, naloxone, ondansetron, promethazine, sodium chloride 0.9%     Review of Systems   Constitutional:  Positive for activity change and fatigue. Negative for appetite change, chills, diaphoresis, fever and unexpected weight change.   HENT:  Negative for congestion, dental problem, drooling, ear discharge, ear pain, facial swelling, hearing loss, mouth sores, nosebleeds, postnasal drip, rhinorrhea, sinus pressure, sneezing, sore throat, tinnitus, trouble swallowing and voice change.    Eyes:  Negative for photophobia, pain, discharge, redness, itching and visual disturbance.   Respiratory:  Negative for cough, choking, chest tightness, shortness of breath, wheezing and stridor.    Cardiovascular:  Negative for chest pain, palpitations and leg swelling.   Gastrointestinal:  Positive for abdominal pain. Negative for abdominal distention, anal bleeding, blood in stool, constipation, diarrhea, nausea, rectal pain and vomiting.        Discomfort from abdominal incision but much more comfortable than prior to admission to the hospital with bowel obstruction   Endocrine: Negative for  cold intolerance, heat intolerance, polydipsia, polyphagia and polyuria.   Genitourinary:  Negative for decreased urine volume, difficulty urinating, dyspareunia, dysuria, enuresis, flank pain, frequency, genital sores, hematuria, menstrual problem, pelvic pain, urgency, vaginal bleeding, vaginal discharge and vaginal pain.   Musculoskeletal:  Negative for arthralgias, back pain, gait problem, joint swelling, myalgias, neck pain and neck stiffness.   Skin:  Negative for color change, pallor and rash.   Allergic/Immunologic: Negative for environmental allergies, food allergies and immunocompromised state.   Neurological:  Positive for weakness. Negative for dizziness, tremors, seizures, syncope, facial asymmetry, speech difficulty, light-headedness, numbness and headaches.   Hematological:  Negative for adenopathy. Does not bruise/bleed easily.   Psychiatric/Behavioral:  Positive for dysphoric mood. Negative for agitation, behavioral problems, confusion, decreased concentration, hallucinations, self-injury, sleep disturbance and suicidal ideas. The patient is nervous/anxious. The patient is not hyperactive.    Objective:     Vital Signs (Most Recent):  Temp: 97.8 °F (36.6 °C) (12/12/22 0741)  Pulse: 76 (12/12/22 0741)  Resp: 16 (12/12/22 0741)  BP: (!) 102/59 (12/12/22 0741)  SpO2: 95 % (12/12/22 0741)   Vital Signs (24h Range):  Temp:  [97.8 °F (36.6 °C)-98.2 °F (36.8 °C)] 97.8 °F (36.6 °C)  Pulse:  [] 76  Resp:  [16-25] 16  SpO2:  [90 %-98 %] 95 %  BP: ()/(55-72) 102/59     Weight: 104.7 kg (230 lb 13.2 oz)  Body mass index is 38.41 kg/m².  Body surface area is 2.19 meters squared.      Intake/Output Summary (Last 24 hours) at 12/12/2022 0743  Last data filed at 12/12/2022 0702  Gross per 24 hour   Intake 2417.12 ml   Output 1425 ml   Net 992.12 ml       Physical Exam  Vitals reviewed.   Constitutional:       General: She is not in acute distress.     Appearance: She is well-developed. She is obese. She  is not diaphoretic.   HENT:      Head: Normocephalic and atraumatic.      Right Ear: External ear normal.      Left Ear: External ear normal.      Nose: Nose normal.      Right Sinus: No maxillary sinus tenderness or frontal sinus tenderness.      Left Sinus: No maxillary sinus tenderness or frontal sinus tenderness.      Mouth/Throat:      Pharynx: No oropharyngeal exudate.   Eyes:      General: Lids are normal. No scleral icterus.        Right eye: No discharge.         Left eye: No discharge.      Conjunctiva/sclera: Conjunctivae normal.      Right eye: Right conjunctiva is not injected. No hemorrhage.     Left eye: Left conjunctiva is not injected. No hemorrhage.     Pupils: Pupils are equal, round, and reactive to light.   Neck:      Thyroid: No thyromegaly.      Vascular: No JVD.      Trachea: No tracheal deviation.   Cardiovascular:      Rate and Rhythm: Normal rate.   Pulmonary:      Effort: Pulmonary effort is normal. No respiratory distress.      Breath sounds: No stridor.   Chest:      Chest wall: No tenderness.   Abdominal:      General: There is no distension.      Palpations: Abdomen is soft. There is no hepatomegaly, splenomegaly or mass.      Tenderness: There is no abdominal tenderness. There is no rebound.      Comments: Midline incision noted   Musculoskeletal:         General: No tenderness. Normal range of motion.      Cervical back: Normal range of motion and neck supple.   Lymphadenopathy:      Cervical: No cervical adenopathy.      Upper Body:      Right upper body: No supraclavicular adenopathy.      Left upper body: No supraclavicular adenopathy.   Skin:     General: Skin is dry.      Findings: No erythema or rash.   Neurological:      Mental Status: She is alert and oriented to person, place, and time.      Cranial Nerves: No cranial nerve deficit.      Coordination: Coordination normal.   Psychiatric:         Behavior: Behavior normal.         Thought Content: Thought content normal.          Judgment: Judgment normal.       Significant Labs:   BMP:   Recent Labs   Lab 12/10/22  1209 12/11/22  0544 12/12/22  0543   * 95 120*    139 137   K 3.8 4.1 4.2    104 101   CO2 21* 26 25   BUN 13 12 9   CREATININE 0.7 0.7 0.7   CALCIUM 10.2 8.9 9.0   , CBC:   Recent Labs   Lab 12/10/22  1209 12/11/22  0544 12/12/22  0543   WBC 9.80 7.53 13.84*   HGB 13.6 12.0 11.6*   HCT 41.8 37.7 37.0    316 275   , CMP:   Recent Labs   Lab 12/10/22  1209 12/11/22  0544 12/12/22  0543    139 137   K 3.8 4.1 4.2    104 101   CO2 21* 26 25   * 95 120*   BUN 13 12 9   CREATININE 0.7 0.7 0.7   CALCIUM 10.2 8.9 9.0   PROT 7.9 6.3 6.4   ALBUMIN 4.2 3.4* 3.1*   BILITOT 1.7* 1.6* 1.3*   ALKPHOS 74 65 57   AST 21 17 20   ALT 18 12 14   ANIONGAP 14 9 11   , Coagulation: No results for input(s): PT, INR, APTT in the last 48 hours., Haptoglobin: No results for input(s): HAPTOGLOBIN in the last 48 hours., Immunology: No results for input(s): SPEP, SUZY, ANETA, FREELAMBDALI in the last 48 hours., LDH: No results for input(s): LDHCSF, BFSOURCE in the last 48 hours., LFTs:   Recent Labs   Lab 12/10/22  1209 12/11/22  0544 12/12/22  0543   ALT 18 12 14   AST 21 17 20   ALKPHOS 74 65 57   BILITOT 1.7* 1.6* 1.3*   PROT 7.9 6.3 6.4   ALBUMIN 4.2 3.4* 3.1*   , Reticulocytes: No results for input(s): RETIC in the last 48 hours., Tumor Markers:   Recent Labs   Lab 12/10/22  2218   CEA <1.7   , Uric Acid No results for input(s): URICACID in the last 48 hours., and Urine Studies:   Recent Labs   Lab 12/10/22  1200   COLORU Yellow   APPEARANCEUA Clear   PHUR 6.0   SPECGRAV 1.020   PROTEINUA Trace*   GLUCUA Negative   KETONESU Negative   BILIRUBINUA Negative   OCCULTUA Negative   NITRITE Negative   UROBILINOGEN Negative   LEUKOCYTESUR Trace*   RBCUA 3   WBCUA 6*   BACTERIA Few*   SQUAMEPITHEL 10       Diagnostic Results:  I have reviewed all pertinent imaging results/findings within the past 24 hours.

## 2022-12-13 PROBLEM — R11.2 NAUSEA AND VOMITING: Status: RESOLVED | Noted: 2022-12-11 | Resolved: 2022-12-13

## 2022-12-13 PROBLEM — R19.03 RIGHT LOWER QUADRANT ABDOMINAL MASS: Status: RESOLVED | Noted: 2022-12-11 | Resolved: 2022-12-13

## 2022-12-13 LAB
ALBUMIN SERPL BCP-MCNC: 3 G/DL (ref 3.5–5.2)
ALP SERPL-CCNC: 57 U/L (ref 55–135)
ALT SERPL W/O P-5'-P-CCNC: 13 U/L (ref 10–44)
ANION GAP SERPL CALC-SCNC: 11 MMOL/L (ref 8–16)
AST SERPL-CCNC: 21 U/L (ref 10–40)
BASOPHILS # BLD AUTO: 0.03 K/UL (ref 0–0.2)
BASOPHILS NFR BLD: 0.3 % (ref 0–1.9)
BILIRUB SERPL-MCNC: 1.8 MG/DL (ref 0.1–1)
BUN SERPL-MCNC: 9 MG/DL (ref 6–20)
CALCIUM SERPL-MCNC: 8.8 MG/DL (ref 8.7–10.5)
CHLORIDE SERPL-SCNC: 99 MMOL/L (ref 95–110)
CO2 SERPL-SCNC: 28 MMOL/L (ref 23–29)
CREAT SERPL-MCNC: 0.7 MG/DL (ref 0.5–1.4)
DIFFERENTIAL METHOD: ABNORMAL
EOSINOPHIL # BLD AUTO: 0.3 K/UL (ref 0–0.5)
EOSINOPHIL NFR BLD: 2.9 % (ref 0–8)
ERYTHROCYTE [DISTWIDTH] IN BLOOD BY AUTOMATED COUNT: 14.3 % (ref 11.5–14.5)
EST. GFR  (NO RACE VARIABLE): >60 ML/MIN/1.73 M^2
GLUCOSE SERPL-MCNC: 87 MG/DL (ref 70–110)
HCT VFR BLD AUTO: 34.7 % (ref 37–48.5)
HGB BLD-MCNC: 10.8 G/DL (ref 12–16)
IMM GRANULOCYTES # BLD AUTO: 0.03 K/UL (ref 0–0.04)
IMM GRANULOCYTES NFR BLD AUTO: 0.3 % (ref 0–0.5)
LYMPHOCYTES # BLD AUTO: 1 K/UL (ref 1–4.8)
LYMPHOCYTES NFR BLD: 9.8 % (ref 18–48)
MCH RBC QN AUTO: 28.4 PG (ref 27–31)
MCHC RBC AUTO-ENTMCNC: 31.1 G/DL (ref 32–36)
MCV RBC AUTO: 91 FL (ref 82–98)
MONOCYTES # BLD AUTO: 1 K/UL (ref 0.3–1)
MONOCYTES NFR BLD: 9.8 % (ref 4–15)
NEUTROPHILS # BLD AUTO: 7.5 K/UL (ref 1.8–7.7)
NEUTROPHILS NFR BLD: 76.9 % (ref 38–73)
NRBC BLD-RTO: 0 /100 WBC
PLATELET # BLD AUTO: 289 K/UL (ref 150–450)
PMV BLD AUTO: 10.3 FL (ref 9.2–12.9)
POCT GLUCOSE: 85 MG/DL (ref 70–110)
POCT GLUCOSE: 90 MG/DL (ref 70–110)
POCT GLUCOSE: 93 MG/DL (ref 70–110)
POCT GLUCOSE: 94 MG/DL (ref 70–110)
POTASSIUM SERPL-SCNC: 4 MMOL/L (ref 3.5–5.1)
PROT SERPL-MCNC: 6 G/DL (ref 6–8.4)
RBC # BLD AUTO: 3.8 M/UL (ref 4–5.4)
SODIUM SERPL-SCNC: 138 MMOL/L (ref 136–145)
WBC # BLD AUTO: 9.74 K/UL (ref 3.9–12.7)

## 2022-12-13 PROCEDURE — 11000001 HC ACUTE MED/SURG PRIVATE ROOM

## 2022-12-13 PROCEDURE — 63600175 PHARM REV CODE 636 W HCPCS: Performed by: SURGERY

## 2022-12-13 PROCEDURE — 25000003 PHARM REV CODE 250

## 2022-12-13 PROCEDURE — 85025 COMPLETE CBC W/AUTO DIFF WBC: CPT | Performed by: SURGERY

## 2022-12-13 PROCEDURE — 99900035 HC TECH TIME PER 15 MIN (STAT)

## 2022-12-13 PROCEDURE — 99232 SBSQ HOSP IP/OBS MODERATE 35: CPT | Mod: ,,, | Performed by: INTERNAL MEDICINE

## 2022-12-13 PROCEDURE — 63600175 PHARM REV CODE 636 W HCPCS

## 2022-12-13 PROCEDURE — 94761 N-INVAS EAR/PLS OXIMETRY MLT: CPT

## 2022-12-13 PROCEDURE — 36415 COLL VENOUS BLD VENIPUNCTURE: CPT | Performed by: SURGERY

## 2022-12-13 PROCEDURE — 25000003 PHARM REV CODE 250: Performed by: SURGERY

## 2022-12-13 PROCEDURE — 94799 UNLISTED PULMONARY SVC/PX: CPT

## 2022-12-13 PROCEDURE — 80053 COMPREHEN METABOLIC PANEL: CPT | Performed by: SURGERY

## 2022-12-13 PROCEDURE — 27000221 HC OXYGEN, UP TO 24 HOURS

## 2022-12-13 PROCEDURE — 99232 PR SUBSEQUENT HOSPITAL CARE,LEVL II: ICD-10-PCS | Mod: ,,, | Performed by: INTERNAL MEDICINE

## 2022-12-13 RX ORDER — MORPHINE SULFATE 2 MG/ML
2 INJECTION, SOLUTION INTRAMUSCULAR; INTRAVENOUS EVERY 4 HOURS PRN
Status: DISCONTINUED | OUTPATIENT
Start: 2022-12-13 | End: 2022-12-19 | Stop reason: HOSPADM

## 2022-12-13 RX ORDER — OXYCODONE HYDROCHLORIDE 5 MG/1
5 TABLET ORAL EVERY 6 HOURS PRN
Status: DISCONTINUED | OUTPATIENT
Start: 2022-12-13 | End: 2022-12-19 | Stop reason: HOSPADM

## 2022-12-13 RX ORDER — OXYCODONE HYDROCHLORIDE 5 MG/1
10 TABLET ORAL EVERY 6 HOURS PRN
Status: DISCONTINUED | OUTPATIENT
Start: 2022-12-13 | End: 2022-12-19 | Stop reason: HOSPADM

## 2022-12-13 RX ADMIN — PANTOPRAZOLE SODIUM 40 MG: 40 TABLET, DELAYED RELEASE ORAL at 09:12

## 2022-12-13 RX ADMIN — SODIUM CHLORIDE, POTASSIUM CHLORIDE, SODIUM LACTATE AND CALCIUM CHLORIDE: 600; 310; 30; 20 INJECTION, SOLUTION INTRAVENOUS at 11:12

## 2022-12-13 RX ADMIN — OXYCODONE HYDROCHLORIDE 10 MG: 5 TABLET ORAL at 07:12

## 2022-12-13 RX ADMIN — HYDROCHLOROTHIAZIDE 12.5 MG: 12.5 TABLET ORAL at 09:12

## 2022-12-13 RX ADMIN — MUPIROCIN: 20 OINTMENT TOPICAL at 09:12

## 2022-12-13 RX ADMIN — LOSARTAN POTASSIUM 100 MG: 50 TABLET, FILM COATED ORAL at 09:12

## 2022-12-13 RX ADMIN — ATORVASTATIN CALCIUM 20 MG: 10 TABLET, FILM COATED ORAL at 09:12

## 2022-12-13 RX ADMIN — MORPHINE SULFATE 2 MG: 2 INJECTION, SOLUTION INTRAMUSCULAR; INTRAVENOUS at 04:12

## 2022-12-13 RX ADMIN — AMLODIPINE BESYLATE 5 MG: 5 TABLET ORAL at 09:12

## 2022-12-13 RX ADMIN — OXYCODONE HYDROCHLORIDE 10 MG: 5 TABLET ORAL at 12:12

## 2022-12-13 NOTE — ASSESSMENT & PLAN NOTE
POD2 S/p ex lap with ileocolonic diversion and biopsy of intra-abdominal mass  F/u surgery recs  Remove NGT  Clear liquids, advance as tolerated  Pain control

## 2022-12-13 NOTE — SUBJECTIVE & OBJECTIVE
Review of Systems   Constitutional:  Negative for activity change, appetite change, fatigue and fever.   Respiratory:  Negative for shortness of breath.    Cardiovascular:  Negative for chest pain.   Gastrointestinal:  Positive for abdominal pain, diarrhea and nausea.   Neurological:  Negative for weakness.   Psychiatric/Behavioral:  Negative for agitation, behavioral problems, confusion, decreased concentration and dysphoric mood. The patient is not nervous/anxious.    Objective:     Vital Signs (Most Recent):  Temp: 98.4 °F (36.9 °C) (12/13/22 0705)  Pulse: 84 (12/13/22 0705)  Resp: (!) 22 (12/13/22 0705)  BP: (!) 117/58 (12/13/22 0930)  SpO2: (!) 94 % (12/13/22 0705) Vital Signs (24h Range):  Temp:  [97.4 °F (36.3 °C)-98.6 °F (37 °C)] 98.4 °F (36.9 °C)  Pulse:  [76-84] 84  Resp:  [17-22] 22  SpO2:  [94 %-97 %] 94 %  BP: ()/(47-59) 117/58     Weight: 104.7 kg (230 lb 13.2 oz)  Body mass index is 38.41 kg/m².    Intake/Output Summary (Last 24 hours) at 12/13/2022 1044  Last data filed at 12/13/2022 0703  Gross per 24 hour   Intake 35.92 ml   Output 1300 ml   Net -1264.08 ml        Physical Exam  Vitals and nursing note reviewed.   Constitutional:       General: She is not in acute distress.     Appearance: She is obese. She is ill-appearing. She is not toxic-appearing.   HENT:      Head: Normocephalic and atraumatic.   Cardiovascular:      Rate and Rhythm: Normal rate.   Pulmonary:      Effort: Pulmonary effort is normal. No respiratory distress.   Abdominal:      General: Bowel sounds are normal. There is distension.      Palpations: Abdomen is soft.      Tenderness: There is abdominal tenderness. There is no guarding.      Comments: Abdomen with moderate distention and expected TTP. Staples in place, clean and dry without SOI    Musculoskeletal:      Right lower leg: No edema.      Left lower leg: No edema.   Skin:     General: Skin is warm.   Neurological:      Mental Status: She is alert and oriented to  person, place, and time.   Psychiatric:         Mood and Affect: Mood is not anxious.         Speech: Speech normal.         Behavior: Behavior normal. Behavior is cooperative.       Significant Labs: All pertinent labs within the past 24 hours have been reviewed.  CBC:   Recent Labs   Lab 12/12/22 0543 12/13/22  0553   WBC 13.84* 9.74   HGB 11.6* 10.8*   HCT 37.0 34.7*    289       CMP:   Recent Labs   Lab 12/12/22 0543 12/13/22  0553    138   K 4.2 4.0    99   CO2 25 28   * 87   BUN 9 9   CREATININE 0.7 0.7   CALCIUM 9.0 8.8   PROT 6.4 6.0   ALBUMIN 3.1* 3.0*   BILITOT 1.3* 1.8*   ALKPHOS 57 57   AST 20 21   ALT 14 13   ANIONGAP 11 11         Significant Imaging: I have reviewed all pertinent imaging results/findings within the past 24 hours.  CT: I have reviewed all pertinent results/findings within the past 24 hours and my personal findings are:  abdomen-soft tissue mass 11cm, peritoneal carinomatosis  Kub-satisfactory placement of ngt

## 2022-12-13 NOTE — ASSESSMENT & PLAN NOTE
"12/11/2022 Reviewed CT personally with patient demonstrating a mass in the right lower quadrant.  Highly suspect shows of malignancy.  At this time my recommendations are to proceed with surgical resection and relieve obstruction.  I have talked to the patient about the possible need for colostomy if unable to do end and anastomosis.  Will defer obviously to surgery in this regard.  Await histological confirmation of mass to see whether not malignant and then to make treatment recommendations.  Reviewed images personally with patient nurse present in room 12/11/2022 12/12/2022.  Reviewed results from surgical procedure.  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  Will await final pathological interpretation.  Discussed implications with patient briefly.  And will be back to discuss with her once final pathology returns      12/13/2022 reviewed information with patient at this time extensive discussion with her I told her I do not know what her diagnosis is there was a large mass that was removed.  We are awaiting histological confirmation is malignancy in which type.  She asked whether not this was curable are not of told her I do not know until we have the pathology report to discuss options in treatments with her  "

## 2022-12-13 NOTE — PROGRESS NOTES
[facility] Surg  General Surgery  Progress Note    Subjective:     History of Present Illness:  58 y/o female referred for right lower quadrant abdominal mass causing bowel obstruction. Patient reports constipation and decreased appetite. Denies any weight loss. Recently began having abdominal pain and bloating. CT in ER showing intraabdominal mass in right lower quadrant causing bowel obstruction.  Only previous abdominal/pelvic surgery was a tubal ligation.      Post-Op Info:  Procedure(s) (LRB):  LAPAROTOMY, EXPLORATORY (N/A)  CYSTOSCOPY, WITH URETERAL STENT INSERTION (Right)  BLOCK, TRANSVERSUS ABDOMINIS PLANE (N/A)  PYELOGRAM, RETROGRADE (Right)   2 Days Post-Op     Interval History: Still with some bloating but complains of minimal abdominal pain. Tolerating clear liquids without nausea and vomiting. Cordova removed today. Minimal abdominal pain, controlled.     Medications:  Continuous Infusions:   lactated ringers 75 mL/hr at 12/12/22 1924     Scheduled Meds:   amLODIPine  5 mg Oral Daily    atorvastatin  20 mg Oral Daily    enoxaparin  40 mg Subcutaneous Daily    hydroCHLOROthiazide  12.5 mg Oral Daily    losartan  100 mg Oral Daily    mupirocin   Nasal BID    pantoprazole  40 mg Oral Daily     PRN Meds:acetaminophen, albuterol-ipratropium, aluminum-magnesium hydroxide-simethicone, dextrose 10%, dextrose 10%, dextrose 10%, dextrose 10%, diphenhydrAMINE, glucagon (human recombinant), glucagon (human recombinant), glucose, glucose, insulin aspart U-100, melatonin, morphine, naloxone, naloxone, ondansetron, oxyCODONE, oxyCODONE, promethazine, sodium chloride 0.9%     Review of patient's allergies indicates:  No Known Allergies  Objective:     Vital Signs (Most Recent):  Temp: 98.4 °F (36.9 °C) (12/13/22 0705)  Pulse: 84 (12/13/22 0705)  Resp: (!) 22 (12/13/22 0705)  BP: (!) 117/58 (12/13/22 0930)  SpO2: (!) 94 % (12/13/22 0705)   Vital Signs (24h Range):  Temp:  [97.4 °F (36.3 °C)-98.6 °F (37 °C)] 98.4  °F (36.9 °C)  Pulse:  [76-84] 84  Resp:  [17-22] 22  SpO2:  [94 %-97 %] 94 %  BP: ()/(47-59) 117/58     Weight: 104.7 kg (230 lb 13.2 oz)  Body mass index is 38.41 kg/m².    Intake/Output - Last 3 Shifts         12/11 0700 12/12 0659 12/12 0700 12/13 0659 12/13 0700 12/14 0659    I.V. (mL/kg) 1363.1 (13) 25.9 (0.2) 14 (0.1)    IV Piggyback 1050      Total Intake(mL/kg) 2413.1 (23) 25.9 (0.2) 14 (0.1)    Urine (mL/kg/hr) 775 (0.3) 1000 (0.4)     Drains 650 700     Total Output 1425 1700     Net +988.1 -1674.1 +14                   Physical Exam  Vitals and nursing note reviewed.   Constitutional:       General: She is not in acute distress.     Appearance: She is well-developed.   HENT:      Head: Normocephalic and atraumatic.      Right Ear: External ear normal.      Left Ear: External ear normal.   Eyes:      Extraocular Movements: Extraocular movements intact.      Conjunctiva/sclera: Conjunctivae normal.   Cardiovascular:      Rate and Rhythm: Normal rate.   Pulmonary:      Effort: Pulmonary effort is normal. No respiratory distress.   Abdominal:      Comments: Abdomen with moderate distention and expected TTP. Staples in place, clean and dry without SOI   Musculoskeletal:      Cervical back: Neck supple.   Skin:     General: Skin is warm and dry.   Neurological:      Mental Status: She is alert and oriented to person, place, and time.   Psychiatric:         Behavior: Behavior normal.       Significant Labs:  I have reviewed all pertinent lab results within the past 24 hours.  CBC:   Recent Labs   Lab 12/13/22  0553   WBC 9.74   RBC 3.80*   HGB 10.8*   HCT 34.7*      MCV 91   MCH 28.4   MCHC 31.1*     CMP:   Recent Labs   Lab 12/13/22  0553   GLU 87   CALCIUM 8.8   ALBUMIN 3.0*   PROT 6.0      K 4.0   CO2 28   CL 99   BUN 9   CREATININE 0.7   ALKPHOS 57   ALT 13   AST 21   BILITOT 1.8*       Significant Diagnostics:  I have reviewed all pertinent imaging results/findings within the past 24  hours.  No new pertinent imaging    Assessment/Plan:     * Bowel obstruction  S/p ex lap with ileocolonic diversion and biopsy of intra-abdominal mass    - Continue clear liquids, monitor for tolerance  - Await full return of bowel function  - Encouraged OOB, ambulation  - DVT and GI prophylaxis  - Incentive spirometry    Class 2 severe obesity due to excess calories with serious comorbidity and body mass index (BMI) of 36.0 to 36.9 in adult  Dietary modifications    Type 2 diabetes mellitus, with long-term current use of insulin  Medical mgmt    Primary hypertension  Medical mgmt        Yaneth Lopez PA-C  General Surgery  [facility] Med S[facility]

## 2022-12-13 NOTE — ASSESSMENT & PLAN NOTE
S/p ex lap with ileocolonic diversion and biopsy of intra-abdominal mass    - Continue clear liquids, monitor for tolerance  - Await full return of bowel function  - Encouraged OOB, ambulation  - DVT and GI prophylaxis  - Incentive spirometry

## 2022-12-13 NOTE — PROGRESS NOTES
[facility] Medicine  Progress Note    Patient Name: Becky Steen  MRN: [id number]  Patient Class: IP- Inpatient   Admission Date: 12/10/2022  Length of Stay: 3 days  Attending Physician: Jamie Horvath MD  Primary Care Provider: SANDEEP Perdue        Subjective:     Principal Problem:Bowel obstruction        HPI:  Becky Steen is a 57 y.o. female with a PMH  has a past medical history of Asthma, Diabetes mellitus, GERD (gastroesophageal reflux disease), HLD (hyperlipidemia), and Hypertension. who presented as a transfer from [facility] for higher level of care after patient presented with complaints of generalized abdominal pain with associated nausea and vomiting and was found to have evidence of a soft tissue mass arising from the cecum/distal small bowel causing bowel obstruction noted on CT abdomen/pelvis. Patient reported acute onset and progressively worsening generalized abdominal pain which began approximately 2 weeks prior to admission. She reported pain is intermittent in frequency and described as a sharp/labored like pains throughout her entire abdomen, currently rated 0/10 in severity but worsens to 10/10 in severity with no known alleviating or aggravating factors noted.  Associated symptoms included nausea, vomiting, constipation, and decreased oral intake but denied endorsing any fever, chills, sweats, chest pain, shortness a breath, dysuria, hematuria, hematochezia, melena, or neurological deficits.  Patient reported having small bowel movement yesterday with intermittent bowel movements throughout the week reported loose/watery nature.  Patient reported last colonoscopy was within 10 years with [facility] and was told to come back 10 years later for repeat.  Patient follows Dr. Alcala at [facility] for OBGYN with last reported mg obtain last year and Pap smear few months ago prior to admission and reported both were normal.  Patient reports history of skin  cancer in her father but is unaware of any other known family history of cancers.  Prior to onset of symptoms, patient reported being in her usual state of health no other concerns or complaints.  All other review of systems negative except as noted above.  Patient admitted to Hospital Medicine for continued medical management of bowel obstruction and further cancer workup. General Surgery aware of patient and awaiting further evaluation in the morning.     PCP: Kerry Aguilar        Overview/Hospital Course:  Admitted for right lower quadrant abdominal mass causing bowel obstruction. POD2 S/p ex lap with ileocolonic diversion and biopsy of intra-abdominal mass. Tolerating clear liquid diet, denies abdominal pain, nausea, vomiting      Review of Systems   Constitutional:  Negative for activity change, appetite change, fatigue and fever.   Respiratory:  Negative for shortness of breath.    Cardiovascular:  Negative for chest pain.   Gastrointestinal:  Positive for abdominal pain, diarrhea and nausea.   Neurological:  Negative for weakness.   Psychiatric/Behavioral:  Negative for agitation, behavioral problems, confusion, decreased concentration and dysphoric mood. The patient is not nervous/anxious.    Objective:     Vital Signs (Most Recent):  Temp: 98.4 °F (36.9 °C) (12/13/22 0705)  Pulse: 84 (12/13/22 0705)  Resp: (!) 22 (12/13/22 0705)  BP: (!) 117/58 (12/13/22 0930)  SpO2: (!) 94 % (12/13/22 0705) Vital Signs (24h Range):  Temp:  [97.4 °F (36.3 °C)-98.6 °F (37 °C)] 98.4 °F (36.9 °C)  Pulse:  [76-84] 84  Resp:  [17-22] 22  SpO2:  [94 %-97 %] 94 %  BP: ()/(47-59) 117/58     Weight: 104.7 kg (230 lb 13.2 oz)  Body mass index is 38.41 kg/m².    Intake/Output Summary (Last 24 hours) at 12/13/2022 1044  Last data filed at 12/13/2022 0703  Gross per 24 hour   Intake 35.92 ml   Output 1300 ml   Net -1264.08 ml        Physical Exam  Vitals and nursing note reviewed.   Constitutional:       General: She is not in  acute distress.     Appearance: She is obese. She is ill-appearing. She is not toxic-appearing.   HENT:      Head: Normocephalic and atraumatic.   Cardiovascular:      Rate and Rhythm: Normal rate.   Pulmonary:      Effort: Pulmonary effort is normal. No respiratory distress.   Abdominal:      General: Bowel sounds are normal. There is distension.      Palpations: Abdomen is soft.      Tenderness: There is abdominal tenderness. There is no guarding.      Comments: Abdomen with moderate distention and expected TTP. Staples in place, clean and dry without SOI    Musculoskeletal:      Right lower leg: No edema.      Left lower leg: No edema.   Skin:     General: Skin is warm.   Neurological:      Mental Status: She is alert and oriented to person, place, and time.   Psychiatric:         Mood and Affect: Mood is not anxious.         Speech: Speech normal.         Behavior: Behavior normal. Behavior is cooperative.       Significant Labs: All pertinent labs within the past 24 hours have been reviewed.  CBC:   Recent Labs   Lab 12/12/22  0543 12/13/22  0553   WBC 13.84* 9.74   HGB 11.6* 10.8*   HCT 37.0 34.7*    289       CMP:   Recent Labs   Lab 12/12/22  0543 12/13/22  0553    138   K 4.2 4.0    99   CO2 25 28   * 87   BUN 9 9   CREATININE 0.7 0.7   CALCIUM 9.0 8.8   PROT 6.4 6.0   ALBUMIN 3.1* 3.0*   BILITOT 1.3* 1.8*   ALKPHOS 57 57   AST 20 21   ALT 14 13   ANIONGAP 11 11         Significant Imaging: I have reviewed all pertinent imaging results/findings within the past 24 hours.  CT: I have reviewed all pertinent results/findings within the past 24 hours and my personal findings are:  abdomen-soft tissue mass 11cm, peritoneal carinomatosis  Kub-satisfactory placement of ngt      Assessment/Plan:      * Bowel obstruction  POD2 S/p ex lap with ileocolonic diversion and biopsy of intra-abdominal mass  F/u surgery recs  Remove NGT  Clear liquids, advance as tolerated  Pain control      Class 2  severe obesity due to excess calories with serious comorbidity and body mass index (BMI) of 36.0 to 36.9 in adult  Body mass index is 36.96 kg/m². Elevation likely secondary to increased calorie intake and sedentary lifestyle. Patient educated on morbidity and mortality in regards to elevated BMI and stressed importance of diet and exercise.   Plan:  -low fat/low calorie diet       Asthma  Patient with history of Asthma currently on inhalers but not home oxygen. Not presently in acute exacerbation and is without signs/symptoms of distress. Patient currently saturating  99% on 2 L/min. CXR revealed satisfactory nasogastric tube with clear lungs and normal appearance of pulmonary vasculature without evidence of pleural effusion or pneumothorax.   Plan:  -Continue home medications, titrate as needed  -Titrate oxygen requirements as needed  -Incentive spirometry   -Monitor pulse oximetry  -Kingsley prn          Type 2 diabetes mellitus, with long-term current use of insulin  Patient's FSGs are controlled on current medication regimen.  Last A1c reviewed-   Lab Results   Component Value Date    HGBA1C 5.9 (H) 12/11/2022     Most recent fingerstick glucose reviewed-   Recent Labs   Lab 12/11/22  0556   POCTGLUCOSE 87     Current correctional scale  low  titrate anti-hyperglycemic dose as follows-   Antihyperglycemics (From admission, onward)    Start     Stop Route Frequency Ordered    12/11/22 0321  insulin aspart U-100 pen 1-10 Units         -- SubQ Every 6 hours PRN 12/11/22 0222      Plan:  -SSI  -Continue home insulin at decreased dose and titrate as needed  -Hold oral hypoglycemics while patient is in the hospital  -Hypoglycemic protocol     Gastroesophageal reflux disease without esophagitis  Chronic. Stable. Currently asymptomatic. Home medications include PPI/Antacids as needed.  Plan:  -Continue PPI/Antacids as needed         Primary hypertension  Currently normotensive.  Plan:  -Optimize pain control   -Continue  home medications, titrate as needed   -Monitor BP  -Low salt/cardiac diet when not NPO  -IV hydralazine prn for SBP>160 or DBP>90       Continue current regimen and adjust accordingly        Generalized abdominal pain  Patient presented with worsening abdominal pain with associated nausea, vomiting, decreased p.o. intake, and constipation x2 weeks' duration and was found to have evidence of bowel obstruction associated with soft tissue mass.  CT abdomen/pelvis revealed an 11 cm soft tissue mass either arising from the cecum or distal small bowel consistent with malignancy causing a high-grade distal mechanical small bowel obstruction in addition to presence of peritoneal carcinomatosis.  Patient remains afebrile without leukocytosis and remains hemodynamically stable.  CMP, CBC, LFTs, and lipase within normal limits.  UA negative for UTI.  Patient status post NG tube insertion and currently on suction.  General surgery consulted and awaiting further evaluation/recommendations.  CEA and  ordered and pending prior to transfer.  Oncology consulted for malignant workup and to establish care.    Plan:  -NPO  -Continue current pain regimen, titrate as needed  -Bowel regimen  -Bedrest  -Continue NGT decompression  -IVFs prn  -f/u CEA and   -Antiemetics prn  -Tylenol as needed for fever   -f/u general surgery and oncology  -PT/OT     12/11  cea within normal limits  ca125 mildly elevated  Awaiting surgery today  Continue ngt          VTE Risk Mitigation (From admission, onward)         Ordered     enoxaparin injection 40 mg  Daily         12/10/22 2225     IP VTE HIGH RISK PATIENT  Once         12/10/22 2225     Place sequential compression device  Until discontinued         12/10/22 2225                Discharge Planning   FABIANA:      Code Status: Full Code   Is the patient medically ready for discharge?:     Reason for patient still in hospital (select all that apply): Patient trending condition, Treatment and  Consult recommendations  Discharge Plan A: Home                  Jamie Horvath MD  Department of Hospital Medicine   '[facility]

## 2022-12-13 NOTE — SUBJECTIVE & OBJECTIVE
Interval History: Still with some bloating but complains of minimal abdominal pain. Tolerating clear liquids without nausea and vomiting. Cordova removed today. Minimal abdominal pain, controlled.     Medications:  Continuous Infusions:   lactated ringers 75 mL/hr at 12/12/22 1924     Scheduled Meds:   amLODIPine  5 mg Oral Daily    atorvastatin  20 mg Oral Daily    enoxaparin  40 mg Subcutaneous Daily    hydroCHLOROthiazide  12.5 mg Oral Daily    losartan  100 mg Oral Daily    mupirocin   Nasal BID    pantoprazole  40 mg Oral Daily     PRN Meds:acetaminophen, albuterol-ipratropium, aluminum-magnesium hydroxide-simethicone, dextrose 10%, dextrose 10%, dextrose 10%, dextrose 10%, diphenhydrAMINE, glucagon (human recombinant), glucagon (human recombinant), glucose, glucose, insulin aspart U-100, melatonin, morphine, naloxone, naloxone, ondansetron, oxyCODONE, oxyCODONE, promethazine, sodium chloride 0.9%     Review of patient's allergies indicates:  No Known Allergies  Objective:     Vital Signs (Most Recent):  Temp: 98.4 °F (36.9 °C) (12/13/22 0705)  Pulse: 84 (12/13/22 0705)  Resp: (!) 22 (12/13/22 0705)  BP: (!) 117/58 (12/13/22 0930)  SpO2: (!) 94 % (12/13/22 0705)   Vital Signs (24h Range):  Temp:  [97.4 °F (36.3 °C)-98.6 °F (37 °C)] 98.4 °F (36.9 °C)  Pulse:  [76-84] 84  Resp:  [17-22] 22  SpO2:  [94 %-97 %] 94 %  BP: ()/(47-59) 117/58     Weight: 104.7 kg (230 lb 13.2 oz)  Body mass index is 38.41 kg/m².    Intake/Output - Last 3 Shifts         12/11 0700 12/12 0659 12/12 0700 12/13 0659 12/13 0700 12/14 0659    I.V. (mL/kg) 1363.1 (13) 25.9 (0.2) 14 (0.1)    IV Piggyback 1050      Total Intake(mL/kg) 2413.1 (23) 25.9 (0.2) 14 (0.1)    Urine (mL/kg/hr) 775 (0.3) 1000 (0.4)     Drains 650 700     Total Output 1425 1700     Net +988.1 -1674.1 +14                   Physical Exam  Vitals and nursing note reviewed.   Constitutional:       General: She is not in acute distress.     Appearance: She is  well-developed.   HENT:      Head: Normocephalic and atraumatic.      Right Ear: External ear normal.      Left Ear: External ear normal.   Eyes:      Extraocular Movements: Extraocular movements intact.      Conjunctiva/sclera: Conjunctivae normal.   Cardiovascular:      Rate and Rhythm: Normal rate.   Pulmonary:      Effort: Pulmonary effort is normal. No respiratory distress.   Abdominal:      Comments: Abdomen with moderate distention and expected TTP. Staples in place, clean and dry without SOI   Musculoskeletal:      Cervical back: Neck supple.   Skin:     General: Skin is warm and dry.   Neurological:      Mental Status: She is alert and oriented to person, place, and time.   Psychiatric:         Behavior: Behavior normal.       Significant Labs:  I have reviewed all pertinent lab results within the past 24 hours.  CBC:   Recent Labs   Lab 12/13/22  0553   WBC 9.74   RBC 3.80*   HGB 10.8*   HCT 34.7*      MCV 91   MCH 28.4   MCHC 31.1*     CMP:   Recent Labs   Lab 12/13/22  0553   GLU 87   CALCIUM 8.8   ALBUMIN 3.0*   PROT 6.0      K 4.0   CO2 28   CL 99   BUN 9   CREATININE 0.7   ALKPHOS 57   ALT 13   AST 21   BILITOT 1.8*       Significant Diagnostics:  I have reviewed all pertinent imaging results/findings within the past 24 hours.  No new pertinent imaging

## 2022-12-13 NOTE — PROGRESS NOTES
[facility] Surg  Hematology/Oncology  Progress Note    Patient Name: Becky Steen  Admission Date: 12/10/2022  Hospital Length of Stay: 3 days  Code Status: Full Code     Subjective:     HPI:  62-year-old female history of abdominal pain over the last 2 weeks.  She states that his pain in her lower abdomen cramping in nature.  Patient that she did have a [facility]oscopy [facility] in distant past she is not sure when aware.  Patient presents with abdominal pain and bowel obstruction with multiple areas of air-fluid level with mass in right lower quadrant I was asked to see the patient for further evaluation ECOG status 2 NG tube in place      Interval History:  patient is resting comfortably at present time with clear liquids asking questions about her surgery    Oncology Treatment Plan:   [No matching plan found]    Medications:  Continuous Infusions:   hydromorphone in 0.9 % NaCl 6 mg/30 ml      lactated ringers 75 mL/hr at 12/12/22 1924     Scheduled Meds:   amLODIPine  5 mg Oral Daily    atorvastatin  20 mg Oral Daily    enoxaparin  40 mg Subcutaneous Daily    hydroCHLOROthiazide  12.5 mg Oral Daily    losartan  100 mg Oral Daily    mupirocin   Nasal BID    pantoprazole  40 mg Oral Daily     PRN Meds:acetaminophen, acetaminophen, albuterol-ipratropium, aluminum-magnesium hydroxide-simethicone, dextrose 10%, dextrose 10%, dextrose 10%, dextrose 10%, diphenhydrAMINE, glucagon (human recombinant), glucagon (human recombinant), glucose, glucose, insulin aspart U-100, melatonin, naloxone, naloxone, ondansetron, promethazine, sodium chloride 0.9%     Review of Systems   Constitutional:  Positive for fatigue. Negative for activity change, appetite change, chills, diaphoresis, fever and unexpected weight change.   HENT:  Negative for congestion, dental problem, drooling, ear discharge, ear pain, facial swelling, hearing loss, mouth sores, nosebleeds, postnasal drip, rhinorrhea, sinus pressure, sneezing,  sore throat, tinnitus, trouble swallowing and voice change.    Eyes:  Negative for photophobia, pain, discharge, redness, itching and visual disturbance.   Respiratory:  Negative for cough, choking, chest tightness, shortness of breath, wheezing and stridor.    Cardiovascular:  Negative for chest pain, palpitations and leg swelling.   Gastrointestinal:  Positive for abdominal pain. Negative for abdominal distention, anal bleeding, blood in stool, constipation, diarrhea, nausea, rectal pain and vomiting.   Endocrine: Negative for cold intolerance, heat intolerance, polydipsia, polyphagia and polyuria.   Genitourinary:  Negative for decreased urine volume, difficulty urinating, dyspareunia, dysuria, enuresis, flank pain, frequency, genital sores, hematuria, menstrual problem, pelvic pain, urgency, vaginal bleeding, vaginal discharge and vaginal pain.   Musculoskeletal:  Negative for arthralgias, back pain, gait problem, joint swelling, myalgias, neck pain and neck stiffness.   Skin:  Negative for color change, pallor and rash.   Allergic/Immunologic: Negative for environmental allergies, food allergies and immunocompromised state.   Neurological:  Positive for weakness. Negative for dizziness, tremors, seizures, syncope, facial asymmetry, speech difficulty, light-headedness, numbness and headaches.   Hematological:  Negative for adenopathy. Does not bruise/bleed easily.   Psychiatric/Behavioral:  Positive for dysphoric mood. Negative for agitation, behavioral problems, confusion, decreased concentration, hallucinations, self-injury, sleep disturbance and suicidal ideas. The patient is nervous/anxious. The patient is not hyperactive.    Objective:     Vital Signs (Most Recent):  Temp: 98.4 °F (36.9 °C) (12/13/22 0705)  Pulse: 84 (12/13/22 0705)  Resp: (!) 22 (12/13/22 0705)  BP: (!) 117/58 (12/13/22 0705)  SpO2: (!) 94 % (12/13/22 0705)   Vital Signs (24h Range):  Temp:  [97.4 °F (36.3 °C)-98.6 °F (37 °C)] 98.4 °F  (36.9 °C)  Pulse:  [75-84] 84  Resp:  [17-22] 22  SpO2:  [94 %-97 %] 94 %  BP: ()/(47-59) 117/58     Weight: 104.7 kg (230 lb 13.2 oz)  Body mass index is 38.41 kg/m².  Body surface area is 2.19 meters squared.      Intake/Output Summary (Last 24 hours) at 12/13/2022 0751  Last data filed at 12/13/2022 0703  Gross per 24 hour   Intake 35.92 ml   Output 1700 ml   Net -1664.08 ml       Physical Exam  Vitals reviewed.   Constitutional:       General: She is not in acute distress.     Appearance: She is well-developed. She is obese. She is ill-appearing. She is not diaphoretic.   HENT:      Head: Normocephalic and atraumatic.      Comments: NG tube removed nasal O2 still present     Right Ear: External ear normal.      Left Ear: External ear normal.      Nose: Nose normal.      Right Sinus: No maxillary sinus tenderness or frontal sinus tenderness.      Left Sinus: No maxillary sinus tenderness or frontal sinus tenderness.      Mouth/Throat:      Pharynx: No oropharyngeal exudate.   Eyes:      General: Lids are normal. No scleral icterus.        Right eye: No discharge.         Left eye: No discharge.      Conjunctiva/sclera: Conjunctivae normal.      Right eye: Right conjunctiva is not injected. No hemorrhage.     Left eye: Left conjunctiva is not injected. No hemorrhage.     Pupils: Pupils are equal, round, and reactive to light.   Neck:      Thyroid: No thyromegaly.      Vascular: No JVD.      Trachea: No tracheal deviation.   Cardiovascular:      Rate and Rhythm: Normal rate.   Pulmonary:      Effort: Pulmonary effort is normal. No respiratory distress.      Breath sounds: No stridor.   Chest:      Chest wall: No tenderness.   Abdominal:      General: Bowel sounds are normal. There is no distension.      Palpations: Abdomen is soft. There is no hepatomegaly, splenomegaly or mass.      Tenderness: There is no abdominal tenderness. There is no rebound.   Musculoskeletal:         General: No tenderness. Normal  range of motion.      Cervical back: Normal range of motion and neck supple.   Lymphadenopathy:      Cervical: No cervical adenopathy.      Upper Body:      Right upper body: No supraclavicular adenopathy.      Left upper body: No supraclavicular adenopathy.   Skin:     General: Skin is dry.      Findings: No erythema or rash.   Neurological:      Mental Status: She is alert and oriented to person, place, and time.      Cranial Nerves: No cranial nerve deficit.      Motor: Weakness present.      Coordination: Coordination normal.   Psychiatric:         Mood and Affect: Mood is anxious and depressed.         Behavior: Behavior normal.         Thought Content: Thought content normal.         Judgment: Judgment normal.       Significant Labs:   BMP:   Recent Labs   Lab 12/12/22  0543 12/13/22  0553   * 87    138   K 4.2 4.0    99   CO2 25 28   BUN 9 9   CREATININE 0.7 0.7   CALCIUM 9.0 8.8   , CBC:   Recent Labs   Lab 12/12/22  0543 12/13/22  0553   WBC 13.84* 9.74   HGB 11.6* 10.8*   HCT 37.0 34.7*    289   , CMP:   Recent Labs   Lab 12/12/22  0543 12/13/22  0553    138   K 4.2 4.0    99   CO2 25 28   * 87   BUN 9 9   CREATININE 0.7 0.7   CALCIUM 9.0 8.8   PROT 6.4 6.0   ALBUMIN 3.1* 3.0*   BILITOT 1.3* 1.8*   ALKPHOS 57 57   AST 20 21   ALT 14 13   ANIONGAP 11 11   , Coagulation: No results for input(s): PT, INR, APTT in the last 48 hours., Haptoglobin: No results for input(s): HAPTOGLOBIN in the last 48 hours., Immunology: No results for input(s): SPEP, SUZY, ANETA, FREELAMBDALI in the last 48 hours., LDH: No results for input(s): LDHCSF, BFSOURCE in the last 48 hours., LFTs:   Recent Labs   Lab 12/12/22  0543 12/13/22  0553   ALT 14 13   AST 20 21   ALKPHOS 57 57   BILITOT 1.3* 1.8*   PROT 6.4 6.0   ALBUMIN 3.1* 3.0*   , Reticulocytes: No results for input(s): RETIC in the last 48 hours., Tumor Markers: No results for input(s): PSA, CEA, , AFPTM, [id number],  in the  "last 48 hours.    Invalid input(s): ALGTM, Uric Acid No results for input(s): URICACID in the last 48 hours., and Urine Studies: No results for input(s): COLORU, APPEARANCEUA, PHUR, SPECGRAV, PROTEINUA, GLUCUA, KETONESU, BILIRUBINUA, OCCULTUA, NITRITE, UROBILINOGEN, LEUKOCYTESUR, RBCUA, WBCUA, BACTERIA, SQUAMEPITHEL, HYALINECASTS in the last 48 hours.    Invalid input(s): MAKSIMSUR    Diagnostic Results:  I have reviewed all pertinent imaging results/findings within the past 24 hours.    Assessment/Plan:     * Bowel obstruction  Bowel obstruction secondary to abdominal mass    Right lower quadrant abdominal mass  12/11/2022 Reviewed CT personally with patient demonstrating a mass in the right lower quadrant.  Highly suspect shows of malignancy.  At this time my recommendations are to proceed with surgical resection and relieve obstruction.  I have talked to the patient about the possible need for colostomy if unable to do end and anastomosis.  Will defer obviously to surgery in this regard.  Await histological confirmation of mass to see whether not malignant and then to make treatment recommendations.  Reviewed images personally with patient nurse present in room 12/11/2022 12/12/2022.  Reviewed results from surgical procedure.  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  Will await final pathological interpretation.  Discussed implications with patient briefly.  And will be back to discuss with her once final pathology returns      12/13/2022 reviewed information with patient at this time extensive discussion with her I told her I do not know what her diagnosis is there was a large mass that was removed.  We are awaiting histological confirmation is " malignancy in which type.  She asked whether not this was curable are not of told her I do not know until we have the pathology report to discuss options in treatments with her    Type 2 diabetes mellitus, with long-term current use of insulin  Cared for by primary care team    Primary hypertension  Cared for by primary care team    Generalized abdominal pain  Secondary to bowel obstruction and abdominal mass        Thank you for your consult. I will follow-up with patient. Please contact us if you have any additional questions.     Kenton Christianson MD  Hematology/Oncology  [facility]

## 2022-12-13 NOTE — SUBJECTIVE & OBJECTIVE
Interval History:  patient is resting comfortably at present time with clear liquids asking questions about her surgery    Oncology Treatment Plan:   [No matching plan found]    Medications:  Continuous Infusions:   hydromorphone in 0.9 % NaCl 6 mg/30 ml      lactated ringers 75 mL/hr at 12/12/22 1924     Scheduled Meds:   amLODIPine  5 mg Oral Daily    atorvastatin  20 mg Oral Daily    enoxaparin  40 mg Subcutaneous Daily    hydroCHLOROthiazide  12.5 mg Oral Daily    losartan  100 mg Oral Daily    mupirocin   Nasal BID    pantoprazole  40 mg Oral Daily     PRN Meds:acetaminophen, acetaminophen, albuterol-ipratropium, aluminum-magnesium hydroxide-simethicone, dextrose 10%, dextrose 10%, dextrose 10%, dextrose 10%, diphenhydrAMINE, glucagon (human recombinant), glucagon (human recombinant), glucose, glucose, insulin aspart U-100, melatonin, naloxone, naloxone, ondansetron, promethazine, sodium chloride 0.9%     Review of Systems   Constitutional:  Positive for fatigue. Negative for activity change, appetite change, chills, diaphoresis, fever and unexpected weight change.   HENT:  Negative for congestion, dental problem, drooling, ear discharge, ear pain, facial swelling, hearing loss, mouth sores, nosebleeds, postnasal drip, rhinorrhea, sinus pressure, sneezing, sore throat, tinnitus, trouble swallowing and voice change.    Eyes:  Negative for photophobia, pain, discharge, redness, itching and visual disturbance.   Respiratory:  Negative for cough, choking, chest tightness, shortness of breath, wheezing and stridor.    Cardiovascular:  Negative for chest pain, palpitations and leg swelling.   Gastrointestinal:  Positive for abdominal pain. Negative for abdominal distention, anal bleeding, blood in stool, constipation, diarrhea, nausea, rectal pain and vomiting.   Endocrine: Negative for cold intolerance, heat intolerance, polydipsia, polyphagia and polyuria.   Genitourinary:  Negative for decreased urine volume,  difficulty urinating, dyspareunia, dysuria, enuresis, flank pain, frequency, genital sores, hematuria, menstrual problem, pelvic pain, urgency, vaginal bleeding, vaginal discharge and vaginal pain.   Musculoskeletal:  Negative for arthralgias, back pain, gait problem, joint swelling, myalgias, neck pain and neck stiffness.   Skin:  Negative for color change, pallor and rash.   Allergic/Immunologic: Negative for environmental allergies, food allergies and immunocompromised state.   Neurological:  Positive for weakness. Negative for dizziness, tremors, seizures, syncope, facial asymmetry, speech difficulty, light-headedness, numbness and headaches.   Hematological:  Negative for adenopathy. Does not bruise/bleed easily.   Psychiatric/Behavioral:  Positive for dysphoric mood. Negative for agitation, behavioral problems, confusion, decreased concentration, hallucinations, self-injury, sleep disturbance and suicidal ideas. The patient is nervous/anxious. The patient is not hyperactive.    Objective:     Vital Signs (Most Recent):  Temp: 98.4 °F (36.9 °C) (12/13/22 0705)  Pulse: 84 (12/13/22 0705)  Resp: (!) 22 (12/13/22 0705)  BP: (!) 117/58 (12/13/22 0705)  SpO2: (!) 94 % (12/13/22 0705)   Vital Signs (24h Range):  Temp:  [97.4 °F (36.3 °C)-98.6 °F (37 °C)] 98.4 °F (36.9 °C)  Pulse:  [75-84] 84  Resp:  [17-22] 22  SpO2:  [94 %-97 %] 94 %  BP: ()/(47-59) 117/58     Weight: 104.7 kg (230 lb 13.2 oz)  Body mass index is 38.41 kg/m².  Body surface area is 2.19 meters squared.      Intake/Output Summary (Last 24 hours) at 12/13/2022 0751  Last data filed at 12/13/2022 0703  Gross per 24 hour   Intake 35.92 ml   Output 1700 ml   Net -1664.08 ml       Physical Exam  Vitals reviewed.   Constitutional:       General: She is not in acute distress.     Appearance: She is well-developed. She is obese. She is ill-appearing. She is not diaphoretic.   HENT:      Head: Normocephalic and atraumatic.      Comments: NG tube removed  nasal O2 still present     Right Ear: External ear normal.      Left Ear: External ear normal.      Nose: Nose normal.      Right Sinus: No maxillary sinus tenderness or frontal sinus tenderness.      Left Sinus: No maxillary sinus tenderness or frontal sinus tenderness.      Mouth/Throat:      Pharynx: No oropharyngeal exudate.   Eyes:      General: Lids are normal. No scleral icterus.        Right eye: No discharge.         Left eye: No discharge.      Conjunctiva/sclera: Conjunctivae normal.      Right eye: Right conjunctiva is not injected. No hemorrhage.     Left eye: Left conjunctiva is not injected. No hemorrhage.     Pupils: Pupils are equal, round, and reactive to light.   Neck:      Thyroid: No thyromegaly.      Vascular: No JVD.      Trachea: No tracheal deviation.   Cardiovascular:      Rate and Rhythm: Normal rate.   Pulmonary:      Effort: Pulmonary effort is normal. No respiratory distress.      Breath sounds: No stridor.   Chest:      Chest wall: No tenderness.   Abdominal:      General: Bowel sounds are normal. There is no distension.      Palpations: Abdomen is soft. There is no hepatomegaly, splenomegaly or mass.      Tenderness: There is no abdominal tenderness. There is no rebound.   Musculoskeletal:         General: No tenderness. Normal range of motion.      Cervical back: Normal range of motion and neck supple.   Lymphadenopathy:      Cervical: No cervical adenopathy.      Upper Body:      Right upper body: No supraclavicular adenopathy.      Left upper body: No supraclavicular adenopathy.   Skin:     General: Skin is dry.      Findings: No erythema or rash.   Neurological:      Mental Status: She is alert and oriented to person, place, and time.      Cranial Nerves: No cranial nerve deficit.      Motor: Weakness present.      Coordination: Coordination normal.   Psychiatric:         Mood and Affect: Mood is anxious and depressed.         Behavior: Behavior normal.         Thought Content:  Thought content normal.         Judgment: Judgment normal.       Significant Labs:   BMP:   Recent Labs   Lab 12/12/22  0543 12/13/22  0553   * 87    138   K 4.2 4.0    99   CO2 25 28   BUN 9 9   CREATININE 0.7 0.7   CALCIUM 9.0 8.8   , CBC:   Recent Labs   Lab 12/12/22  0543 12/13/22  0553   WBC 13.84* 9.74   HGB 11.6* 10.8*   HCT 37.0 34.7*    289   , CMP:   Recent Labs   Lab 12/12/22  0543 12/13/22  0553    138   K 4.2 4.0    99   CO2 25 28   * 87   BUN 9 9   CREATININE 0.7 0.7   CALCIUM 9.0 8.8   PROT 6.4 6.0   ALBUMIN 3.1* 3.0*   BILITOT 1.3* 1.8*   ALKPHOS 57 57   AST 20 21   ALT 14 13   ANIONGAP 11 11   , Coagulation: No results for input(s): PT, INR, APTT in the last 48 hours., Haptoglobin: No results for input(s): HAPTOGLOBIN in the last 48 hours., Immunology: No results for input(s): SPEP, SUZY, ANETA, FREELAMBDALI in the last 48 hours., LDH: No results for input(s): LDHCSF, BFSOURCE in the last 48 hours., LFTs:   Recent Labs   Lab 12/12/22  0543 12/13/22  0553   ALT 14 13   AST 20 21   ALKPHOS 57 57   BILITOT 1.3* 1.8*   PROT 6.4 6.0   ALBUMIN 3.1* 3.0*   , Reticulocytes: No results for input(s): RETIC in the last 48 hours., Tumor Markers: No results for input(s): PSA, CEA, , AFPTM, [id number],  in the last 48 hours.    Invalid input(s): ALGTM, Uric Acid No results for input(s): URICACID in the last 48 hours., and Urine Studies: No results for input(s): COLORU, APPEARANCEUA, PHUR, SPECGRAV, PROTEINUA, GLUCUA, KETONESU, BILIRUBINUA, OCCULTUA, NITRITE, UROBILINOGEN, LEUKOCYTESUR, RBCUA, WBCUA, BACTERIA, SQUAMEPITHEL, HYALINECASTS in the last 48 hours.    Invalid input(s): CHAPINCITO    Diagnostic Results:  I have reviewed all pertinent imaging results/findings within the past 24 hours.

## 2022-12-13 NOTE — PLAN OF CARE
Plan of care reviewed with patient with verbal understanding. Chart and orders reviewed.  Pt remains free from falls this shift, Safety precautions in place.   Pt currently resting comfortably in bed. Pain kept manageable with the dilaudid PCA pump (see emar for pain admin).  Hourly rounding with bed in lowest position, side rails up, call light in reach.  Will continue to monitor until end of shift.       Problem: Adult Inpatient Plan of Care  Goal: Plan of Care Review  Outcome: Ongoing, Progressing  Goal: Optimal Comfort and Wellbeing  Outcome: Ongoing, Progressing

## 2022-12-13 NOTE — ASSESSMENT & PLAN NOTE
S/p ex lap with ileocolonic diversion and biopsy of intra-abdominal mass    - Continue clear liquids, monitor for tolerance  - Await full return of bowel function, bowel regimen  - Encouraged OOB, ambulation  - DVT and GI prophylaxis  - Incentive spirometry  -p.o. pain medication with IV breakthrough

## 2022-12-14 PROBLEM — M25.532 WRIST PAIN, ACUTE, LEFT: Status: ACTIVE | Noted: 2022-12-14

## 2022-12-14 LAB
POCT GLUCOSE: 90 MG/DL (ref 70–110)
POCT GLUCOSE: 93 MG/DL (ref 70–110)
POCT GLUCOSE: 96 MG/DL (ref 70–110)
POCT GLUCOSE: 99 MG/DL (ref 70–110)

## 2022-12-14 PROCEDURE — 63600175 PHARM REV CODE 636 W HCPCS: Performed by: SURGERY

## 2022-12-14 PROCEDURE — 63600175 PHARM REV CODE 636 W HCPCS

## 2022-12-14 PROCEDURE — 25000003 PHARM REV CODE 250: Performed by: SURGERY

## 2022-12-14 PROCEDURE — 25000003 PHARM REV CODE 250: Performed by: NURSE PRACTITIONER

## 2022-12-14 PROCEDURE — 99232 PR SUBSEQUENT HOSPITAL CARE,LEVL II: ICD-10-PCS | Mod: ,,, | Performed by: INTERNAL MEDICINE

## 2022-12-14 PROCEDURE — 99232 SBSQ HOSP IP/OBS MODERATE 35: CPT | Mod: ,,, | Performed by: INTERNAL MEDICINE

## 2022-12-14 PROCEDURE — 94761 N-INVAS EAR/PLS OXIMETRY MLT: CPT

## 2022-12-14 PROCEDURE — 11000001 HC ACUTE MED/SURG PRIVATE ROOM

## 2022-12-14 PROCEDURE — 25000003 PHARM REV CODE 250

## 2022-12-14 RX ORDER — POLYETHYLENE GLYCOL 3350 17 G/17G
17 POWDER, FOR SOLUTION ORAL DAILY
Status: DISCONTINUED | OUTPATIENT
Start: 2022-12-14 | End: 2022-12-15

## 2022-12-14 RX ADMIN — OXYCODONE HYDROCHLORIDE 10 MG: 5 TABLET ORAL at 01:12

## 2022-12-14 RX ADMIN — LOSARTAN POTASSIUM 100 MG: 50 TABLET, FILM COATED ORAL at 09:12

## 2022-12-14 RX ADMIN — PANTOPRAZOLE SODIUM 40 MG: 40 TABLET, DELAYED RELEASE ORAL at 09:12

## 2022-12-14 RX ADMIN — OXYCODONE HYDROCHLORIDE 10 MG: 5 TABLET ORAL at 10:12

## 2022-12-14 RX ADMIN — ENOXAPARIN SODIUM 40 MG: 40 INJECTION SUBCUTANEOUS at 05:12

## 2022-12-14 RX ADMIN — ATORVASTATIN CALCIUM 20 MG: 10 TABLET, FILM COATED ORAL at 09:12

## 2022-12-14 RX ADMIN — MUPIROCIN: 20 OINTMENT TOPICAL at 08:12

## 2022-12-14 RX ADMIN — OXYCODONE HYDROCHLORIDE 10 MG: 5 TABLET ORAL at 07:12

## 2022-12-14 RX ADMIN — DIPHENHYDRAMINE HYDROCHLORIDE 25 MG: 25 CAPSULE ORAL at 10:12

## 2022-12-14 RX ADMIN — Medication 6 MG: at 10:12

## 2022-12-14 RX ADMIN — POLYETHYLENE GLYCOL 3350 17 G: 17 POWDER, FOR SOLUTION ORAL at 11:12

## 2022-12-14 RX ADMIN — MORPHINE SULFATE 2 MG: 2 INJECTION, SOLUTION INTRAMUSCULAR; INTRAVENOUS at 06:12

## 2022-12-14 RX ADMIN — AMLODIPINE BESYLATE 5 MG: 5 TABLET ORAL at 09:12

## 2022-12-14 RX ADMIN — HYDROCHLOROTHIAZIDE 12.5 MG: 12.5 TABLET ORAL at 09:12

## 2022-12-14 RX ADMIN — MUPIROCIN: 20 OINTMENT TOPICAL at 10:12

## 2022-12-14 NOTE — ASSESSMENT & PLAN NOTE
POD3 S/p ex lap with ileocolonic diversion and biopsy of intra-abdominal mass  F/u surgery recs  Remove NGT  Clear liquids, advance as tolerated  Pain control

## 2022-12-14 NOTE — ASSESSMENT & PLAN NOTE
-s/p ex lap with ileocolonic diversion and biopsy of intra-abdominal mass  -pending pathology   -General surgery following  -Diet advancement per General surgery/primary team  -Plan for outpatient Oncology follow up for pathology results

## 2022-12-14 NOTE — PLAN OF CARE
Pt remains free from falls/injuries this shift. Safety precautions maintained. Pain managed with pain medication. Patient ambulating, No BM today. Dressing Clean, dry, and intact. No s/s of acute distress noted. Will continue to monitor. Chart check completed.

## 2022-12-14 NOTE — ASSESSMENT & PLAN NOTE
Patient presented with worsening abdominal pain with associated nausea, vomiting, decreased p.o. intake, and constipation x2 weeks' duration and was found to have evidence of bowel obstruction associated with soft tissue mass.  CT abdomen/pelvis revealed an 11 cm soft tissue mass either arising from the cecum or distal small bowel consistent with malignancy causing a high-grade distal mechanical small bowel obstruction in addition to presence of peritoneal carcinomatosis.  Patient remains afebrile without leukocytosis and remains hemodynamically stable.  CMP, CBC, LFTs, and lipase within normal limits.  UA negative for UTI.  Patient status post NG tube insertion and currently on suction.  General surgery consulted and awaiting further evaluation/recommendations.  CEA and  ordered and pending prior to transfer.  Oncology consulted for malignant workup and to establish care.    Plan:  -Continue current pain regimen, titrate as needed  -advance diet per surgery

## 2022-12-14 NOTE — PROGRESS NOTES
[facility] Medicine  Progress Note    Patient Name: Becky Steen  MRN: [id number]  Patient Class: IP- Inpatient   Admission Date: 12/10/2022  Length of Stay: 4 days  Attending Physician: Jamie Horvath MD  Primary Care Provider: SANDEEP Perdue        Subjective:     Principal Problem:Bowel obstruction        HPI:  Becky Steen is a 57 y.o. female with a PMH  has a past medical history of Asthma, Diabetes mellitus, GERD (gastroesophageal reflux disease), HLD (hyperlipidemia), and Hypertension. who presented as a transfer from [facility] for higher level of care after patient presented with complaints of generalized abdominal pain with associated nausea and vomiting and was found to have evidence of a soft tissue mass arising from the cecum/distal small bowel causing bowel obstruction noted on CT abdomen/pelvis. Patient reported acute onset and progressively worsening generalized abdominal pain which began approximately 2 weeks prior to admission. She reported pain is intermittent in frequency and described as a sharp/labored like pains throughout her entire abdomen, currently rated 0/10 in severity but worsens to 10/10 in severity with no known alleviating or aggravating factors noted.  Associated symptoms included nausea, vomiting, constipation, and decreased oral intake but denied endorsing any fever, chills, sweats, chest pain, shortness a breath, dysuria, hematuria, hematochezia, melena, or neurological deficits.  Patient reported having small bowel movement yesterday with intermittent bowel movements throughout the week reported loose/watery nature.  Patient reported last [facility] was within [facility] and was told to come back 10 years later for repeat.  Patient follows Dr. Alcala at [facility] for OBGYN with last reported mg obtain last year and Pap smear few months ago prior to admission and reported both were normal.  Patient reports history of skin  cancer in her father but is unaware of any other known family history of cancers.  Prior to onset of symptoms, patient reported being in her usual state of health no other concerns or complaints.  All other review of systems negative except as noted above.  Patient admitted to Hospital Medicine for continued medical management of bowel obstruction and further cancer workup. General Surgery aware of patient and awaiting further evaluation in the morning.     PCP: Kerry Aguilar        Overview/Hospital Course:  Admitted for right lower quadrant abdominal mass causing bowel obstruction. POD3 S/p ex lap with ileocolonic diversion and biopsy of intra-abdominal mass. Tolerating clear liquid diet, denies abdominal pain, nausea, vomiting      Review of Systems   Constitutional:  Negative for activity change, appetite change, fatigue and fever.   Respiratory:  Negative for shortness of breath.    Cardiovascular:  Negative for chest pain.   Gastrointestinal:  Positive for abdominal pain, diarrhea and nausea.   Neurological:  Negative for weakness.   Psychiatric/Behavioral:  Negative for agitation, behavioral problems, confusion, decreased concentration and dysphoric mood. The patient is not nervous/anxious.    Objective:     Vital Signs (Most Recent):  Temp: 98.2 °F (36.8 °C) (12/14/22 0704)  Pulse: 82 (12/14/22 0925)  Resp: 18 (12/14/22 0925)  BP: 126/72 (12/14/22 0955)  SpO2: (!) 94 % (12/14/22 0925) Vital Signs (24h Range):  Temp:  [98.1 °F (36.7 °C)-98.7 °F (37.1 °C)] 98.2 °F (36.8 °C)  Pulse:  [] 82  Resp:  [16-19] 18  SpO2:  [91 %-95 %] 94 %  BP: ()/(55-72) 126/72     Weight: 107.3 kg (236 lb 8.9 oz)  Body mass index is 39.36 kg/m².  No intake or output data in the 24 hours ending 12/14/22 1242     Physical Exam  Vitals and nursing note reviewed.   Constitutional:       General: She is not in acute distress.     Appearance: She is obese. She is ill-appearing. She is not toxic-appearing.   HENT:      Head:  Normocephalic and atraumatic.   Cardiovascular:      Rate and Rhythm: Normal rate.   Pulmonary:      Effort: Pulmonary effort is normal. No respiratory distress.   Abdominal:      General: Bowel sounds are normal. There is distension.      Palpations: Abdomen is soft.      Tenderness: There is abdominal tenderness. There is no guarding.      Comments: Abdomen with moderate distention and expected TTP. Staples in place, clean and dry without SOI    Musculoskeletal:      Right lower leg: No edema.      Left lower leg: No edema.   Skin:     General: Skin is warm.   Neurological:      Mental Status: She is alert and oriented to person, place, and time.   Psychiatric:         Mood and Affect: Mood is not anxious.         Speech: Speech normal.         Behavior: Behavior normal. Behavior is cooperative.       Significant Labs: All pertinent labs within the past 24 hours have been reviewed.  CBC:   Recent Labs   Lab 12/13/22  0553   WBC 9.74   HGB 10.8*   HCT 34.7*          CMP:   Recent Labs   Lab 12/13/22  0553      K 4.0   CL 99   CO2 28   GLU 87   BUN 9   CREATININE 0.7   CALCIUM 8.8   PROT 6.0   ALBUMIN 3.0*   BILITOT 1.8*   ALKPHOS 57   AST 21   ALT 13   ANIONGAP 11         Significant Imaging: I have reviewed all pertinent imaging results/findings within the past 24 hours.  CT: I have reviewed all pertinent results/findings within the past 24 hours and my personal findings are:  abdomen-soft tissue mass 11cm, peritoneal carinomatosis  Kub-satisfactory placement of ngt      Assessment/Plan:      * Bowel obstruction  POD3 S/p ex lap with ileocolonic diversion and biopsy of intra-abdominal mass  F/u surgery recs  Remove NGT  Clear liquids, advance as tolerated  Pain control      Class 2 severe obesity due to excess calories with serious comorbidity and body mass index (BMI) of 36.0 to 36.9 in adult  Body mass index is 36.96 kg/m². Elevation likely secondary to increased calorie intake and sedentary  lifestyle. Patient educated on morbidity and mortality in regards to elevated BMI and stressed importance of diet and exercise.   Plan:  -low fat/low calorie diet       Asthma  Patient with history of Asthma currently on inhalers but not home oxygen. Not presently in acute exacerbation and is without signs/symptoms of distress. Patient currently saturating  99% on 2 L/min. CXR revealed satisfactory nasogastric tube with clear lungs and normal appearance of pulmonary vasculature without evidence of pleural effusion or pneumothorax.   Plan:  -Continue home medications, titrate as needed  -Titrate oxygen requirements as needed  -Incentive spirometry   -Monitor pulse oximetry  -Duonebs prn          Type 2 diabetes mellitus, with long-term current use of insulin  Patient's FSGs are controlled on current medication regimen.  Last A1c reviewed-   Lab Results   Component Value Date    HGBA1C 5.9 (H) 12/11/2022     Most recent fingerstick glucose reviewed-   Recent Labs   Lab 12/11/22  0556   POCTGLUCOSE 87     Current correctional scale  low  titrate anti-hyperglycemic dose as follows-   Antihyperglycemics (From admission, onward)    Start     Stop Route Frequency Ordered    12/11/22 0321  insulin aspart U-100 pen 1-10 Units         -- SubQ Every 6 hours PRN 12/11/22 0222      Plan:  -SSI  -Continue home insulin at decreased dose and titrate as needed  -Hold oral hypoglycemics while patient is in the hospital  -Hypoglycemic protocol     Gastroesophageal reflux disease without esophagitis  Chronic. Stable. Currently asymptomatic. Home medications include PPI/Antacids as needed.  Plan:  -Continue PPI/Antacids as needed         Primary hypertension  Currently normotensive.  Plan:  -Optimize pain control   -Continue home medications, titrate as needed   -Monitor BP  -Low salt/cardiac diet when not NPO  -IV hydralazine prn for SBP>160 or DBP>90       Continue current regimen and adjust accordingly        Generalized abdominal  pain  Patient presented with worsening abdominal pain with associated nausea, vomiting, decreased p.o. intake, and constipation x2 weeks' duration and was found to have evidence of bowel obstruction associated with soft tissue mass.  CT abdomen/pelvis revealed an 11 cm soft tissue mass either arising from the cecum or distal small bowel consistent with malignancy causing a high-grade distal mechanical small bowel obstruction in addition to presence of peritoneal carcinomatosis.  Patient remains afebrile without leukocytosis and remains hemodynamically stable.  CMP, CBC, LFTs, and lipase within normal limits.  UA negative for UTI.  Patient status post NG tube insertion and currently on suction.  General surgery consulted and awaiting further evaluation/recommendations.  CEA and  ordered and pending prior to transfer.  Oncology consulted for malignant workup and to establish care.    Plan:  -Continue current pain regimen, titrate as needed  -advance diet per surgery          VTE Risk Mitigation (From admission, onward)         Ordered     enoxaparin injection 40 mg  Daily         12/10/22 2225     IP VTE HIGH RISK PATIENT  Once         12/10/22 2225     Place sequential compression device  Until discontinued         12/10/22 2225                Discharge Planning   FABIANA:      Code Status: Full Code   Is the patient medically ready for discharge?:     Reason for patient still in hospital (select all that apply): Patient trending condition, Treatment and Consult recommendations  Discharge Plan A: Home                  Jamie Horvath MD  Department of Hospital Medicine   '[facility] Surg

## 2022-12-14 NOTE — PROGRESS NOTES
[facility] Surg  Hematology/Oncology  Progress Note    Patient Name: Becky Steen  Admission Date: 12/10/2022  Hospital Length of Stay: 4 days  Code Status: Full Code     Subjective:     HPI:  62-year-old female history of abdominal pain over the last 2 weeks.  She states that his pain in her lower abdomen cramping in nature.  Patient that she did have a [facility] in distant past she is not sure when aware.  Patient presents with abdominal pain and bowel obstruction with multiple areas of air-fluid level with mass in right lower quadrant I was asked to see the patient for further evaluation ECOG status 2 NG tube in place      Interval History: patient c/o left wrist pain after repositioning in the bed. Tolerating CL w/o nausea/vomiting.     Oncology Treatment Plan:   [No matching plan found]    Medications:  Continuous Infusions:   lactated ringers 75 mL/hr at 12/13/22 1114     Scheduled Meds:   amLODIPine  5 mg Oral Daily    atorvastatin  20 mg Oral Daily    enoxaparin  40 mg Subcutaneous Daily    hydroCHLOROthiazide  12.5 mg Oral Daily    losartan  100 mg Oral Daily    mupirocin   Nasal BID    pantoprazole  40 mg Oral Daily    polyethylene glycol  17 g Oral Daily     PRN Meds:acetaminophen, albuterol-ipratropium, aluminum-magnesium hydroxide-simethicone, dextrose 10%, dextrose 10%, dextrose 10%, dextrose 10%, diphenhydrAMINE, glucagon (human recombinant), glucagon (human recombinant), glucose, glucose, insulin aspart U-100, melatonin, morphine, naloxone, naloxone, ondansetron, oxyCODONE, oxyCODONE, promethazine, sodium chloride 0.9%     Review of Systems   Constitutional:  Negative for activity change, appetite change, chills, fatigue, fever and unexpected weight change.   HENT:  Negative for congestion, mouth sores, nosebleeds, sore throat, trouble swallowing and voice change.    Eyes:  Negative for visual disturbance.   Respiratory:  Negative for cough, chest tightness, shortness  of breath and wheezing.    Cardiovascular:  Negative for chest pain, palpitations and leg swelling.   Gastrointestinal:  Negative for abdominal distention, abdominal pain, anal bleeding, blood in stool, constipation, diarrhea, nausea and vomiting.   Genitourinary:  Negative for difficulty urinating, dysuria and hematuria.   Musculoskeletal:  Negative for arthralgias, back pain and myalgias.        Left wrist pain   Skin:  Negative for pallor, rash and wound.   Neurological:  Negative for dizziness, syncope, weakness and headaches.   Hematological:  Negative for adenopathy. Does not bruise/bleed easily.   Psychiatric/Behavioral:  The patient is nervous/anxious.    Objective:     Vital Signs (Most Recent):  Temp: 98.2 °F (36.8 °C) (12/14/22 0704)  Pulse: 82 (12/14/22 0925)  Resp: 18 (12/14/22 0925)  BP: 126/72 (12/14/22 0955)  SpO2: (!) 94 % (12/14/22 0925)   Vital Signs (24h Range):  Temp:  [98.1 °F (36.7 °C)-99.1 °F (37.3 °C)] 98.2 °F (36.8 °C)  Pulse:  [] 82  Resp:  [16-19] 18  SpO2:  [91 %-95 %] 94 %  BP: ()/(55-72) 126/72     Weight: 107.3 kg (236 lb 8.9 oz)  Body mass index is 39.36 kg/m².  Body surface area is 2.22 meters squared.      Intake/Output Summary (Last 24 hours) at 12/14/2022 1021  Last data filed at 12/13/2022 1041  Gross per 24 hour   Intake 10 ml   Output --   Net 10 ml       Physical Exam  Vitals reviewed.   Constitutional:       Appearance: She is well-developed.   HENT:      Head: Normocephalic.      Right Ear: External ear normal.      Left Ear: External ear normal.      Nose: Nose normal.   Eyes:      General: Lids are normal. No scleral icterus.        Right eye: No discharge.         Left eye: No discharge.      Conjunctiva/sclera: Conjunctivae normal.   Neck:      Thyroid: No thyroid mass.   Cardiovascular:      Rate and Rhythm: Normal rate and regular rhythm.      Heart sounds: Normal heart sounds.   Pulmonary:      Effort: Pulmonary effort is normal. No respiratory distress.       Breath sounds: Normal breath sounds. No wheezing or rales.   Abdominal:      General: There is no distension.   Genitourinary:     Comments: deferred  Musculoskeletal:         General: Normal range of motion.      Cervical back: Normal range of motion.   Skin:     General: Skin is warm and dry.   Neurological:      Mental Status: She is alert and oriented to person, place, and time.   Psychiatric:         Speech: Speech normal.         Behavior: Behavior normal. Behavior is cooperative.         Thought Content: Thought content normal.       Significant Labs:   BMP:   Recent Labs   Lab 12/13/22  0553   GLU 87      K 4.0   CL 99   CO2 28   BUN 9   CREATININE 0.7   CALCIUM 8.8   , CBC:   Recent Labs   Lab 12/13/22  0553   WBC 9.74   HGB 10.8*   HCT 34.7*      , LFTs:   Recent Labs   Lab 12/13/22  0553   ALT 13   AST 21   ALKPHOS 57   BILITOT 1.8*   PROT 6.0   ALBUMIN 3.0*   , Urine Studies: No results for input(s): COLORU, APPEARANCEUA, PHUR, SPECGRAV, PROTEINUA, GLUCUA, KETONESU, BILIRUBINUA, OCCULTUA, NITRITE, UROBILINOGEN, LEUKOCYTESUR, RBCUA, WBCUA, BACTERIA, SQUAMEPITHEL, HYALINECASTS in the last 48 hours.    Invalid input(s): WRIGHTSUR, and All pertinent labs from the last 24 hours have been reviewed.    Diagnostic Results:  None    Assessment/Plan:     * Bowel obstruction  -s/p ex lap with ileocolonic diversion and biopsy of intra-abdominal mass  -pending pathology   -General surgery following  -Diet advancement per General surgery/primary team  -Plan for outpatient Oncology follow up for pathology results      Wrist pain, acute, left  -recommend left wrist x ray    Type 2 diabetes mellitus, with long-term current use of insulin  Cared for by primary care team    Primary hypertension  Cared for by primary care team    Generalized abdominal pain  Secondary to bowel obstruction and abdominal mass      Thank you for your consult. I will follow-up with patient. Please contact us if you have any  additional questions.     Esperanza Shin NP  Hematology/Oncology  [facility]  20 minutes of total time spent on the encounter, which includes face to face time and non-face to face time preparing to see the patient (eg, review of tests), Obtaining and/or reviewing separately obtained history, Documenting clinical information in the electronic or other health record, Independently interpreting results (not separately reported) and communicating results to the patient/family/caregiver, or Care coordination (not separately reported).

## 2022-12-14 NOTE — PROGRESS NOTES
[facility] Surg  General Surgery  Progress Note    Subjective:     History of Present Illness:  56 y/o female referred for right lower quadrant abdominal mass causing bowel obstruction. Patient reports constipation and decreased appetite. Denies any weight loss. Recently began having abdominal pain and bloating. CT in ER showing intraabdominal mass in right lower quadrant causing bowel obstruction.  Only previous abdominal/pelvic surgery was a tubal ligation.      Post-Op Info:  Procedure(s) (LRB):  LAPAROTOMY, EXPLORATORY (N/A)  CYSTOSCOPY, WITH URETERAL STENT INSERTION (Right)  BLOCK, TRANSVERSUS ABDOMINIS PLANE (N/A)  PYELOGRAM, RETROGRADE (Right)   3 Days Post-Op     Interval History:  Belching, no flatus or bowel movement, and well controlled    Medications:  Continuous Infusions:      Scheduled Meds:   amLODIPine  5 mg Oral Daily    atorvastatin  20 mg Oral Daily    enoxaparin  40 mg Subcutaneous Daily    hydroCHLOROthiazide  12.5 mg Oral Daily    losartan  100 mg Oral Daily    mupirocin   Nasal BID    pantoprazole  40 mg Oral Daily    polyethylene glycol  17 g Oral Daily     PRN Meds:acetaminophen, albuterol-ipratropium, aluminum-magnesium hydroxide-simethicone, dextrose 10%, dextrose 10%, dextrose 10%, dextrose 10%, diphenhydrAMINE, glucagon (human recombinant), glucagon (human recombinant), glucose, glucose, insulin aspart U-100, melatonin, morphine, naloxone, naloxone, ondansetron, oxyCODONE, oxyCODONE, promethazine, sodium chloride 0.9%     Review of patient's allergies indicates:  No Known Allergies  Objective:     Vital Signs (Most Recent):  Temp: 98.4 °F (36.9 °C) (12/14/22 1602)  Pulse: 87 (12/14/22 1602)  Resp: 18 (12/14/22 1602)  BP: (!) 154/97 (12/14/22 1602)  SpO2: (!) 93 % (12/14/22 1602)   Vital Signs (24h Range):  Temp:  [98.1 °F (36.7 °C)-98.7 °F (37.1 °C)] 98.4 °F (36.9 °C)  Pulse:  [] 87  Resp:  [16-19] 18  SpO2:  [91 %-97 %] 93 %  BP: ()/(55-97) 154/97     Weight:  107.3 kg (236 lb 8.9 oz)  Body mass index is 39.36 kg/m².    Intake/Output - Last 3 Shifts         12/12 0700  12/13 0659 12/13 0700 12/14 0659 12/14 0700  12/15 0659    I.V. (mL/kg) 25.9 (0.2) 24 (0.2)     IV Piggyback       Total Intake(mL/kg) 25.9 (0.2) 24 (0.2)     Urine (mL/kg/hr) 1000 (0.4)      Drains 700      Total Output 1700      Net -1674.1 +24            Urine Occurrence  1 x             Physical Exam  Vitals and nursing note reviewed.   Constitutional:       General: She is not in acute distress.     Appearance: She is well-developed.   HENT:      Head: Normocephalic and atraumatic.      Right Ear: External ear normal.      Left Ear: External ear normal.   Eyes:      Extraocular Movements: Extraocular movements intact.      Conjunctiva/sclera: Conjunctivae normal.   Cardiovascular:      Rate and Rhythm: Normal rate.   Pulmonary:      Effort: Pulmonary effort is normal. No respiratory distress.   Abdominal:      Comments: Abdomen with moderate distention and expected TTP. Staples in place, clean and dry without SOI   Musculoskeletal:      Cervical back: Neck supple.   Skin:     General: Skin is warm and dry.   Neurological:      Mental Status: She is alert and oriented to person, place, and time.   Psychiatric:         Behavior: Behavior normal.       Significant Labs:  I have reviewed all pertinent lab results within the past 24 hours.  CBC:   Recent Labs   Lab 12/13/22  0553   WBC 9.74   RBC 3.80*   HGB 10.8*   HCT 34.7*      MCV 91   MCH 28.4   MCHC 31.1*       CMP:   Recent Labs   Lab 12/13/22  0553   GLU 87   CALCIUM 8.8   ALBUMIN 3.0*   PROT 6.0      K 4.0   CO2 28   CL 99   BUN 9   CREATININE 0.7   ALKPHOS 57   ALT 13   AST 21   BILITOT 1.8*         Significant Diagnostics:  I have reviewed all pertinent imaging results/findings within the past 24 hours.  No new pertinent imaging    Assessment/Plan:     * Bowel obstruction  S/p ex lap with ileocolonic diversion and biopsy of  intra-abdominal mass    - Continue clear liquids, monitor for tolerance  - Await full return of bowel function, bowel regimen  - Encouraged OOB, ambulation  - DVT and GI prophylaxis  - Incentive spirometry  -p.o. pain medication with IV breakthrough    Class 2 severe obesity due to excess calories with serious comorbidity and body mass index (BMI) of 36.0 to 36.9 in adult  Dietary modifications    Type 2 diabetes mellitus, with long-term current use of insulin  Medical mgmt    Primary hypertension  Medical mgmt        Gustabo Davis MD  General Surgery  [facility]

## 2022-12-14 NOTE — SUBJECTIVE & OBJECTIVE
Interval History:  Belching, no flatus or bowel movement, and well controlled    Medications:  Continuous Infusions:      Scheduled Meds:   amLODIPine  5 mg Oral Daily    atorvastatin  20 mg Oral Daily    enoxaparin  40 mg Subcutaneous Daily    hydroCHLOROthiazide  12.5 mg Oral Daily    losartan  100 mg Oral Daily    mupirocin   Nasal BID    pantoprazole  40 mg Oral Daily    polyethylene glycol  17 g Oral Daily     PRN Meds:acetaminophen, albuterol-ipratropium, aluminum-magnesium hydroxide-simethicone, dextrose 10%, dextrose 10%, dextrose 10%, dextrose 10%, diphenhydrAMINE, glucagon (human recombinant), glucagon (human recombinant), glucose, glucose, insulin aspart U-100, melatonin, morphine, naloxone, naloxone, ondansetron, oxyCODONE, oxyCODONE, promethazine, sodium chloride 0.9%     Review of patient's allergies indicates:  No Known Allergies  Objective:     Vital Signs (Most Recent):  Temp: 98.4 °F (36.9 °C) (12/14/22 1602)  Pulse: 87 (12/14/22 1602)  Resp: 18 (12/14/22 1602)  BP: (!) 154/97 (12/14/22 1602)  SpO2: (!) 93 % (12/14/22 1602)   Vital Signs (24h Range):  Temp:  [98.1 °F (36.7 °C)-98.7 °F (37.1 °C)] 98.4 °F (36.9 °C)  Pulse:  [] 87  Resp:  [16-19] 18  SpO2:  [91 %-97 %] 93 %  BP: ()/(55-97) 154/97     Weight: 107.3 kg (236 lb 8.9 oz)  Body mass index is 39.36 kg/m².    Intake/Output - Last 3 Shifts         12/12 0700  12/13 0659 12/13 0700  12/14 0659 12/14 0700  12/15 0659    I.V. (mL/kg) 25.9 (0.2) 24 (0.2)     IV Piggyback       Total Intake(mL/kg) 25.9 (0.2) 24 (0.2)     Urine (mL/kg/hr) 1000 (0.4)      Drains 700      Total Output 1700      Net -1674.1 +24            Urine Occurrence  1 x             Physical Exam  Vitals and nursing note reviewed.   Constitutional:       General: She is not in acute distress.     Appearance: She is well-developed.   HENT:      Head: Normocephalic and atraumatic.      Right Ear: External ear normal.      Left Ear: External ear normal.   Eyes:       Extraocular Movements: Extraocular movements intact.      Conjunctiva/sclera: Conjunctivae normal.   Cardiovascular:      Rate and Rhythm: Normal rate.   Pulmonary:      Effort: Pulmonary effort is normal. No respiratory distress.   Abdominal:      Comments: Abdomen with moderate distention and expected TTP. Staples in place, clean and dry without SOI   Musculoskeletal:      Cervical back: Neck supple.   Skin:     General: Skin is warm and dry.   Neurological:      Mental Status: She is alert and oriented to person, place, and time.   Psychiatric:         Behavior: Behavior normal.       Significant Labs:  I have reviewed all pertinent lab results within the past 24 hours.  CBC:   Recent Labs   Lab 12/13/22  0553   WBC 9.74   RBC 3.80*   HGB 10.8*   HCT 34.7*      MCV 91   MCH 28.4   MCHC 31.1*       CMP:   Recent Labs   Lab 12/13/22  0553   GLU 87   CALCIUM 8.8   ALBUMIN 3.0*   PROT 6.0      K 4.0   CO2 28   CL 99   BUN 9   CREATININE 0.7   ALKPHOS 57   ALT 13   AST 21   BILITOT 1.8*         Significant Diagnostics:  I have reviewed all pertinent imaging results/findings within the past 24 hours.  No new pertinent imaging

## 2022-12-14 NOTE — PLAN OF CARE
Pt remains free from falls/injuries this shift. Safety precautions maintained. Pain managed with pain medication. Patient ambulating in fox. Pain tolerated. No BM today. No s/s of acute distress noted. Will continue to monitor. Chart check completed.

## 2022-12-14 NOTE — SUBJECTIVE & OBJECTIVE
Interval History: patient c/o left wrist pain after repositioning in the bed. Tolerating CL w/o nausea/vomiting.     Oncology Treatment Plan:   [No matching plan found]    Medications:  Continuous Infusions:   lactated ringers 75 mL/hr at 12/13/22 1114     Scheduled Meds:   amLODIPine  5 mg Oral Daily    atorvastatin  20 mg Oral Daily    enoxaparin  40 mg Subcutaneous Daily    hydroCHLOROthiazide  12.5 mg Oral Daily    losartan  100 mg Oral Daily    mupirocin   Nasal BID    pantoprazole  40 mg Oral Daily    polyethylene glycol  17 g Oral Daily     PRN Meds:acetaminophen, albuterol-ipratropium, aluminum-magnesium hydroxide-simethicone, dextrose 10%, dextrose 10%, dextrose 10%, dextrose 10%, diphenhydrAMINE, glucagon (human recombinant), glucagon (human recombinant), glucose, glucose, insulin aspart U-100, melatonin, morphine, naloxone, naloxone, ondansetron, oxyCODONE, oxyCODONE, promethazine, sodium chloride 0.9%     Review of Systems   Constitutional:  Negative for activity change, appetite change, chills, fatigue, fever and unexpected weight change.   HENT:  Negative for congestion, mouth sores, nosebleeds, sore throat, trouble swallowing and voice change.    Eyes:  Negative for visual disturbance.   Respiratory:  Negative for cough, chest tightness, shortness of breath and wheezing.    Cardiovascular:  Negative for chest pain, palpitations and leg swelling.   Gastrointestinal:  Negative for abdominal distention, abdominal pain, anal bleeding, blood in stool, constipation, diarrhea, nausea and vomiting.   Genitourinary:  Negative for difficulty urinating, dysuria and hematuria.   Musculoskeletal:  Negative for arthralgias, back pain and myalgias.        Left wrist pain   Skin:  Negative for pallor, rash and wound.   Neurological:  Negative for dizziness, syncope, weakness and headaches.   Hematological:  Negative for adenopathy. Does not bruise/bleed easily.   Psychiatric/Behavioral:  The patient is  nervous/anxious.    Objective:     Vital Signs (Most Recent):  Temp: 98.2 °F (36.8 °C) (12/14/22 0704)  Pulse: 82 (12/14/22 0925)  Resp: 18 (12/14/22 0925)  BP: 126/72 (12/14/22 0955)  SpO2: (!) 94 % (12/14/22 0925)   Vital Signs (24h Range):  Temp:  [98.1 °F (36.7 °C)-99.1 °F (37.3 °C)] 98.2 °F (36.8 °C)  Pulse:  [] 82  Resp:  [16-19] 18  SpO2:  [91 %-95 %] 94 %  BP: ()/(55-72) 126/72     Weight: 107.3 kg (236 lb 8.9 oz)  Body mass index is 39.36 kg/m².  Body surface area is 2.22 meters squared.      Intake/Output Summary (Last 24 hours) at 12/14/2022 1021  Last data filed at 12/13/2022 1041  Gross per 24 hour   Intake 10 ml   Output --   Net 10 ml       Physical Exam  Vitals reviewed.   Constitutional:       Appearance: She is well-developed.   HENT:      Head: Normocephalic.      Right Ear: External ear normal.      Left Ear: External ear normal.      Nose: Nose normal.   Eyes:      General: Lids are normal. No scleral icterus.        Right eye: No discharge.         Left eye: No discharge.      Conjunctiva/sclera: Conjunctivae normal.   Neck:      Thyroid: No thyroid mass.   Cardiovascular:      Rate and Rhythm: Normal rate and regular rhythm.      Heart sounds: Normal heart sounds.   Pulmonary:      Effort: Pulmonary effort is normal. No respiratory distress.      Breath sounds: Normal breath sounds. No wheezing or rales.   Abdominal:      General: There is no distension.   Genitourinary:     Comments: deferred  Musculoskeletal:         General: Normal range of motion.      Cervical back: Normal range of motion.   Skin:     General: Skin is warm and dry.   Neurological:      Mental Status: She is alert and oriented to person, place, and time.   Psychiatric:         Speech: Speech normal.         Behavior: Behavior normal. Behavior is cooperative.         Thought Content: Thought content normal.       Significant Labs:   BMP:   Recent Labs   Lab 12/13/22  0553   GLU 87      K 4.0   CL 99   CO2  28   BUN 9   CREATININE 0.7   CALCIUM 8.8   , CBC:   Recent Labs   Lab 12/13/22  0553   WBC 9.74   HGB 10.8*   HCT 34.7*      , LFTs:   Recent Labs   Lab 12/13/22  0553   ALT 13   AST 21   ALKPHOS 57   BILITOT 1.8*   PROT 6.0   ALBUMIN 3.0*   , Urine Studies: No results for input(s): COLORU, APPEARANCEUA, PHUR, SPECGRAV, PROTEINUA, GLUCUA, KETONESU, BILIRUBINUA, OCCULTUA, NITRITE, UROBILINOGEN, LEUKOCYTESUR, RBCUA, WBCUA, BACTERIA, SQUAMEPITHEL, HYALINECASTS in the last 48 hours.    Invalid input(s): WRIGHTSUR, and All pertinent labs from the last 24 hours have been reviewed.    Diagnostic Results:  None

## 2022-12-14 NOTE — SUBJECTIVE & OBJECTIVE
Review of Systems   Constitutional:  Negative for activity change, appetite change, fatigue and fever.   Respiratory:  Negative for shortness of breath.    Cardiovascular:  Negative for chest pain.   Gastrointestinal:  Positive for abdominal pain, diarrhea and nausea.   Neurological:  Negative for weakness.   Psychiatric/Behavioral:  Negative for agitation, behavioral problems, confusion, decreased concentration and dysphoric mood. The patient is not nervous/anxious.    Objective:     Vital Signs (Most Recent):  Temp: 98.2 °F (36.8 °C) (12/14/22 0704)  Pulse: 82 (12/14/22 0925)  Resp: 18 (12/14/22 0925)  BP: 126/72 (12/14/22 0955)  SpO2: (!) 94 % (12/14/22 0925) Vital Signs (24h Range):  Temp:  [98.1 °F (36.7 °C)-98.7 °F (37.1 °C)] 98.2 °F (36.8 °C)  Pulse:  [] 82  Resp:  [16-19] 18  SpO2:  [91 %-95 %] 94 %  BP: ()/(55-72) 126/72     Weight: 107.3 kg (236 lb 8.9 oz)  Body mass index is 39.36 kg/m².  No intake or output data in the 24 hours ending 12/14/22 1242     Physical Exam  Vitals and nursing note reviewed.   Constitutional:       General: She is not in acute distress.     Appearance: She is obese. She is ill-appearing. She is not toxic-appearing.   HENT:      Head: Normocephalic and atraumatic.   Cardiovascular:      Rate and Rhythm: Normal rate.   Pulmonary:      Effort: Pulmonary effort is normal. No respiratory distress.   Abdominal:      General: Bowel sounds are normal. There is distension.      Palpations: Abdomen is soft.      Tenderness: There is abdominal tenderness. There is no guarding.      Comments: Abdomen with moderate distention and expected TTP. Staples in place, clean and dry without SOI    Musculoskeletal:      Right lower leg: No edema.      Left lower leg: No edema.   Skin:     General: Skin is warm.   Neurological:      Mental Status: She is alert and oriented to person, place, and time.   Psychiatric:         Mood and Affect: Mood is not anxious.         Speech: Speech  normal.         Behavior: Behavior normal. Behavior is cooperative.       Significant Labs: All pertinent labs within the past 24 hours have been reviewed.  CBC:   Recent Labs   Lab 12/13/22  0553   WBC 9.74   HGB 10.8*   HCT 34.7*          CMP:   Recent Labs   Lab 12/13/22  0553      K 4.0   CL 99   CO2 28   GLU 87   BUN 9   CREATININE 0.7   CALCIUM 8.8   PROT 6.0   ALBUMIN 3.0*   BILITOT 1.8*   ALKPHOS 57   AST 21   ALT 13   ANIONGAP 11         Significant Imaging: I have reviewed all pertinent imaging results/findings within the past 24 hours.  CT: I have reviewed all pertinent results/findings within the past 24 hours and my personal findings are:  abdomen-soft tissue mass 11cm, peritoneal carinomatosis  Kub-satisfactory placement of ngt

## 2022-12-15 LAB
ANION GAP SERPL CALC-SCNC: 15 MMOL/L (ref 8–16)
BASOPHILS # BLD AUTO: 0.03 K/UL (ref 0–0.2)
BASOPHILS NFR BLD: 0.3 % (ref 0–1.9)
BUN SERPL-MCNC: 11 MG/DL (ref 6–20)
CALCIUM SERPL-MCNC: 9.5 MG/DL (ref 8.7–10.5)
CHLORIDE SERPL-SCNC: 96 MMOL/L (ref 95–110)
CO2 SERPL-SCNC: 21 MMOL/L (ref 23–29)
CREAT SERPL-MCNC: 0.7 MG/DL (ref 0.5–1.4)
DIFFERENTIAL METHOD: ABNORMAL
EOSINOPHIL # BLD AUTO: 0.1 K/UL (ref 0–0.5)
EOSINOPHIL NFR BLD: 1.4 % (ref 0–8)
ERYTHROCYTE [DISTWIDTH] IN BLOOD BY AUTOMATED COUNT: 14.2 % (ref 11.5–14.5)
EST. GFR  (NO RACE VARIABLE): >60 ML/MIN/1.73 M^2
GLUCOSE SERPL-MCNC: 92 MG/DL (ref 70–110)
HCT VFR BLD AUTO: 34.8 % (ref 37–48.5)
HGB BLD-MCNC: 11 G/DL (ref 12–16)
IMM GRANULOCYTES # BLD AUTO: 0.03 K/UL (ref 0–0.04)
IMM GRANULOCYTES NFR BLD AUTO: 0.3 % (ref 0–0.5)
LYMPHOCYTES # BLD AUTO: 1.1 K/UL (ref 1–4.8)
LYMPHOCYTES NFR BLD: 12.4 % (ref 18–48)
MCH RBC QN AUTO: 28.1 PG (ref 27–31)
MCHC RBC AUTO-ENTMCNC: 31.6 G/DL (ref 32–36)
MCV RBC AUTO: 89 FL (ref 82–98)
MONOCYTES # BLD AUTO: 1.1 K/UL (ref 0.3–1)
MONOCYTES NFR BLD: 12.1 % (ref 4–15)
NEUTROPHILS # BLD AUTO: 6.5 K/UL (ref 1.8–7.7)
NEUTROPHILS NFR BLD: 73.5 % (ref 38–73)
NRBC BLD-RTO: 0 /100 WBC
PLATELET # BLD AUTO: 338 K/UL (ref 150–450)
PMV BLD AUTO: 10.2 FL (ref 9.2–12.9)
POCT GLUCOSE: 79 MG/DL (ref 70–110)
POCT GLUCOSE: 82 MG/DL (ref 70–110)
POCT GLUCOSE: 82 MG/DL (ref 70–110)
POCT GLUCOSE: 90 MG/DL (ref 70–110)
POCT GLUCOSE: 90 MG/DL (ref 70–110)
POTASSIUM SERPL-SCNC: 4 MMOL/L (ref 3.5–5.1)
RBC # BLD AUTO: 3.91 M/UL (ref 4–5.4)
SODIUM SERPL-SCNC: 132 MMOL/L (ref 136–145)
WBC # BLD AUTO: 8.79 K/UL (ref 3.9–12.7)

## 2022-12-15 PROCEDURE — 25000003 PHARM REV CODE 250: Performed by: FAMILY MEDICINE

## 2022-12-15 PROCEDURE — 25000003 PHARM REV CODE 250: Performed by: SURGERY

## 2022-12-15 PROCEDURE — 94761 N-INVAS EAR/PLS OXIMETRY MLT: CPT

## 2022-12-15 PROCEDURE — 25000003 PHARM REV CODE 250: Performed by: NURSE PRACTITIONER

## 2022-12-15 PROCEDURE — 63600175 PHARM REV CODE 636 W HCPCS: Performed by: SURGERY

## 2022-12-15 PROCEDURE — 80048 BASIC METABOLIC PNL TOTAL CA: CPT | Performed by: SURGERY

## 2022-12-15 PROCEDURE — 11000001 HC ACUTE MED/SURG PRIVATE ROOM

## 2022-12-15 PROCEDURE — 99232 SBSQ HOSP IP/OBS MODERATE 35: CPT | Mod: ,,, | Performed by: INTERNAL MEDICINE

## 2022-12-15 PROCEDURE — 99232 PR SUBSEQUENT HOSPITAL CARE,LEVL II: ICD-10-PCS | Mod: ,,, | Performed by: INTERNAL MEDICINE

## 2022-12-15 PROCEDURE — 25000003 PHARM REV CODE 250

## 2022-12-15 PROCEDURE — 85025 COMPLETE CBC W/AUTO DIFF WBC: CPT | Performed by: SURGERY

## 2022-12-15 PROCEDURE — 36415 COLL VENOUS BLD VENIPUNCTURE: CPT | Performed by: SURGERY

## 2022-12-15 RX ORDER — POLYETHYLENE GLYCOL 3350 17 G/17G
17 POWDER, FOR SOLUTION ORAL 2 TIMES DAILY
Status: DISCONTINUED | OUTPATIENT
Start: 2022-12-15 | End: 2022-12-19 | Stop reason: HOSPADM

## 2022-12-15 RX ADMIN — DIPHENHYDRAMINE HYDROCHLORIDE 25 MG: 25 CAPSULE ORAL at 11:12

## 2022-12-15 RX ADMIN — POLYETHYLENE GLYCOL 3350 17 G: 17 POWDER, FOR SOLUTION ORAL at 08:12

## 2022-12-15 RX ADMIN — ATORVASTATIN CALCIUM 20 MG: 10 TABLET, FILM COATED ORAL at 09:12

## 2022-12-15 RX ADMIN — OXYCODONE HYDROCHLORIDE 10 MG: 5 TABLET ORAL at 06:12

## 2022-12-15 RX ADMIN — OXYCODONE HYDROCHLORIDE 10 MG: 5 TABLET ORAL at 11:12

## 2022-12-15 RX ADMIN — DIPHENHYDRAMINE HYDROCHLORIDE 25 MG: 25 CAPSULE ORAL at 06:12

## 2022-12-15 RX ADMIN — OXYCODONE HYDROCHLORIDE 10 MG: 5 TABLET ORAL at 05:12

## 2022-12-15 RX ADMIN — HYDROCHLOROTHIAZIDE 12.5 MG: 12.5 TABLET ORAL at 09:12

## 2022-12-15 RX ADMIN — MUPIROCIN: 20 OINTMENT TOPICAL at 08:12

## 2022-12-15 RX ADMIN — ENOXAPARIN SODIUM 40 MG: 40 INJECTION SUBCUTANEOUS at 04:12

## 2022-12-15 RX ADMIN — PANTOPRAZOLE SODIUM 40 MG: 40 TABLET, DELAYED RELEASE ORAL at 09:12

## 2022-12-15 RX ADMIN — POLYETHYLENE GLYCOL 3350 17 G: 17 POWDER, FOR SOLUTION ORAL at 09:12

## 2022-12-15 RX ADMIN — MUPIROCIN: 20 OINTMENT TOPICAL at 09:12

## 2022-12-15 NOTE — ASSESSMENT & PLAN NOTE
-s/p ex lap with ileocolonic diversion and biopsy of intra-abdominal mass  -pending pathology   -General surgery following  -Diet advancement per General surgery/primary team  -awaiting return of bowel function. Encourage ambulation. Encourage OOB to mireille  -Plan for outpatient Oncology follow up for pathology results

## 2022-12-15 NOTE — SUBJECTIVE & OBJECTIVE
Interval History: No acute events overnight. Wrist xray unremarkable. She notes decreased pain today. Tolerating CL diet. No flatus. Awaiting return of bowel function. Abdominal mass biopsy pending    Oncology Treatment Plan:   [No matching plan found]    Medications:  Continuous Infusions:      Scheduled Meds:   amLODIPine  5 mg Oral Daily    atorvastatin  20 mg Oral Daily    enoxaparin  40 mg Subcutaneous Daily    hydroCHLOROthiazide  12.5 mg Oral Daily    losartan  100 mg Oral Daily    mupirocin   Nasal BID    pantoprazole  40 mg Oral Daily    polyethylene glycol  17 g Oral BID     PRN Meds:acetaminophen, albuterol-ipratropium, aluminum-magnesium hydroxide-simethicone, dextrose 10%, dextrose 10%, dextrose 10%, dextrose 10%, diphenhydrAMINE, glucagon (human recombinant), glucagon (human recombinant), glucose, glucose, insulin aspart U-100, melatonin, morphine, naloxone, naloxone, ondansetron, oxyCODONE, oxyCODONE, promethazine, sodium chloride 0.9%     Review of Systems   Constitutional:  Positive for activity change. Negative for appetite change, chills, fatigue, fever and unexpected weight change.   HENT:  Negative for congestion, mouth sores, nosebleeds, sore throat, trouble swallowing and voice change.    Eyes:  Negative for visual disturbance.   Respiratory:  Negative for cough, chest tightness, shortness of breath and wheezing.    Cardiovascular:  Negative for chest pain, palpitations and leg swelling.   Gastrointestinal:  Negative for abdominal distention, abdominal pain, anal bleeding, blood in stool, constipation, diarrhea, nausea and vomiting.   Genitourinary:  Negative for difficulty urinating, dysuria and hematuria.   Musculoskeletal:  Negative for arthralgias, back pain and myalgias.   Skin:  Negative for pallor, rash and wound.   Neurological:  Negative for dizziness, syncope, weakness and headaches.   Hematological:  Negative for adenopathy. Does not bruise/bleed easily.   Psychiatric/Behavioral:   The patient is nervous/anxious.    Objective:     Vital Signs (Most Recent):  Temp: 97.8 °F (36.6 °C) (12/15/22 0720)  Pulse: 82 (12/15/22 0720)  Resp: 18 (12/15/22 0720)  BP: (!) 105/56 (12/15/22 0720)  SpO2: (!) 91 % (12/15/22 0720)   Vital Signs (24h Range):  Temp:  [97.8 °F (36.6 °C)-98.9 °F (37.2 °C)] 97.8 °F (36.6 °C)  Pulse:  [82-92] 82  Resp:  [16-18] 18  SpO2:  [91 %-97 %] 91 %  BP: ()/(53-97) 105/56     Weight: 106.9 kg (235 lb 10.8 oz)  Body mass index is 39.22 kg/m².  Body surface area is 2.21 meters squared.      Intake/Output Summary (Last 24 hours) at 12/15/2022 1034  Last data filed at 12/14/2022 1821  Gross per 24 hour   Intake 3910.06 ml   Output --   Net 3910.06 ml         Physical Exam  Vitals reviewed.   Constitutional:       Appearance: She is well-developed.   HENT:      Head: Normocephalic.      Right Ear: External ear normal.      Left Ear: External ear normal.      Nose: Nose normal.   Eyes:      General: Lids are normal. No scleral icterus.        Right eye: No discharge.         Left eye: No discharge.      Conjunctiva/sclera: Conjunctivae normal.   Neck:      Thyroid: No thyroid mass.   Cardiovascular:      Rate and Rhythm: Normal rate and regular rhythm.      Heart sounds: Normal heart sounds.   Pulmonary:      Effort: Pulmonary effort is normal. No respiratory distress.      Breath sounds: Normal breath sounds. No wheezing or rales.   Abdominal:      General: There is no distension.   Genitourinary:     Comments: deferred  Musculoskeletal:         General: Normal range of motion.      Cervical back: Normal range of motion.   Skin:     General: Skin is warm and dry.   Neurological:      Mental Status: She is alert and oriented to person, place, and time.   Psychiatric:         Speech: Speech normal.         Behavior: Behavior normal. Behavior is cooperative.         Thought Content: Thought content normal.       Significant Labs:   BMP:   No results for input(s): GLU, NA, K, CL,  CO2, BUN, CREATININE, CALCIUM, MG in the last 48 hours.  , CBC:   No results for input(s): WBC, HGB, HCT, PLT in the last 48 hours.  , LFTs:   No results for input(s): ALT, AST, ALKPHOS, BILITOT, PROT, ALBUMIN in the last 48 hours.  , Urine Studies: No results for input(s): COLORU, APPEARANCEUA, PHUR, SPECGRAV, PROTEINUA, GLUCUA, KETONESU, BILIRUBINUA, OCCULTUA, NITRITE, UROBILINOGEN, LEUKOCYTESUR, RBCUA, WBCUA, BACTERIA, SQUAMEPITHEL, HYALINECASTS in the last 48 hours.    Invalid input(s): WRIGHTSUR, and All pertinent labs from the last 24 hours have been reviewed.    Diagnostic Results:  None

## 2022-12-15 NOTE — ASSESSMENT & PLAN NOTE
POD4 s/p ex lap with ileocolonic diversion and biopsy of intra-abdominal mass  F/u surgery recs  Clear liquids, advance as tolerated  Pain control

## 2022-12-15 NOTE — ASSESSMENT & PLAN NOTE
S/p ex lap with ileocolonic diversion and biopsy of intra-abdominal mass    - Continue clear liquids, monitor for tolerance  - Await full return of bowel function, bowel regimen/ miralax bid  - Encouraged OOB, ambulation  - DVT and GI prophylaxis  - Incentive spirometry  - p.o. pain medication with IV breakthrough

## 2022-12-15 NOTE — PLAN OF CARE
Patient remains free of falls and injuries during shift. Pt is alert and oriented x4, pleasant and cooperative. Pain managed with ordered meds. Pt still has not had a BM. Bowel sounds are hypoactive. Pt ambulated around nurses station x2 with stand-by assistance. Chart check complete. Active orders reviewed. Will continue plan of care.

## 2022-12-15 NOTE — SUBJECTIVE & OBJECTIVE
Review of Systems   Constitutional:  Negative for activity change, appetite change, fatigue and fever.   Respiratory:  Negative for shortness of breath.    Cardiovascular:  Negative for chest pain.   Gastrointestinal:  Positive for abdominal pain, diarrhea and nausea.   Neurological:  Negative for weakness.   Psychiatric/Behavioral:  Negative for agitation, behavioral problems, confusion, decreased concentration and dysphoric mood. The patient is not nervous/anxious.    Objective:     Vital Signs (Most Recent):  Temp: 97.8 °F (36.6 °C) (12/15/22 1127)  Pulse: 80 (12/15/22 1127)  Resp: 18 (12/15/22 1127)  BP: (!) 113/56 (12/15/22 1127)  SpO2: 95 % (12/15/22 1127) Vital Signs (24h Range):  Temp:  [97.8 °F (36.6 °C)-98.9 °F (37.2 °C)] 97.8 °F (36.6 °C)  Pulse:  [80-92] 80  Resp:  [16-18] 18  SpO2:  [91 %-95 %] 95 %  BP: ()/(53-97) 113/56     Weight: 106.9 kg (235 lb 10.8 oz)  Body mass index is 39.22 kg/m².    Intake/Output Summary (Last 24 hours) at 12/15/2022 1300  Last data filed at 12/14/2022 1821  Gross per 24 hour   Intake 3910.06 ml   Output --   Net 3910.06 ml        Physical Exam  Vitals and nursing note reviewed.   Constitutional:       General: She is not in acute distress.     Appearance: She is obese. She is not ill-appearing or toxic-appearing.   HENT:      Head: Normocephalic and atraumatic.   Cardiovascular:      Rate and Rhythm: Normal rate.   Pulmonary:      Effort: Pulmonary effort is normal. No respiratory distress.   Abdominal:      General: Bowel sounds are normal. There is no distension.      Palpations: Abdomen is soft.      Tenderness: There is abdominal tenderness. There is no guarding.      Comments: Abdomen with minimal distention and expected TTP. Staples in place, clean and dry without SOI    Musculoskeletal:      Right lower leg: No edema.      Left lower leg: No edema.   Skin:     General: Skin is warm.   Neurological:      Mental Status: She is alert and oriented to person, place,  and time.   Psychiatric:         Mood and Affect: Mood is not anxious.         Speech: Speech normal.         Behavior: Behavior normal. Behavior is cooperative.       Significant Labs: All pertinent labs within the past 24 hours have been reviewed.  CBC:   Recent Labs   Lab 12/15/22  1024   WBC 8.79   HGB 11.0*   HCT 34.8*          CMP:   Recent Labs   Lab 12/15/22  1024   *   K 4.0   CL 96   CO2 21*   GLU 92   BUN 11   CREATININE 0.7   CALCIUM 9.5   ANIONGAP 15         Significant Imaging: I have reviewed all pertinent imaging results/findings within the past 24 hours.  CT: I have reviewed all pertinent results/findings within the past 24 hours and my personal findings are:  abdomen-soft tissue mass 11cm, peritoneal carinomatosis  Kub-satisfactory placement of ngt

## 2022-12-15 NOTE — PROGRESS NOTES
[facility] Medicine  Progress Note    Patient Name: Becky Steen  MRN: [id number]  Patient Class: IP- Inpatient   Admission Date: 12/10/2022  Length of Stay: 5 days  Attending Physician: Jamie Horvath MD  Primary Care Provider: SANDEEP Perdue        Subjective:     Principal Problem:Bowel obstruction        HPI:  Becky Steen is a 57 y.o. female with a PMH  has a past medical history of Asthma, Diabetes mellitus, GERD (gastroesophageal reflux disease), HLD (hyperlipidemia), and Hypertension. who presented as a transfer from [facility] for higher level of care after patient presented with complaints of generalized abdominal pain with associated nausea and vomiting and was found to have evidence of a soft tissue mass arising from the cecum/distal small bowel causing bowel obstruction noted on CT abdomen/pelvis. Patient reported acute onset and progressively worsening generalized abdominal pain which began approximately 2 weeks prior to admission. She reported pain is intermittent in frequency and described as a sharp/labored like pains throughout her entire abdomen, currently rated 0/10 in severity but worsens to 10/10 in severity with no known alleviating or aggravating factors noted.  Associated symptoms included nausea, vomiting, constipation, and decreased oral intake but denied endorsing any fever, chills, sweats, chest pain, shortness a breath, dysuria, hematuria, hematochezia, melena, or neurological deficits.  Patient reported having small bowel movement yesterday with intermittent bowel movements throughout the week reported loose/watery nature.  Patient reported last colonoscopy was within [facility] and was told to come back 10 years later for repeat.  Patient follows Dr. Alcala at [facility] for OBGYN with last reported mg obtain last year and Pap smear few months ago prior to admission and reported both were normal.  Patient reports history of skin  cancer in her father but is unaware of any other known family history of cancers.  Prior to onset of symptoms, patient reported being in her usual state of health no other concerns or complaints.  All other review of systems negative except as noted above.  Patient admitted to Hospital Medicine for continued medical management of bowel obstruction and further cancer workup. General Surgery aware of patient and awaiting further evaluation in the morning.     PCP: Kerry Aguilar        Overview/Hospital Course:  Admitted for right lower quadrant abdominal mass causing bowel obstruction. POD4 S/p ex lap with ileocolonic diversion and biopsy of intra-abdominal mass. Tolerating clear liquid diet, denies abdominal pain, nausea, vomiting. Awaiting return of bowel function, denies flatus, bowel movements      Review of Systems   Constitutional:  Negative for activity change, appetite change, fatigue and fever.   Respiratory:  Negative for shortness of breath.    Cardiovascular:  Negative for chest pain.   Gastrointestinal:  Positive for abdominal pain, diarrhea and nausea.   Neurological:  Negative for weakness.   Psychiatric/Behavioral:  Negative for agitation, behavioral problems, confusion, decreased concentration and dysphoric mood. The patient is not nervous/anxious.    Objective:     Vital Signs (Most Recent):  Temp: 97.8 °F (36.6 °C) (12/15/22 1127)  Pulse: 80 (12/15/22 1127)  Resp: 18 (12/15/22 1127)  BP: (!) 113/56 (12/15/22 1127)  SpO2: 95 % (12/15/22 1127) Vital Signs (24h Range):  Temp:  [97.8 °F (36.6 °C)-98.9 °F (37.2 °C)] 97.8 °F (36.6 °C)  Pulse:  [80-92] 80  Resp:  [16-18] 18  SpO2:  [91 %-95 %] 95 %  BP: ()/(53-97) 113/56     Weight: 106.9 kg (235 lb 10.8 oz)  Body mass index is 39.22 kg/m².    Intake/Output Summary (Last 24 hours) at 12/15/2022 1300  Last data filed at 12/14/2022 1821  Gross per 24 hour   Intake 3910.06 ml   Output --   Net 3910.06 ml        Physical Exam  Vitals and nursing note  reviewed.   Constitutional:       General: She is not in acute distress.     Appearance: She is obese. She is not ill-appearing or toxic-appearing.   HENT:      Head: Normocephalic and atraumatic.   Cardiovascular:      Rate and Rhythm: Normal rate.   Pulmonary:      Effort: Pulmonary effort is normal. No respiratory distress.   Abdominal:      General: Bowel sounds are normal. There is no distension.      Palpations: Abdomen is soft.      Tenderness: There is abdominal tenderness. There is no guarding.      Comments: Abdomen with minimal distention and expected TTP. Staples in place, clean and dry without SOI    Musculoskeletal:      Right lower leg: No edema.      Left lower leg: No edema.   Skin:     General: Skin is warm.   Neurological:      Mental Status: She is alert and oriented to person, place, and time.   Psychiatric:         Mood and Affect: Mood is not anxious.         Speech: Speech normal.         Behavior: Behavior normal. Behavior is cooperative.       Significant Labs: All pertinent labs within the past 24 hours have been reviewed.  CBC:   Recent Labs   Lab 12/15/22  1024   WBC 8.79   HGB 11.0*   HCT 34.8*          CMP:   Recent Labs   Lab 12/15/22  1024   *   K 4.0   CL 96   CO2 21*   GLU 92   BUN 11   CREATININE 0.7   CALCIUM 9.5   ANIONGAP 15         Significant Imaging: I have reviewed all pertinent imaging results/findings within the past 24 hours.  CT: I have reviewed all pertinent results/findings within the past 24 hours and my personal findings are:  abdomen-soft tissue mass 11cm, peritoneal carinomatosis  Kub-satisfactory placement of ngt      Assessment/Plan:      * Bowel obstruction  POD4 s/p ex lap with ileocolonic diversion and biopsy of intra-abdominal mass  F/u surgery recs  Clear liquids, advance as tolerated  Pain control      Class 2 severe obesity due to excess calories with serious comorbidity and body mass index (BMI) of 36.0 to 36.9 in adult  Body mass index is  36.96 kg/m². Elevation likely secondary to increased calorie intake and sedentary lifestyle. Patient educated on morbidity and mortality in regards to elevated BMI and stressed importance of diet and exercise.   Plan:  -low fat/low calorie diet       Asthma  Patient with history of Asthma currently on inhalers but not home oxygen. Not presently in acute exacerbation and is without signs/symptoms of distress. Patient currently saturating  99% on 2 L/min. CXR revealed satisfactory nasogastric tube with clear lungs and normal appearance of pulmonary vasculature without evidence of pleural effusion or pneumothorax.   Plan:  -Continue home medications, titrate as needed  -Titrate oxygen requirements as needed  -Incentive spirometry   -Monitor pulse oximetry  -Duonebs prn          Type 2 diabetes mellitus, with long-term current use of insulin  Patient's FSGs are controlled on current medication regimen.  Last A1c reviewed-   Lab Results   Component Value Date    HGBA1C 5.9 (H) 12/11/2022     Most recent fingerstick glucose reviewed-   Recent Labs   Lab 12/11/22  0556   POCTGLUCOSE 87     Current correctional scale  low  titrate anti-hyperglycemic dose as follows-   Antihyperglycemics (From admission, onward)    Start     Stop Route Frequency Ordered    12/11/22 0321  insulin aspart U-100 pen 1-10 Units         -- SubQ Every 6 hours PRN 12/11/22 0222      Plan:  -SSI  -Continue home insulin at decreased dose and titrate as needed  -Hold oral hypoglycemics while patient is in the hospital  -Hypoglycemic protocol     Gastroesophageal reflux disease without esophagitis  Chronic. Stable. Currently asymptomatic. Home medications include PPI/Antacids as needed.  Plan:  -Continue PPI/Antacids as needed         Primary hypertension  Currently normotensive.  Plan:  -Optimize pain control   -Continue home medications, titrate as needed   -Monitor BP  -Low salt/cardiac diet when not NPO  -IV hydralazine prn for SBP>160 or DBP>90        Continue current regimen and adjust accordingly        Generalized abdominal pain  Patient presented with worsening abdominal pain with associated nausea, vomiting, decreased p.o. intake, and constipation x2 weeks' duration and was found to have evidence of bowel obstruction associated with soft tissue mass.  CT abdomen/pelvis revealed an 11 cm soft tissue mass either arising from the cecum or distal small bowel consistent with malignancy causing a high-grade distal mechanical small bowel obstruction in addition to presence of peritoneal carcinomatosis.  Patient remains afebrile without leukocytosis and remains hemodynamically stable.  CMP, CBC, LFTs, and lipase within normal limits.  UA negative for UTI.  Patient status post NG tube insertion and currently on suction.  General surgery consulted and awaiting further evaluation/recommendations.  CEA and  ordered and pending prior to transfer.  Oncology consulted for malignant workup and to establish care.    Plan:  -Continue current pain regimen, titrate as needed  -advance diet per surgery            VTE Risk Mitigation (From admission, onward)         Ordered     enoxaparin injection 40 mg  Daily         12/10/22 2225     IP VTE HIGH RISK PATIENT  Once         12/10/22 2225     Place sequential compression device  Until discontinued         12/10/22 2225                Discharge Planning   FABIANA:      Code Status: Full Code   Is the patient medically ready for discharge?:     Reason for patient still in hospital (select all that apply): Patient trending condition, Treatment and Consult recommendations  Discharge Plan A: Home                  Jamie Horvath MD  Department of Hospital Medicine   [facility]

## 2022-12-15 NOTE — PROGRESS NOTES
[facility] Surg  General Surgery  Progress Note    Subjective:     History of Present Illness:  58 y/o female referred for right lower quadrant abdominal mass causing bowel obstruction. Patient reports constipation and decreased appetite. Denies any weight loss. Recently began having abdominal pain and bloating. CT in ER showing intraabdominal mass in right lower quadrant causing bowel obstruction.  Only previous abdominal/pelvic surgery was a tubal ligation.      Post-Op Info:  Procedure(s) (LRB):  LAPAROTOMY, EXPLORATORY (N/A)  CYSTOSCOPY, WITH URETERAL STENT INSERTION (Right)  BLOCK, TRANSVERSUS ABDOMINIS PLANE (N/A)  PYELOGRAM, RETROGRADE (Right)   4 Days Post-Op     Interval History:  Tolerating clear liquids, belching improved, no flatus or bowel movement, pain well controlled, ambulating    Medications:  Continuous Infusions:      Scheduled Meds:   amLODIPine  5 mg Oral Daily    atorvastatin  20 mg Oral Daily    enoxaparin  40 mg Subcutaneous Daily    hydroCHLOROthiazide  12.5 mg Oral Daily    losartan  100 mg Oral Daily    mupirocin   Nasal BID    pantoprazole  40 mg Oral Daily    polyethylene glycol  17 g Oral BID     PRN Meds:acetaminophen, albuterol-ipratropium, aluminum-magnesium hydroxide-simethicone, dextrose 10%, dextrose 10%, dextrose 10%, dextrose 10%, diphenhydrAMINE, glucagon (human recombinant), glucagon (human recombinant), glucose, glucose, insulin aspart U-100, melatonin, morphine, naloxone, naloxone, ondansetron, oxyCODONE, oxyCODONE, promethazine, sodium chloride 0.9%     Review of patient's allergies indicates:  No Known Allergies  Objective:     Vital Signs (Most Recent):  Temp: 97.8 °F (36.6 °C) (12/15/22 0720)  Pulse: 82 (12/15/22 0720)  Resp: 18 (12/15/22 0720)  BP: (!) 105/56 (12/15/22 0720)  SpO2: (!) 91 % (12/15/22 0720)   Vital Signs (24h Range):  Temp:  [97.8 °F (36.6 °C)-98.9 °F (37.2 °C)] 97.8 °F (36.6 °C)  Pulse:  [82-92] 82  Resp:  [16-18] 18  SpO2:  [91 %-97 %] 91  %  BP: ()/(53-97) 105/56     Weight: 106.9 kg (235 lb 10.8 oz)  Body mass index is 39.22 kg/m².    Intake/Output - Last 3 Shifts         12/13 0700  12/14 0659 12/14 0700  12/15 0659 12/15 0700 12/16 0659    I.V. (mL/kg) 24 (0.2) 3910.1 (36.6)     Total Intake(mL/kg) 24 (0.2) 3910.1 (36.6)     Urine (mL/kg/hr)       Drains       Total Output       Net +24 +3910.1            Urine Occurrence 1 x 3 x             Physical Exam  Vitals and nursing note reviewed.   Constitutional:       General: She is not in acute distress.     Appearance: She is well-developed.   HENT:      Head: Normocephalic and atraumatic.      Right Ear: External ear normal.      Left Ear: External ear normal.   Eyes:      Extraocular Movements: Extraocular movements intact.      Conjunctiva/sclera: Conjunctivae normal.   Cardiovascular:      Rate and Rhythm: Normal rate.   Pulmonary:      Effort: Pulmonary effort is normal. No respiratory distress.   Abdominal:      General: There is distension.      Tenderness: There is abdominal tenderness (incisional).      Comments: Abdomen with moderate distention and expected TTP. Staples in place, clean and dry without SOI   Musculoskeletal:      Cervical back: Neck supple.   Skin:     General: Skin is warm and dry.   Neurological:      Mental Status: She is alert and oriented to person, place, and time.   Psychiatric:         Behavior: Behavior normal.       Significant Labs:  I have reviewed all pertinent lab results within the past 24 hours.  CBC:   Recent Labs   Lab 12/13/22  0553   WBC 9.74   RBC 3.80*   HGB 10.8*   HCT 34.7*      MCV 91   MCH 28.4   MCHC 31.1*       CMP:   Recent Labs   Lab 12/13/22  0553   GLU 87   CALCIUM 8.8   ALBUMIN 3.0*   PROT 6.0      K 4.0   CO2 28   CL 99   BUN 9   CREATININE 0.7   ALKPHOS 57   ALT 13   AST 21   BILITOT 1.8*         Significant Diagnostics:  I have reviewed all pertinent imaging results/findings within the past 24 hours.  No new pertinent  imaging    Assessment/Plan:     * Bowel obstruction  S/p ex lap with ileocolonic diversion and biopsy of intra-abdominal mass    - Continue clear liquids, monitor for tolerance  - Await full return of bowel function, bowel regimen/ miralax bid  - Encouraged OOB, ambulation  - DVT and GI prophylaxis  - Incentive spirometry  - p.o. pain medication with IV breakthrough    Class 2 severe obesity due to excess calories with serious comorbidity and body mass index (BMI) of 36.0 to 36.9 in adult  Dietary modifications    Type 2 diabetes mellitus, with long-term current use of insulin  Medical mgmt    Primary hypertension  Medical mgmt        Gustabo Davis MD  General Surgery  [facility]

## 2022-12-15 NOTE — PROGRESS NOTES
[facility] Surg  Hematology/Oncology  Progress Note    Patient Name: Becky Steen  Admission Date: 12/10/2022  Hospital Length of Stay: 5 days  Code Status: Full Code     Subjective:     HPI:  62-year-old female history of abdominal pain over the last 2 weeks.  She states that his pain in her lower abdomen cramping in nature.  Patient that she did have a [facility]oscopy [facility] in distant past she is not sure when aware.  Patient presents with abdominal pain and bowel obstruction with multiple areas of air-fluid level with mass in right lower quadrant I was asked to see the patient for further evaluation ECOG status 2 NG tube in place      Interval History: No acute events overnight. Wrist xray unremarkable. She notes decreased pain today. Tolerating CL diet. No flatus. Awaiting return of bowel function. Abdominal mass biopsy pending    Oncology Treatment Plan:   [No matching plan found]    Medications:  Continuous Infusions:      Scheduled Meds:   amLODIPine  5 mg Oral Daily    atorvastatin  20 mg Oral Daily    enoxaparin  40 mg Subcutaneous Daily    hydroCHLOROthiazide  12.5 mg Oral Daily    losartan  100 mg Oral Daily    mupirocin   Nasal BID    pantoprazole  40 mg Oral Daily    polyethylene glycol  17 g Oral BID     PRN Meds:acetaminophen, albuterol-ipratropium, aluminum-magnesium hydroxide-simethicone, dextrose 10%, dextrose 10%, dextrose 10%, dextrose 10%, diphenhydrAMINE, glucagon (human recombinant), glucagon (human recombinant), glucose, glucose, insulin aspart U-100, melatonin, morphine, naloxone, naloxone, ondansetron, oxyCODONE, oxyCODONE, promethazine, sodium chloride 0.9%     Review of Systems   Constitutional:  Positive for activity change. Negative for appetite change, chills, fatigue, fever and unexpected weight change.   HENT:  Negative for congestion, mouth sores, nosebleeds, sore throat, trouble swallowing and voice change.    Eyes:  Negative for visual disturbance.    Respiratory:  Negative for cough, chest tightness, shortness of breath and wheezing.    Cardiovascular:  Negative for chest pain, palpitations and leg swelling.   Gastrointestinal:  Negative for abdominal distention, abdominal pain, anal bleeding, blood in stool, constipation, diarrhea, nausea and vomiting.   Genitourinary:  Negative for difficulty urinating, dysuria and hematuria.   Musculoskeletal:  Negative for arthralgias, back pain and myalgias.   Skin:  Negative for pallor, rash and wound.   Neurological:  Negative for dizziness, syncope, weakness and headaches.   Hematological:  Negative for adenopathy. Does not bruise/bleed easily.   Psychiatric/Behavioral:  The patient is nervous/anxious.    Objective:     Vital Signs (Most Recent):  Temp: 97.8 °F (36.6 °C) (12/15/22 0720)  Pulse: 82 (12/15/22 0720)  Resp: 18 (12/15/22 0720)  BP: (!) 105/56 (12/15/22 0720)  SpO2: (!) 91 % (12/15/22 0720)   Vital Signs (24h Range):  Temp:  [97.8 °F (36.6 °C)-98.9 °F (37.2 °C)] 97.8 °F (36.6 °C)  Pulse:  [82-92] 82  Resp:  [16-18] 18  SpO2:  [91 %-97 %] 91 %  BP: ()/(53-97) 105/56     Weight: 106.9 kg (235 lb 10.8 oz)  Body mass index is 39.22 kg/m².  Body surface area is 2.21 meters squared.      Intake/Output Summary (Last 24 hours) at 12/15/2022 1034  Last data filed at 12/14/2022 1821  Gross per 24 hour   Intake 3910.06 ml   Output --   Net 3910.06 ml         Physical Exam  Vitals reviewed.   Constitutional:       Appearance: She is well-developed.   HENT:      Head: Normocephalic.      Right Ear: External ear normal.      Left Ear: External ear normal.      Nose: Nose normal.   Eyes:      General: Lids are normal. No scleral icterus.        Right eye: No discharge.         Left eye: No discharge.      Conjunctiva/sclera: Conjunctivae normal.   Neck:      Thyroid: No thyroid mass.   Cardiovascular:      Rate and Rhythm: Normal rate and regular rhythm.      Heart sounds: Normal heart sounds.   Pulmonary:       Effort: Pulmonary effort is normal. No respiratory distress.      Breath sounds: Normal breath sounds. No wheezing or rales.   Abdominal:      General: There is no distension.   Genitourinary:     Comments: deferred  Musculoskeletal:         General: Normal range of motion.      Cervical back: Normal range of motion.   Skin:     General: Skin is warm and dry.   Neurological:      Mental Status: She is alert and oriented to person, place, and time.   Psychiatric:         Speech: Speech normal.         Behavior: Behavior normal. Behavior is cooperative.         Thought Content: Thought content normal.       Significant Labs:   BMP:   No results for input(s): GLU, NA, K, CL, CO2, BUN, CREATININE, CALCIUM, MG in the last 48 hours.  , CBC:   No results for input(s): WBC, HGB, HCT, PLT in the last 48 hours.  , LFTs:   No results for input(s): ALT, AST, ALKPHOS, BILITOT, PROT, ALBUMIN in the last 48 hours.  , Urine Studies: No results for input(s): COLORU, APPEARANCEUA, PHUR, SPECGRAV, PROTEINUA, GLUCUA, KETONESU, BILIRUBINUA, OCCULTUA, NITRITE, UROBILINOGEN, LEUKOCYTESUR, RBCUA, WBCUA, BACTERIA, SQUAMEPITHEL, HYALINECASTS in the last 48 hours.    Invalid input(s): WRIGHTSUR, and All pertinent labs from the last 24 hours have been reviewed.    Diagnostic Results:  None    Assessment/Plan:     * Bowel obstruction  -s/p ex lap with ileocolonic diversion and biopsy of intra-abdominal mass  -pending pathology   -General surgery following  -Diet advancement per General surgery/primary team  -awaiting return of bowel function. Encourage ambulation. Encourage OOB to mireille  -Plan for outpatient Oncology follow up for pathology results      Wrist pain, acute, left  -xray unremarkable     Type 2 diabetes mellitus, with long-term current use of insulin  Cared for by primary care team    Primary hypertension  Cared for by primary care team    Generalized abdominal pain  Secondary to bowel obstruction and abdominal mass        Thank you  for your consult. I will follow-up with patient. Please contact us if you have any additional questions.     Esperanza Shin NP  Hematology/Oncology  [facility]  15 minutes of total time spent on the encounter, which includes face to face time and non-face to face time preparing to see the patient (eg, review of tests), Obtaining and/or reviewing separately obtained history, Documenting clinical information in the electronic or other health record, Independently interpreting results (not separately reported) and communicating results to the patient/family/caregiver, or Care coordination (not separately reported).

## 2022-12-15 NOTE — NURSING
Report received from JESSIKA Chung to resume care of pt. Pt does not have IV access, pt refused to start new IV access. Notified provider Ramin Yeager NP.

## 2022-12-15 NOTE — SUBJECTIVE & OBJECTIVE
Interval History:  Tolerating clear liquids, belching improved, no flatus or bowel movement, pain well controlled, ambulating    Medications:  Continuous Infusions:      Scheduled Meds:   amLODIPine  5 mg Oral Daily    atorvastatin  20 mg Oral Daily    enoxaparin  40 mg Subcutaneous Daily    hydroCHLOROthiazide  12.5 mg Oral Daily    losartan  100 mg Oral Daily    mupirocin   Nasal BID    pantoprazole  40 mg Oral Daily    polyethylene glycol  17 g Oral BID     PRN Meds:acetaminophen, albuterol-ipratropium, aluminum-magnesium hydroxide-simethicone, dextrose 10%, dextrose 10%, dextrose 10%, dextrose 10%, diphenhydrAMINE, glucagon (human recombinant), glucagon (human recombinant), glucose, glucose, insulin aspart U-100, melatonin, morphine, naloxone, naloxone, ondansetron, oxyCODONE, oxyCODONE, promethazine, sodium chloride 0.9%     Review of patient's allergies indicates:  No Known Allergies  Objective:     Vital Signs (Most Recent):  Temp: 97.8 °F (36.6 °C) (12/15/22 0720)  Pulse: 82 (12/15/22 0720)  Resp: 18 (12/15/22 0720)  BP: (!) 105/56 (12/15/22 0720)  SpO2: (!) 91 % (12/15/22 0720)   Vital Signs (24h Range):  Temp:  [97.8 °F (36.6 °C)-98.9 °F (37.2 °C)] 97.8 °F (36.6 °C)  Pulse:  [82-92] 82  Resp:  [16-18] 18  SpO2:  [91 %-97 %] 91 %  BP: ()/(53-97) 105/56     Weight: 106.9 kg (235 lb 10.8 oz)  Body mass index is 39.22 kg/m².    Intake/Output - Last 3 Shifts         12/13 0700  12/14 0659 12/14 0700  12/15 0659 12/15 0700  12/16 0659    I.V. (mL/kg) 24 (0.2) 3910.1 (36.6)     Total Intake(mL/kg) 24 (0.2) 3910.1 (36.6)     Urine (mL/kg/hr)       Drains       Total Output       Net +24 +3910.1            Urine Occurrence 1 x 3 x             Physical Exam  Vitals and nursing note reviewed.   Constitutional:       General: She is not in acute distress.     Appearance: She is well-developed.   HENT:      Head: Normocephalic and atraumatic.      Right Ear: External ear normal.      Left Ear: External ear  normal.   Eyes:      Extraocular Movements: Extraocular movements intact.      Conjunctiva/sclera: Conjunctivae normal.   Cardiovascular:      Rate and Rhythm: Normal rate.   Pulmonary:      Effort: Pulmonary effort is normal. No respiratory distress.   Abdominal:      General: There is distension.      Tenderness: There is abdominal tenderness (incisional).      Comments: Abdomen with moderate distention and expected TTP. Staples in place, clean and dry without SOI   Musculoskeletal:      Cervical back: Neck supple.   Skin:     General: Skin is warm and dry.   Neurological:      Mental Status: She is alert and oriented to person, place, and time.   Psychiatric:         Behavior: Behavior normal.       Significant Labs:  I have reviewed all pertinent lab results within the past 24 hours.  CBC:   Recent Labs   Lab 12/13/22  0553   WBC 9.74   RBC 3.80*   HGB 10.8*   HCT 34.7*      MCV 91   MCH 28.4   MCHC 31.1*       CMP:   Recent Labs   Lab 12/13/22  0553   GLU 87   CALCIUM 8.8   ALBUMIN 3.0*   PROT 6.0      K 4.0   CO2 28   CL 99   BUN 9   CREATININE 0.7   ALKPHOS 57   ALT 13   AST 21   BILITOT 1.8*         Significant Diagnostics:  I have reviewed all pertinent imaging results/findings within the past 24 hours.  No new pertinent imaging

## 2022-12-16 LAB
ALBUMIN SERPL BCP-MCNC: 2.9 G/DL (ref 3.5–5.2)
ALP SERPL-CCNC: 57 U/L (ref 55–135)
ALT SERPL W/O P-5'-P-CCNC: 13 U/L (ref 10–44)
ANION GAP SERPL CALC-SCNC: 13 MMOL/L (ref 8–16)
AST SERPL-CCNC: 21 U/L (ref 10–40)
BASOPHILS # BLD AUTO: 0.02 K/UL (ref 0–0.2)
BASOPHILS NFR BLD: 0.3 % (ref 0–1.9)
BILIRUB SERPL-MCNC: 1.2 MG/DL (ref 0.1–1)
BUN SERPL-MCNC: 9 MG/DL (ref 6–20)
CALCIUM SERPL-MCNC: 9.5 MG/DL (ref 8.7–10.5)
CHLORIDE SERPL-SCNC: 96 MMOL/L (ref 95–110)
CO2 SERPL-SCNC: 26 MMOL/L (ref 23–29)
CREAT SERPL-MCNC: 0.7 MG/DL (ref 0.5–1.4)
DIFFERENTIAL METHOD: ABNORMAL
EOSINOPHIL # BLD AUTO: 0.1 K/UL (ref 0–0.5)
EOSINOPHIL NFR BLD: 1.8 % (ref 0–8)
ERYTHROCYTE [DISTWIDTH] IN BLOOD BY AUTOMATED COUNT: 14 % (ref 11.5–14.5)
EST. GFR  (NO RACE VARIABLE): >60 ML/MIN/1.73 M^2
GLUCOSE SERPL-MCNC: 111 MG/DL (ref 70–110)
HCT VFR BLD AUTO: 35 % (ref 37–48.5)
HGB BLD-MCNC: 10.9 G/DL (ref 12–16)
IMM GRANULOCYTES # BLD AUTO: 0.03 K/UL (ref 0–0.04)
IMM GRANULOCYTES NFR BLD AUTO: 0.4 % (ref 0–0.5)
LYMPHOCYTES # BLD AUTO: 0.8 K/UL (ref 1–4.8)
LYMPHOCYTES NFR BLD: 11.4 % (ref 18–48)
MCH RBC QN AUTO: 27.9 PG (ref 27–31)
MCHC RBC AUTO-ENTMCNC: 31.1 G/DL (ref 32–36)
MCV RBC AUTO: 90 FL (ref 82–98)
MONOCYTES # BLD AUTO: 0.9 K/UL (ref 0.3–1)
MONOCYTES NFR BLD: 12.8 % (ref 4–15)
NEUTROPHILS # BLD AUTO: 5.3 K/UL (ref 1.8–7.7)
NEUTROPHILS NFR BLD: 73.3 % (ref 38–73)
NRBC BLD-RTO: 0 /100 WBC
PLATELET # BLD AUTO: 380 K/UL (ref 150–450)
PMV BLD AUTO: 10.7 FL (ref 9.2–12.9)
POCT GLUCOSE: 74 MG/DL (ref 70–110)
POCT GLUCOSE: 78 MG/DL (ref 70–110)
POCT GLUCOSE: 90 MG/DL (ref 70–110)
POCT GLUCOSE: 95 MG/DL (ref 70–110)
POTASSIUM SERPL-SCNC: 4.2 MMOL/L (ref 3.5–5.1)
PROT SERPL-MCNC: 7 G/DL (ref 6–8.4)
RBC # BLD AUTO: 3.91 M/UL (ref 4–5.4)
SODIUM SERPL-SCNC: 135 MMOL/L (ref 136–145)
WBC # BLD AUTO: 7.27 K/UL (ref 3.9–12.7)

## 2022-12-16 PROCEDURE — 85025 COMPLETE CBC W/AUTO DIFF WBC: CPT

## 2022-12-16 PROCEDURE — 11000001 HC ACUTE MED/SURG PRIVATE ROOM

## 2022-12-16 PROCEDURE — 63600175 PHARM REV CODE 636 W HCPCS: Performed by: SURGERY

## 2022-12-16 PROCEDURE — 25000003 PHARM REV CODE 250: Performed by: FAMILY MEDICINE

## 2022-12-16 PROCEDURE — 25000003 PHARM REV CODE 250: Performed by: SURGERY

## 2022-12-16 PROCEDURE — 94761 N-INVAS EAR/PLS OXIMETRY MLT: CPT

## 2022-12-16 PROCEDURE — 99232 PR SUBSEQUENT HOSPITAL CARE,LEVL II: ICD-10-PCS | Mod: ,,, | Performed by: INTERNAL MEDICINE

## 2022-12-16 PROCEDURE — 99232 SBSQ HOSP IP/OBS MODERATE 35: CPT | Mod: ,,, | Performed by: INTERNAL MEDICINE

## 2022-12-16 PROCEDURE — 25000003 PHARM REV CODE 250: Performed by: NURSE PRACTITIONER

## 2022-12-16 PROCEDURE — 25000003 PHARM REV CODE 250

## 2022-12-16 PROCEDURE — 80053 COMPREHEN METABOLIC PANEL: CPT

## 2022-12-16 PROCEDURE — 36415 COLL VENOUS BLD VENIPUNCTURE: CPT

## 2022-12-16 RX ADMIN — ENOXAPARIN SODIUM 40 MG: 40 INJECTION SUBCUTANEOUS at 05:12

## 2022-12-16 RX ADMIN — MUPIROCIN: 20 OINTMENT TOPICAL at 08:12

## 2022-12-16 RX ADMIN — OXYCODONE HYDROCHLORIDE 10 MG: 5 TABLET ORAL at 03:12

## 2022-12-16 RX ADMIN — POLYETHYLENE GLYCOL 3350 17 G: 17 POWDER, FOR SOLUTION ORAL at 09:12

## 2022-12-16 RX ADMIN — MUPIROCIN: 20 OINTMENT TOPICAL at 09:12

## 2022-12-16 RX ADMIN — Medication 6 MG: at 09:12

## 2022-12-16 RX ADMIN — DIPHENHYDRAMINE HYDROCHLORIDE 25 MG: 25 CAPSULE ORAL at 09:12

## 2022-12-16 RX ADMIN — OXYCODONE HYDROCHLORIDE 10 MG: 5 TABLET ORAL at 09:12

## 2022-12-16 RX ADMIN — HYDROCHLOROTHIAZIDE 12.5 MG: 12.5 TABLET ORAL at 08:12

## 2022-12-16 RX ADMIN — POLYETHYLENE GLYCOL 3350 17 G: 17 POWDER, FOR SOLUTION ORAL at 08:12

## 2022-12-16 RX ADMIN — OXYCODONE HYDROCHLORIDE 10 MG: 5 TABLET ORAL at 08:12

## 2022-12-16 RX ADMIN — ATORVASTATIN CALCIUM 20 MG: 10 TABLET, FILM COATED ORAL at 08:12

## 2022-12-16 RX ADMIN — OXYCODONE HYDROCHLORIDE 10 MG: 5 TABLET ORAL at 12:12

## 2022-12-16 RX ADMIN — Medication 6 MG: at 12:12

## 2022-12-16 RX ADMIN — DIPHENHYDRAMINE HYDROCHLORIDE 25 MG: 25 CAPSULE ORAL at 12:12

## 2022-12-16 RX ADMIN — PANTOPRAZOLE SODIUM 40 MG: 40 TABLET, DELAYED RELEASE ORAL at 08:12

## 2022-12-16 NOTE — ASSESSMENT & PLAN NOTE
S/p ex lap with ileocolonic diversion and biopsy of intra-abdominal mass    - Continue clear liquids, monitor for tolerance  - Await full return of bowel function, bowel regimen/ miralax bid  - Encouraged OOB, ambulation  - DVT and GI prophylaxis  - Incentive spirometry  - p.o. pain medication with IV breakthrough  - Once patient has a bowel movement, can trial regular diet and if tolerated possible discharge

## 2022-12-16 NOTE — PROGRESS NOTES
[facility] Medicine  Progress Note    Patient Name: Becky Steen  MRN: [id number]  Patient Class: IP- Inpatient   Admission Date: 12/10/2022  Length of Stay: 6 days  Attending Physician: Jamie Horvath MD  Primary Care Provider: SANDEEP Perdue        Subjective:     Principal Problem:Bowel obstruction        HPI:  Becky Steen is a 57 y.o. female with a PMH  has a past medical history of Asthma, Diabetes mellitus, GERD (gastroesophageal reflux disease), HLD (hyperlipidemia), and Hypertension. who presented as a transfer from [facility] for higher level of care after patient presented with complaints of generalized abdominal pain with associated nausea and vomiting and was found to have evidence of a soft tissue mass arising from the cecum/distal small bowel causing bowel obstruction noted on CT abdomen/pelvis. Patient reported acute onset and progressively worsening generalized abdominal pain which began approximately 2 weeks prior to admission. She reported pain is intermittent in frequency and described as a sharp/labored like pains throughout her entire abdomen, currently rated 0/10 in severity but worsens to 10/10 in severity with no known alleviating or aggravating factors noted.  Associated symptoms included nausea, vomiting, constipation, and decreased oral intake but denied endorsing any fever, chills, sweats, chest pain, shortness a breath, dysuria, hematuria, hematochezia, melena, or neurological deficits.  Patient reported having small bowel movement yesterday with intermittent bowel movements throughout the week reported loose/watery nature.  Patient reported last colonoscopy was within 10 years with [facility] and was told to come back 10 years later for repeat.  Patient follows Dr. Alcala at [facility] for OBGYN with last reported mg obtain last year and Pap smear few months ago prior to admission and reported both were normal.  Patient reports history of skin  cancer in her father but is unaware of any other known family history of cancers.  Prior to onset of symptoms, patient reported being in her usual state of health no other concerns or complaints.  All other review of systems negative except as noted above.  Patient admitted to Hospital Medicine for continued medical management of bowel obstruction and further cancer workup. General Surgery aware of patient and awaiting further evaluation in the morning.     PCP: Kerry Aguilar        Overview/Hospital Course:  Admitted for right lower quadrant abdominal mass causing bowel obstruction. POD5 S/p ex lap with ileocolonic diversion and biopsy of intra-abdominal mass. Tolerating clear liquid diet, denies abdominal pain, nausea, vomiting. Awaiting return of bowel function, denies bowel movements, passing flatus      Review of Systems   Constitutional:  Negative for activity change, appetite change, fatigue and fever.   Respiratory:  Negative for shortness of breath.    Cardiovascular:  Negative for chest pain.   Gastrointestinal:  Positive for abdominal pain, diarrhea and nausea.   Neurological:  Negative for weakness.   Psychiatric/Behavioral:  Negative for agitation, behavioral problems, confusion, decreased concentration and dysphoric mood. The patient is not nervous/anxious.    Objective:     Vital Signs (Most Recent):  Temp: 97.9 °F (36.6 °C) (12/16/22 1241)  Pulse: 91 (12/16/22 1241)  Resp: 17 (12/16/22 1530)  BP: (!) 92/59 (12/16/22 1241)  SpO2: 98 % (12/16/22 1241) Vital Signs (24h Range):  Temp:  [97.6 °F (36.4 °C)-98.7 °F (37.1 °C)] 97.9 °F (36.6 °C)  Pulse:  [70-91] 91  Resp:  [16-18] 17  SpO2:  [94 %-98 %] 98 %  BP: ()/(55-59) 92/59     Weight: 106.9 kg (235 lb 10.8 oz)  Body mass index is 39.22 kg/m².  No intake or output data in the 24 hours ending 12/16/22 1619     Physical Exam  Vitals and nursing note reviewed.   Constitutional:       General: She is not in acute distress.     Appearance: She is  obese. She is not ill-appearing or toxic-appearing.   HENT:      Head: Normocephalic and atraumatic.   Cardiovascular:      Rate and Rhythm: Normal rate.   Pulmonary:      Effort: Pulmonary effort is normal. No respiratory distress.   Abdominal:      General: Bowel sounds are normal. There is no distension.      Palpations: Abdomen is soft.      Tenderness: There is abdominal tenderness. There is no guarding.      Comments: Abdomen with minimal distention and expected TTP. Staples in place, clean and dry without SOI    Musculoskeletal:      Right lower leg: No edema.      Left lower leg: No edema.   Skin:     General: Skin is warm.   Neurological:      Mental Status: She is alert and oriented to person, place, and time.   Psychiatric:         Mood and Affect: Mood is not anxious.         Speech: Speech normal.         Behavior: Behavior normal. Behavior is cooperative.       Significant Labs: All pertinent labs within the past 24 hours have been reviewed.  CBC:   Recent Labs   Lab 12/15/22  1024 12/16/22  0918   WBC 8.79 7.27   HGB 11.0* 10.9*   HCT 34.8* 35.0*    380       CMP:   Recent Labs   Lab 12/15/22  1024 12/16/22  0918   * 135*   K 4.0 4.2   CL 96 96   CO2 21* 26   GLU 92 111*   BUN 11 9   CREATININE 0.7 0.7   CALCIUM 9.5 9.5   PROT  --  7.0   ALBUMIN  --  2.9*   BILITOT  --  1.2*   ALKPHOS  --  57   AST  --  21   ALT  --  13   ANIONGAP 15 13         Significant Imaging: I have reviewed all pertinent imaging results/findings within the past 24 hours.  CT: I have reviewed all pertinent results/findings within the past 24 hours and my personal findings are:  abdomen-soft tissue mass 11cm, peritoneal carinomatosis  Kub-satisfactory placement of ngt      Assessment/Plan:      * Bowel obstruction  POD5 s/p ex lap with ileocolonic diversion and biopsy of intra-abdominal mass  F/u surgery recs  Clear liquids, advance as tolerated  Pain control      Class 2 severe obesity due to excess calories with  serious comorbidity and body mass index (BMI) of 36.0 to 36.9 in adult  Body mass index is 36.96 kg/m². Elevation likely secondary to increased calorie intake and sedentary lifestyle. Patient educated on morbidity and mortality in regards to elevated BMI and stressed importance of diet and exercise.   Plan:  -low fat/low calorie diet       Asthma  Patient with history of Asthma currently on inhalers but not home oxygen. Not presently in acute exacerbation and is without signs/symptoms of distress. Patient currently saturating  99% on 2 L/min. CXR revealed satisfactory nasogastric tube with clear lungs and normal appearance of pulmonary vasculature without evidence of pleural effusion or pneumothorax.   Plan:  -Continue home medications, titrate as needed  -Titrate oxygen requirements as needed  -Incentive spirometry   -Monitor pulse oximetry  -Duonebs prn          Type 2 diabetes mellitus, with long-term current use of insulin  Patient's FSGs are controlled on current medication regimen.  Last A1c reviewed-   Lab Results   Component Value Date    HGBA1C 5.9 (H) 12/11/2022     Most recent fingerstick glucose reviewed-   Recent Labs   Lab 12/11/22  0556   POCTGLUCOSE 87     Current correctional scale  low  titrate anti-hyperglycemic dose as follows-   Antihyperglycemics (From admission, onward)    Start     Stop Route Frequency Ordered    12/11/22 0321  insulin aspart U-100 pen 1-10 Units         -- SubQ Every 6 hours PRN 12/11/22 0222      Plan:  -SSI  -Continue home insulin at decreased dose and titrate as needed  -Hold oral hypoglycemics while patient is in the hospital  -Hypoglycemic protocol     Gastroesophageal reflux disease without esophagitis  Chronic. Stable. Currently asymptomatic. Home medications include PPI/Antacids as needed.  Plan:  -Continue PPI/Antacids as needed         Primary hypertension  Currently normotensive.  Plan:  -Optimize pain control   -Continue home medications, titrate as needed    -Monitor BP  -Low salt/cardiac diet when not NPO  -IV hydralazine prn for SBP>160 or DBP>90       Continue current regimen and adjust accordingly        Generalized abdominal pain  Patient presented with worsening abdominal pain with associated nausea, vomiting, decreased p.o. intake, and constipation x2 weeks' duration and was found to have evidence of bowel obstruction associated with soft tissue mass.  CT abdomen/pelvis revealed an 11 cm soft tissue mass either arising from the cecum or distal small bowel consistent with malignancy causing a high-grade distal mechanical small bowel obstruction in addition to presence of peritoneal carcinomatosis.  Patient remains afebrile without leukocytosis and remains hemodynamically stable.  CMP, CBC, LFTs, and lipase within normal limits.  UA negative for UTI.  Patient status post NG tube insertion and currently on suction.  General surgery consulted and awaiting further evaluation/recommendations.  CEA and  ordered and pending prior to transfer.  Oncology consulted for malignant workup and to establish care.    Plan:  -Continue current pain regimen, titrate as needed  -advance diet per surgery          VTE Risk Mitigation (From admission, onward)         Ordered     enoxaparin injection 40 mg  Daily         12/10/22 2225     IP VTE HIGH RISK PATIENT  Once         12/10/22 2225     Place sequential compression device  Until discontinued         12/10/22 2225                Discharge Planning   FABIANA:      Code Status: Full Code   Is the patient medically ready for discharge?:     Reason for patient still in hospital (select all that apply): Patient trending condition, Treatment and Consult recommendations  Discharge Plan A: Home                  Jamie Horvath MD  Department of Hospital Medicine   [facility] Med S[facility]

## 2022-12-16 NOTE — ASSESSMENT & PLAN NOTE
POD5 s/p ex lap with ileocolonic diversion and biopsy of intra-abdominal mass  F/u surgery recs  Clear liquids, advance as tolerated  Pain control

## 2022-12-16 NOTE — DISCHARGE INSTRUCTIONS
Please call for any fever, increase in pain, nausea or vomiting or redness or drainage from incision(s).    No lifting more than 30 pounds for 8 weeks    May shower     No driving for taking the narcotic pain medicine    If you become constipated from the pain medication you can use over the counter laxatives,  Miralax or Glycolax, or Magnesium Citrate for severe constipation.    Please make sure you have a follow-up appointment with Dr. Davis for the week of December 26th for staple removal    If you have any questions or concerns please call the office    Our office phone numbers are  [phone] and       Dressing change with wet to dry gauze daily

## 2022-12-16 NOTE — PROGRESS NOTES
[facility] Surg  General Surgery  Progress Note    Subjective:     History of Present Illness:  56 y/o female referred for right lower quadrant abdominal mass causing bowel obstruction. Patient reports constipation and decreased appetite. Denies any weight loss. Recently began having abdominal pain and bloating. CT in ER showing intraabdominal mass in right lower quadrant causing bowel obstruction.  Only previous abdominal/pelvic surgery was a tubal ligation.      Post-Op Info:  Procedure(s) (LRB):  LAPAROTOMY, EXPLORATORY (N/A)  CYSTOSCOPY, WITH URETERAL STENT INSERTION (Right)  BLOCK, TRANSVERSUS ABDOMINIS PLANE (N/A)  PYELOGRAM, RETROGRADE (Right)   5 Days Post-Op     Interval History: No acute changes. Passing flatus but no bowel movement. Feeling well overall, some bloating. Tolerating clear liquids    Medications:  Continuous Infusions:  Scheduled Meds:   amLODIPine  5 mg Oral Daily    atorvastatin  20 mg Oral Daily    enoxaparin  40 mg Subcutaneous Daily    hydroCHLOROthiazide  12.5 mg Oral Daily    losartan  100 mg Oral Daily    mupirocin   Nasal BID    pantoprazole  40 mg Oral Daily    polyethylene glycol  17 g Oral BID     PRN Meds:acetaminophen, albuterol-ipratropium, aluminum-magnesium hydroxide-simethicone, dextrose 10%, dextrose 10%, dextrose 10%, dextrose 10%, diphenhydrAMINE, glucagon (human recombinant), glucagon (human recombinant), glucose, glucose, insulin aspart U-100, melatonin, morphine, naloxone, naloxone, ondansetron, oxyCODONE, oxyCODONE, promethazine, sodium chloride 0.9%     Review of patient's allergies indicates:  No Known Allergies  Objective:     Vital Signs (Most Recent):  Temp: 97.6 °F (36.4 °C) (12/16/22 0802)  Pulse: 73 (12/16/22 0802)  Resp: 18 (12/16/22 0802)  BP: (!) 107/55 (12/16/22 0802)  SpO2: 95 % (12/16/22 0802)   Vital Signs (24h Range):  Temp:  [97.6 °F (36.4 °C)-98.7 °F (37.1 °C)] 97.6 °F (36.4 °C)  Pulse:  [70-87] 73  Resp:  [17-18] 18  SpO2:  [94 %-96 %] 95  %  BP: ()/(55-64) 107/55     Weight: 106.9 kg (235 lb 10.8 oz)  Body mass index is 39.22 kg/m².    Intake/Output - Last 3 Shifts         12/14 0700  12/15 0659 12/15 0700  12/16 0659 12/16 0700  12/17 0659    I.V. (mL/kg) 3910.1 (36.6)      Total Intake(mL/kg) 3910.1 (36.6)      Net +3910.1             Urine Occurrence 3 x 2 x             Physical Exam  Vitals and nursing note reviewed.   Constitutional:       General: She is not in acute distress.     Appearance: She is well-developed.   HENT:      Head: Normocephalic and atraumatic.      Right Ear: External ear normal.      Left Ear: External ear normal.   Eyes:      Extraocular Movements: Extraocular movements intact.   Cardiovascular:      Rate and Rhythm: Normal rate.   Pulmonary:      Effort: Pulmonary effort is normal. No respiratory distress.   Abdominal:      Comments: Abdomen with mild distention and expected quan-incisional TTP. Incision with staples in place, clean and dry without SOI   Musculoskeletal:      Cervical back: Neck supple.   Skin:     General: Skin is warm and dry.   Neurological:      Mental Status: She is alert and oriented to person, place, and time.   Psychiatric:         Behavior: Behavior normal.       Significant Labs:  I have reviewed all pertinent lab results within the past 24 hours.  CBC:   Recent Labs   Lab 12/15/22  1024   WBC 8.79   RBC 3.91*   HGB 11.0*   HCT 34.8*      MCV 89   MCH 28.1   MCHC 31.6*     CMP:   Recent Labs   Lab 12/13/22  0553 12/15/22  1024   GLU 87 92   CALCIUM 8.8 9.5   ALBUMIN 3.0*  --    PROT 6.0  --     132*   K 4.0 4.0   CO2 28 21*   CL 99 96   BUN 9 11   CREATININE 0.7 0.7   ALKPHOS 57  --    ALT 13  --    AST 21  --    BILITOT 1.8*  --        Significant Diagnostics:  I have reviewed all pertinent imaging results/findings within the past 24 hours.  No new pertinent imaging    Assessment/Plan:     * Bowel obstruction  S/p ex lap with ileocolonic diversion and biopsy of  intra-abdominal mass    - Continue clear liquids, monitor for tolerance  - Await full return of bowel function, bowel regimen/ miralax bid  - Encouraged OOB, ambulation  - DVT and GI prophylaxis  - Incentive spirometry  - p.o. pain medication with IV breakthrough  - Once patient has a bowel movement, can trial regular diet and if tolerated possible discharge    Class 2 severe obesity due to excess calories with serious comorbidity and body mass index (BMI) of 36.0 to 36.9 in adult  Dietary modifications    Type 2 diabetes mellitus, with long-term current use of insulin  Medical mgmt    Primary hypertension  Medical mgmt        Yaneth Lopez PA-C  General Surgery  [facility] Med [facility]

## 2022-12-16 NOTE — SUBJECTIVE & OBJECTIVE
Interval History:  Patient is resting comfortably at present time    Oncology Treatment Plan:   [No matching plan found]    Medications:  Continuous Infusions:  Scheduled Meds:   amLODIPine  5 mg Oral Daily    atorvastatin  20 mg Oral Daily    enoxaparin  40 mg Subcutaneous Daily    hydroCHLOROthiazide  12.5 mg Oral Daily    losartan  100 mg Oral Daily    mupirocin   Nasal BID    pantoprazole  40 mg Oral Daily    polyethylene glycol  17 g Oral BID     PRN Meds:acetaminophen, albuterol-ipratropium, aluminum-magnesium hydroxide-simethicone, dextrose 10%, dextrose 10%, dextrose 10%, dextrose 10%, diphenhydrAMINE, glucagon (human recombinant), glucagon (human recombinant), glucose, glucose, insulin aspart U-100, melatonin, morphine, naloxone, naloxone, ondansetron, oxyCODONE, oxyCODONE, promethazine, sodium chloride 0.9%     Review of Systems   Constitutional:  Positive for fatigue.   Gastrointestinal:         Patient reports passing gas but no bowel movement   Musculoskeletal:  Positive for gait problem.   Neurological:  Positive for weakness.   Psychiatric/Behavioral:  Positive for dysphoric mood. The patient is nervous/anxious.    Objective:     Vital Signs (Most Recent):  Temp: 98.6 °F (37 °C) (12/16/22 0521)  Pulse: 79 (12/16/22 0521)  Resp: 18 (12/16/22 0521)  BP: (!) 99/55 (12/16/22 0521)  SpO2: 96 % (12/16/22 0521)   Vital Signs (24h Range):  Temp:  [97.8 °F (36.6 °C)-98.7 °F (37.1 °C)] 98.6 °F (37 °C)  Pulse:  [70-87] 79  Resp:  [17-18] 18  SpO2:  [91 %-96 %] 96 %  BP: ()/(55-64) 99/55     Weight: 106.9 kg (235 lb 10.8 oz)  Body mass index is 39.22 kg/m².  Body surface area is 2.21 meters squared.    No intake or output data in the 24 hours ending 12/16/22 0647    Physical Exam  Constitutional:       Appearance: She is obese. She is ill-appearing.   Neurological:      Motor: No weakness.       Significant Labs:   BMP:   Recent Labs   Lab 12/15/22  1024   GLU 92   *   K 4.0   CL 96   CO2 21*   BUN 11    CREATININE 0.7   CALCIUM 9.5   , CBC:   Recent Labs   Lab 12/15/22  1024   WBC 8.79   HGB 11.0*   HCT 34.8*      , CMP:   Recent Labs   Lab 12/15/22  1024   *   K 4.0   CL 96   CO2 21*   GLU 92   BUN 11   CREATININE 0.7   CALCIUM 9.5   ANIONGAP 15   , Coagulation: No results for input(s): PT, INR, APTT in the last 48 hours., Haptoglobin: No results for input(s): HAPTOGLOBIN in the last 48 hours., Immunology: No results for input(s): SPEP, SUZY, ANETA, FREELAMBDALI in the last 48 hours., LDH: No results for input(s): LDHCSF, BFSOURCE in the last 48 hours., LFTs: No results for input(s): ALT, AST, ALKPHOS, BILITOT, PROT, ALBUMIN in the last 48 hours., Reticulocytes: No results for input(s): RETIC in the last 48 hours., Tumor Markers: No results for input(s): PSA, CEA, , AFPTM, [id number],  in the last 48 hours.    Invalid input(s): ALGTM, Uric Acid No results for input(s): URICACID in the last 48 hours., and Urine Studies: No results for input(s): COLORU, APPEARANCEUA, PHUR, SPECGRAV, PROTEINUA, GLUCUA, KETONESU, BILIRUBINUA, OCCULTUA, NITRITE, UROBILINOGEN, LEUKOCYTESUR, RBCUA, WBCUA, BACTERIA, SQUAMEPITHEL, HYALINECASTS in the last 48 hours.    Invalid input(s): WRIGHTSUR    Diagnostic Results:  I have reviewed all pertinent imaging results/findings within the past 24 hours.

## 2022-12-16 NOTE — SUBJECTIVE & OBJECTIVE
Interval History: No acute changes. Passing flatus but no bowel movement. Feeling well overall, some bloating. Tolerating clear liquids    Medications:  Continuous Infusions:  Scheduled Meds:   amLODIPine  5 mg Oral Daily    atorvastatin  20 mg Oral Daily    enoxaparin  40 mg Subcutaneous Daily    hydroCHLOROthiazide  12.5 mg Oral Daily    losartan  100 mg Oral Daily    mupirocin   Nasal BID    pantoprazole  40 mg Oral Daily    polyethylene glycol  17 g Oral BID     PRN Meds:acetaminophen, albuterol-ipratropium, aluminum-magnesium hydroxide-simethicone, dextrose 10%, dextrose 10%, dextrose 10%, dextrose 10%, diphenhydrAMINE, glucagon (human recombinant), glucagon (human recombinant), glucose, glucose, insulin aspart U-100, melatonin, morphine, naloxone, naloxone, ondansetron, oxyCODONE, oxyCODONE, promethazine, sodium chloride 0.9%     Review of patient's allergies indicates:  No Known Allergies  Objective:     Vital Signs (Most Recent):  Temp: 97.6 °F (36.4 °C) (12/16/22 0802)  Pulse: 73 (12/16/22 0802)  Resp: 18 (12/16/22 0802)  BP: (!) 107/55 (12/16/22 0802)  SpO2: 95 % (12/16/22 0802)   Vital Signs (24h Range):  Temp:  [97.6 °F (36.4 °C)-98.7 °F (37.1 °C)] 97.6 °F (36.4 °C)  Pulse:  [70-87] 73  Resp:  [17-18] 18  SpO2:  [94 %-96 %] 95 %  BP: ()/(55-64) 107/55     Weight: 106.9 kg (235 lb 10.8 oz)  Body mass index is 39.22 kg/m².    Intake/Output - Last 3 Shifts         12/14 0700  12/15 0659 12/15 0700 12/16 0659 12/16 0700 12/17 0659    I.V. (mL/kg) 3910.1 (36.6)      Total Intake(mL/kg) 3910.1 (36.6)      Net +3910.1             Urine Occurrence 3 x 2 x             Physical Exam  Vitals and nursing note reviewed.   Constitutional:       General: She is not in acute distress.     Appearance: She is well-developed.   HENT:      Head: Normocephalic and atraumatic.      Right Ear: External ear normal.      Left Ear: External ear normal.   Eyes:      Extraocular Movements: Extraocular movements intact.    Cardiovascular:      Rate and Rhythm: Normal rate.   Pulmonary:      Effort: Pulmonary effort is normal. No respiratory distress.   Abdominal:      Comments: Abdomen with mild distention and expected quan-incisional TTP. Incision with staples in place, clean and dry without SOI   Musculoskeletal:      Cervical back: Neck supple.   Skin:     General: Skin is warm and dry.   Neurological:      Mental Status: She is alert and oriented to person, place, and time.   Psychiatric:         Behavior: Behavior normal.       Significant Labs:  I have reviewed all pertinent lab results within the past 24 hours.  CBC:   Recent Labs   Lab 12/15/22  1024   WBC 8.79   RBC 3.91*   HGB 11.0*   HCT 34.8*      MCV 89   MCH 28.1   MCHC 31.6*     CMP:   Recent Labs   Lab 12/13/22  0553 12/15/22  1024   GLU 87 92   CALCIUM 8.8 9.5   ALBUMIN 3.0*  --    PROT 6.0  --     132*   K 4.0 4.0   CO2 28 21*   CL 99 96   BUN 9 11   CREATININE 0.7 0.7   ALKPHOS 57  --    ALT 13  --    AST 21  --    BILITOT 1.8*  --        Significant Diagnostics:  I have reviewed all pertinent imaging results/findings within the past 24 hours.  No new pertinent imaging

## 2022-12-16 NOTE — PLAN OF CARE
Patient remains free of falls and injuries during shift. Pt is alert and oriented x4, pleasant and cooperative. Pain managed with ordered meds. Pt still has not had a BM. Bowel sounds are normoactive. Pt ambulated around nurses station and up and down fox with stand-by assistance. Blood sugars monitored. Chart check complete. Active orders reviewed. Will continue plan of care.

## 2022-12-16 NOTE — ASSESSMENT & PLAN NOTE
-s/p ex lap with ileocolonic diversion and biopsy of intra-abdominal mass  -pending pathology   -General surgery following  -Diet advancement per General surgery/primary team  -awaiting return of bowel function. Encourage ambulation. Encourage OOB to mireille  -Plan for outpatient Oncology follow up for pathology results    12/16/2022 awaiting pathology report.  At this time talk to patient if she is discharged we will see back later this coming week.  Once pathology is been reported iron navigation team will check in see back as soon as available.

## 2022-12-16 NOTE — PROGRESS NOTES
[facility] Surg  Hematology/Oncology  Progress Note    Patient Name: Becky Steen  Admission Date: 12/10/2022  Hospital Length of Stay: 6 days  Code Status: Full Code     Subjective:     HPI:  62-year-old female history of abdominal pain over the last 2 weeks.  She states that his pain in her lower abdomen cramping in nature.  Patient that she did have [facility] in distant past she is not sure when aware.  Patient presents with abdominal pain and bowel obstruction with multiple areas of air-fluid level with mass in right lower quadrant I was asked to see the patient for further evaluation ECOG status 2 NG tube in place      Interval History:  Patient is resting comfortably at present time    Oncology Treatment Plan:   [No matching plan found]    Medications:  Continuous Infusions:  Scheduled Meds:   amLODIPine  5 mg Oral Daily    atorvastatin  20 mg Oral Daily    enoxaparin  40 mg Subcutaneous Daily    hydroCHLOROthiazide  12.5 mg Oral Daily    losartan  100 mg Oral Daily    mupirocin   Nasal BID    pantoprazole  40 mg Oral Daily    polyethylene glycol  17 g Oral BID     PRN Meds:acetaminophen, albuterol-ipratropium, aluminum-magnesium hydroxide-simethicone, dextrose 10%, dextrose 10%, dextrose 10%, dextrose 10%, diphenhydrAMINE, glucagon (human recombinant), glucagon (human recombinant), glucose, glucose, insulin aspart U-100, melatonin, morphine, naloxone, naloxone, ondansetron, oxyCODONE, oxyCODONE, promethazine, sodium chloride 0.9%     Review of Systems   Constitutional:  Positive for fatigue.   Gastrointestinal:         Patient reports passing gas but no bowel movement   Musculoskeletal:  Positive for gait problem.   Neurological:  Positive for weakness.   Psychiatric/Behavioral:  Positive for dysphoric mood. The patient is nervous/anxious.    Objective:     Vital Signs (Most Recent):  Temp: 98.6 °F (37 °C) (12/16/22 0521)  Pulse: 79 (12/16/22 0521)  Resp: 18 (12/16/22  0521)  BP: (!) 99/55 (12/16/22 0521)  SpO2: 96 % (12/16/22 0521)   Vital Signs (24h Range):  Temp:  [97.8 °F (36.6 °C)-98.7 °F (37.1 °C)] 98.6 °F (37 °C)  Pulse:  [70-87] 79  Resp:  [17-18] 18  SpO2:  [91 %-96 %] 96 %  BP: ()/(55-64) 99/55     Weight: 106.9 kg (235 lb 10.8 oz)  Body mass index is 39.22 kg/m².  Body surface area is 2.21 meters squared.    No intake or output data in the 24 hours ending 12/16/22 0647    Physical Exam  Constitutional:       Appearance: She is obese. She is ill-appearing.   Neurological:      Motor: No weakness.       Significant Labs:   BMP:   Recent Labs   Lab 12/15/22  1024   GLU 92   *   K 4.0   CL 96   CO2 21*   BUN 11   CREATININE 0.7   CALCIUM 9.5   , CBC:   Recent Labs   Lab 12/15/22  1024   WBC 8.79   HGB 11.0*   HCT 34.8*      , CMP:   Recent Labs   Lab 12/15/22  1024   *   K 4.0   CL 96   CO2 21*   GLU 92   BUN 11   CREATININE 0.7   CALCIUM 9.5   ANIONGAP 15   , Coagulation: No results for input(s): PT, INR, APTT in the last 48 hours., Haptoglobin: No results for input(s): HAPTOGLOBIN in the last 48 hours., Immunology: No results for input(s): SPEP, SUZY, ANETA, FREELAMBDALI in the last 48 hours., LDH: No results for input(s): LDHCSF, BFSOURCE in the last 48 hours., LFTs: No results for input(s): ALT, AST, ALKPHOS, BILITOT, PROT, ALBUMIN in the last 48 hours., Reticulocytes: No results for input(s): RETIC in the last 48 hours., Tumor Markers: No results for input(s): PSA, CEA, , AFPTM, [id number],  in the last 48 hours.    Invalid input(s): ALGTM, Uric Acid No results for input(s): URICACID in the last 48 hours., and Urine Studies: No results for input(s): COLORU, APPEARANCEUA, PHUR, SPECGRAV, PROTEINUA, GLUCUA, KETONESU, BILIRUBINUA, OCCULTUA, NITRITE, UROBILINOGEN, LEUKOCYTESUR, RBCUA, WBCUA, BACTERIA, SQUAMEPITHEL, HYALINECASTS in the last 48 hours.    Invalid input(s): [facility]    Diagnostic Results:  I have reviewed all pertinent imaging  results/findings within the past 24 hours.    Assessment/Plan:     * Bowel obstruction  -s/p ex lap with ileocolonic diversion and biopsy of intra-abdominal mass  -pending pathology   -General surgery following  -Diet advancement per General surgery/primary team  -awaiting return of bowel function. Encourage ambulation. Encourage OOB to mireille  -Plan for outpatient Oncology follow up for pathology results    12/16/2022 awaiting pathology report.  At this time talk to patient if she is discharged we will see back later this coming week.  Once pathology is been reported iron navigation team will check in see back as soon as available.    Wrist pain, acute, left  -xray unremarkable     Type 2 diabetes mellitus, with long-term current use of insulin  Cared for by primary care team    Primary hypertension  Cared for by primary care team    Generalized abdominal pain  Secondary to bowel obstruction and abdominal mass        Thank you for your consult. I will follow-up with patient. Please contact us if you have any additional questions.     Kenton Christianson MD  Hematology/Oncology  [facility]

## 2022-12-16 NOTE — SUBJECTIVE & OBJECTIVE
Review of Systems   Constitutional:  Negative for activity change, appetite change, fatigue and fever.   Respiratory:  Negative for shortness of breath.    Cardiovascular:  Negative for chest pain.   Gastrointestinal:  Positive for abdominal pain, diarrhea and nausea.   Neurological:  Negative for weakness.   Psychiatric/Behavioral:  Negative for agitation, behavioral problems, confusion, decreased concentration and dysphoric mood. The patient is not nervous/anxious.    Objective:     Vital Signs (Most Recent):  Temp: 97.9 °F (36.6 °C) (12/16/22 1241)  Pulse: 91 (12/16/22 1241)  Resp: 17 (12/16/22 1530)  BP: (!) 92/59 (12/16/22 1241)  SpO2: 98 % (12/16/22 1241) Vital Signs (24h Range):  Temp:  [97.6 °F (36.4 °C)-98.7 °F (37.1 °C)] 97.9 °F (36.6 °C)  Pulse:  [70-91] 91  Resp:  [16-18] 17  SpO2:  [94 %-98 %] 98 %  BP: ()/(55-59) 92/59     Weight: 106.9 kg (235 lb 10.8 oz)  Body mass index is 39.22 kg/m².  No intake or output data in the 24 hours ending 12/16/22 1619     Physical Exam  Vitals and nursing note reviewed.   Constitutional:       General: She is not in acute distress.     Appearance: She is obese. She is not ill-appearing or toxic-appearing.   HENT:      Head: Normocephalic and atraumatic.   Cardiovascular:      Rate and Rhythm: Normal rate.   Pulmonary:      Effort: Pulmonary effort is normal. No respiratory distress.   Abdominal:      General: Bowel sounds are normal. There is no distension.      Palpations: Abdomen is soft.      Tenderness: There is abdominal tenderness. There is no guarding.      Comments: Abdomen with minimal distention and expected TTP. Staples in place, clean and dry without SOI    Musculoskeletal:      Right lower leg: No edema.      Left lower leg: No edema.   Skin:     General: Skin is warm.   Neurological:      Mental Status: She is alert and oriented to person, place, and time.   Psychiatric:         Mood and Affect: Mood is not anxious.         Speech: Speech normal.          Behavior: Behavior normal. Behavior is cooperative.       Significant Labs: All pertinent labs within the past 24 hours have been reviewed.  CBC:   Recent Labs   Lab 12/15/22  1024 12/16/22  0918   WBC 8.79 7.27   HGB 11.0* 10.9*   HCT 34.8* 35.0*    380       CMP:   Recent Labs   Lab 12/15/22  1024 12/16/22  0918   * 135*   K 4.0 4.2   CL 96 96   CO2 21* 26   GLU 92 111*   BUN 11 9   CREATININE 0.7 0.7   CALCIUM 9.5 9.5   PROT  --  7.0   ALBUMIN  --  2.9*   BILITOT  --  1.2*   ALKPHOS  --  57   AST  --  21   ALT  --  13   ANIONGAP 15 13         Significant Imaging: I have reviewed all pertinent imaging results/findings within the past 24 hours.  CT: I have reviewed all pertinent results/findings within the past 24 hours and my personal findings are:  abdomen-soft tissue mass 11cm, peritoneal carinomatosis  Kub-satisfactory placement of ngt

## 2022-12-17 LAB
POCT GLUCOSE: 104 MG/DL (ref 70–110)
POCT GLUCOSE: 107 MG/DL (ref 70–110)
POCT GLUCOSE: 113 MG/DL (ref 70–110)
POCT GLUCOSE: 113 MG/DL (ref 70–110)
POCT GLUCOSE: 97 MG/DL (ref 70–110)

## 2022-12-17 PROCEDURE — 25000003 PHARM REV CODE 250: Performed by: SURGERY

## 2022-12-17 PROCEDURE — 63600175 PHARM REV CODE 636 W HCPCS: Performed by: SURGERY

## 2022-12-17 PROCEDURE — 25000003 PHARM REV CODE 250

## 2022-12-17 PROCEDURE — 11000001 HC ACUTE MED/SURG PRIVATE ROOM

## 2022-12-17 PROCEDURE — 25000003 PHARM REV CODE 250: Performed by: COLON & RECTAL SURGERY

## 2022-12-17 PROCEDURE — 99024 PR POST-OP FOLLOW-UP VISIT: ICD-10-PCS | Mod: ,,, | Performed by: COLON & RECTAL SURGERY

## 2022-12-17 PROCEDURE — 99024 POSTOP FOLLOW-UP VISIT: CPT | Mod: ,,, | Performed by: COLON & RECTAL SURGERY

## 2022-12-17 PROCEDURE — 25000003 PHARM REV CODE 250: Performed by: NURSE PRACTITIONER

## 2022-12-17 PROCEDURE — 94761 N-INVAS EAR/PLS OXIMETRY MLT: CPT

## 2022-12-17 RX ORDER — AMOXICILLIN 250 MG
1 CAPSULE ORAL 2 TIMES DAILY
Status: DISCONTINUED | OUTPATIENT
Start: 2022-12-17 | End: 2022-12-19 | Stop reason: HOSPADM

## 2022-12-17 RX ADMIN — OXYCODONE HYDROCHLORIDE 10 MG: 5 TABLET ORAL at 09:12

## 2022-12-17 RX ADMIN — SENNOSIDES AND DOCUSATE SODIUM 1 TABLET: 50; 8.6 TABLET ORAL at 09:12

## 2022-12-17 RX ADMIN — Medication 6 MG: at 09:12

## 2022-12-17 RX ADMIN — OXYCODONE HYDROCHLORIDE 10 MG: 5 TABLET ORAL at 12:12

## 2022-12-17 RX ADMIN — POLYETHYLENE GLYCOL 3350 17 G: 17 POWDER, FOR SOLUTION ORAL at 09:12

## 2022-12-17 RX ADMIN — HYDROCHLOROTHIAZIDE 12.5 MG: 12.5 TABLET ORAL at 08:12

## 2022-12-17 RX ADMIN — ENOXAPARIN SODIUM 40 MG: 40 INJECTION SUBCUTANEOUS at 05:12

## 2022-12-17 RX ADMIN — AMLODIPINE BESYLATE 5 MG: 5 TABLET ORAL at 08:12

## 2022-12-17 RX ADMIN — POLYETHYLENE GLYCOL 3350 17 G: 17 POWDER, FOR SOLUTION ORAL at 08:12

## 2022-12-17 RX ADMIN — LOSARTAN POTASSIUM 100 MG: 50 TABLET, FILM COATED ORAL at 08:12

## 2022-12-17 RX ADMIN — ATORVASTATIN CALCIUM 20 MG: 10 TABLET, FILM COATED ORAL at 08:12

## 2022-12-17 RX ADMIN — PANTOPRAZOLE SODIUM 40 MG: 40 TABLET, DELAYED RELEASE ORAL at 08:12

## 2022-12-17 RX ADMIN — OXYCODONE HYDROCHLORIDE 10 MG: 5 TABLET ORAL at 06:12

## 2022-12-17 RX ADMIN — DIPHENHYDRAMINE HYDROCHLORIDE 25 MG: 25 CAPSULE ORAL at 09:12

## 2022-12-17 RX ADMIN — SENNOSIDES AND DOCUSATE SODIUM 1 TABLET: 50; 8.6 TABLET ORAL at 08:12

## 2022-12-17 NOTE — SUBJECTIVE & OBJECTIVE
Interval history:  patient with flatus. Still no bowel movement.  Pain controlled with meds- improving.  Ambulating in room with assistance.    Tolerating clear liquids with no nausea/vomiting      Review of Systems   Constitutional:  Negative for activity change, appetite change, fatigue and fever.   Respiratory:  Negative for shortness of breath.    Cardiovascular:  Negative for chest pain.   Gastrointestinal:  Positive for abdominal pain and nausea. Negative for diarrhea.   Musculoskeletal:  Positive for back pain.   Neurological:  Negative for weakness.   Psychiatric/Behavioral:  Negative for agitation and behavioral problems. The patient is not nervous/anxious.    Objective:     Vital Signs (Most Recent):  Temp: 97.8 °F (36.6 °C) (12/17/22 0740)  Pulse: 72 (12/17/22 0740)  Resp: 17 (12/17/22 0740)  BP: (!) 117/56 (12/17/22 0740)  SpO2: 95 % (12/17/22 0740) Vital Signs (24h Range):  Temp:  [97.8 °F (36.6 °C)-98.5 °F (36.9 °C)] 97.8 °F (36.6 °C)  Pulse:  [68-91] 72  Resp:  [16-18] 17  SpO2:  [94 %-98 %] 95 %  BP: ()/(55-65) 117/56     Weight: 106.9 kg (235 lb 10.8 oz)  Body mass index is 39.22 kg/m².  No intake or output data in the 24 hours ending 12/17/22 1222     Physical Exam  Vitals and nursing note reviewed.   Constitutional:       General: She is not in acute distress.     Appearance: She is obese. She is not ill-appearing or toxic-appearing.   HENT:      Head: Normocephalic and atraumatic.   Cardiovascular:      Rate and Rhythm: Normal rate.   Pulmonary:      Effort: Pulmonary effort is normal. No respiratory distress.   Abdominal:      Tenderness: There is no guarding.      Comments: Abdomen with minimal distention and expected TTP. Staples in place, clean and dry without SOI    Skin:     General: Skin is warm.   Neurological:      Mental Status: She is alert and oriented to person, place, and time.   Psychiatric:         Mood and Affect: Mood is not anxious.         Speech: Speech normal.          Behavior: Behavior normal. Behavior is cooperative.       Significant Labs: All pertinent labs within the past 24 hours have been reviewed.  CBC:   Recent Labs   Lab 12/16/22  0918   WBC 7.27   HGB 10.9*   HCT 35.0*          CMP:   Recent Labs   Lab 12/16/22  0918   *   K 4.2   CL 96   CO2 26   *   BUN 9   CREATININE 0.7   CALCIUM 9.5   PROT 7.0   ALBUMIN 2.9*   BILITOT 1.2*   ALKPHOS 57   AST 21   ALT 13   ANIONGAP 13         Significant Imaging: I have reviewed all pertinent imaging results/findings within the past 24 hours.  CT: I have reviewed all pertinent results/findings within the past 24 hours and my personal findings are:  abdomen-soft tissue mass 11cm, peritoneal carinomatosis  Kub-satisfactory placement of ngt

## 2022-12-17 NOTE — SUBJECTIVE & OBJECTIVE
Interval History: Continues to pass flatus, no BM. Tolerating clears without nausea or vomiting. Some inferior incisional pain.    Medications:  Continuous Infusions:  Scheduled Meds:   amLODIPine  5 mg Oral Daily    atorvastatin  20 mg Oral Daily    enoxaparin  40 mg Subcutaneous Daily    hydroCHLOROthiazide  12.5 mg Oral Daily    losartan  100 mg Oral Daily    mupirocin   Nasal BID    pantoprazole  40 mg Oral Daily    polyethylene glycol  17 g Oral BID     PRN Meds:acetaminophen, albuterol-ipratropium, aluminum-magnesium hydroxide-simethicone, dextrose 10%, dextrose 10%, dextrose 10%, dextrose 10%, diphenhydrAMINE, glucagon (human recombinant), glucagon (human recombinant), glucose, glucose, insulin aspart U-100, melatonin, morphine, naloxone, naloxone, ondansetron, oxyCODONE, oxyCODONE, promethazine, sodium chloride 0.9%     Review of patient's allergies indicates:  No Known Allergies  Objective:     Vital Signs (Most Recent):  Temp: 97.8 °F (36.6 °C) (12/17/22 0740)  Pulse: 72 (12/17/22 0740)  Resp: 17 (12/17/22 0740)  BP: (!) 117/56 (12/17/22 0740)  SpO2: 95 % (12/17/22 0740)   Vital Signs (24h Range):  Temp:  [97.8 °F (36.6 °C)-98.5 °F (36.9 °C)] 97.8 °F (36.6 °C)  Pulse:  [68-91] 72  Resp:  [16-18] 17  SpO2:  [94 %-98 %] 95 %  BP: ()/(55-65) 117/56     Weight: 106.9 kg (235 lb 10.8 oz)  Body mass index is 39.22 kg/m².    Intake/Output - Last 3 Shifts         12/15 0700 12/16 0659 12/16 0700 12/17 0659 12/17 0700 12/18 0659    I.V. (mL/kg)       Total Intake(mL/kg)       Net              Urine Occurrence 2 x 1 x             Physical Exam  Constitutional:       Appearance: She is well-developed.   HENT:      Head: Normocephalic and atraumatic.   Eyes:      Conjunctiva/sclera: Conjunctivae normal.   Neck:      Thyroid: No thyromegaly.   Cardiovascular:      Rate and Rhythm: Normal rate and regular rhythm.   Pulmonary:      Effort: Pulmonary effort is normal. No respiratory distress.   Abdominal:       Comments: Soft, mild distention, incision clean and dry with some mild inferior surrounding erythema   Musculoskeletal:         General: No tenderness. Normal range of motion.      Cervical back: Normal range of motion.   Skin:     General: Skin is warm and dry.      Capillary Refill: Capillary refill takes less than 2 seconds.      Findings: No rash.   Neurological:      Mental Status: She is alert and oriented to person, place, and time.       Significant Labs:  I have reviewed all pertinent lab results within the past 24 hours.  CBC:   Recent Labs   Lab 12/16/22 0918   WBC 7.27   RBC 3.91*   HGB 10.9*   HCT 35.0*      MCV 90   MCH 27.9   MCHC 31.1*     BMP:   Recent Labs   Lab 12/16/22 0918   *   *   K 4.2   CL 96   CO2 26   BUN 9   CREATININE 0.7   CALCIUM 9.5     CMP:   Recent Labs   Lab 12/16/22 0918   *   CALCIUM 9.5   ALBUMIN 2.9*   PROT 7.0   *   K 4.2   CO2 26   CL 96   BUN 9   CREATININE 0.7   ALKPHOS 57   ALT 13   AST 21   BILITOT 1.2*     LFTs:   Recent Labs   Lab 12/16/22 0918   ALT 13   AST 21   ALKPHOS 57   BILITOT 1.2*   PROT 7.0   ALBUMIN 2.9*       Significant Diagnostics:  I have reviewed all pertinent imaging results/findings within the past 24 hours.

## 2022-12-17 NOTE — PLAN OF CARE
Problem: Adult Inpatient Plan of Care  Goal: Plan of Care Review  Outcome: Ongoing, Progressing  Goal: Patient-Specific Goal (Individualized)  Outcome: Ongoing, Progressing  Goal: Absence of Hospital-Acquired Illness or Injury  Outcome: Ongoing, Progressing  Goal: Optimal Comfort and Wellbeing  Outcome: Ongoing, Progressing  Goal: Readiness for Transition of Care  Outcome: Ongoing, Progressing     Problem: Diabetes Comorbidity  Goal: Blood Glucose Level Within Targeted Range  Outcome: Ongoing, Progressing     Problem: Infection  Goal: Absence of Infection Signs and Symptoms  Outcome: Ongoing, Progressing     Problem: Pain Acute  Goal: Acceptable Pain Control and Functional Ability  Outcome: Ongoing, Progressing

## 2022-12-17 NOTE — PROGRESS NOTES
[facility] Surg  General Surgery  Progress Note    Subjective:     History of Present Illness:  56 y/o female referred for right lower quadrant abdominal mass causing bowel obstruction. Patient reports constipation and decreased appetite. Denies any weight loss. Recently began having abdominal pain and bloating. CT in ER showing intraabdominal mass in right lower quadrant causing bowel obstruction.  Only previous abdominal/pelvic surgery was a tubal ligation.      Post-Op Info:  Procedure(s) (LRB):  LAPAROTOMY, EXPLORATORY (N/A)  CYSTOSCOPY, WITH URETERAL STENT INSERTION (Right)  BLOCK, TRANSVERSUS ABDOMINIS PLANE (N/A)  PYELOGRAM, RETROGRADE (Right)   6 Days Post-Op     Interval History: Continues to pass flatus, no BM. Tolerating clears without nausea or vomiting. Some inferior incisional pain.    Medications:  Continuous Infusions:  Scheduled Meds:   amLODIPine  5 mg Oral Daily    atorvastatin  20 mg Oral Daily    enoxaparin  40 mg Subcutaneous Daily    hydroCHLOROthiazide  12.5 mg Oral Daily    losartan  100 mg Oral Daily    mupirocin   Nasal BID    pantoprazole  40 mg Oral Daily    polyethylene glycol  17 g Oral BID     PRN Meds:acetaminophen, albuterol-ipratropium, aluminum-magnesium hydroxide-simethicone, dextrose 10%, dextrose 10%, dextrose 10%, dextrose 10%, diphenhydrAMINE, glucagon (human recombinant), glucagon (human recombinant), glucose, glucose, insulin aspart U-100, melatonin, morphine, naloxone, naloxone, ondansetron, oxyCODONE, oxyCODONE, promethazine, sodium chloride 0.9%     Review of patient's allergies indicates:  No Known Allergies  Objective:     Vital Signs (Most Recent):  Temp: 97.8 °F (36.6 °C) (12/17/22 0740)  Pulse: 72 (12/17/22 0740)  Resp: 17 (12/17/22 0740)  BP: (!) 117/56 (12/17/22 0740)  SpO2: 95 % (12/17/22 0740)   Vital Signs (24h Range):  Temp:  [97.8 °F (36.6 °C)-98.5 °F (36.9 °C)] 97.8 °F (36.6 °C)  Pulse:  [68-91] 72  Resp:  [16-18] 17  SpO2:  [94 %-98 %] 95 %  BP:  ()/(55-65) 117/56     Weight: 106.9 kg (235 lb 10.8 oz)  Body mass index is 39.22 kg/m².    Intake/Output - Last 3 Shifts         12/15 0700 12/16 0659 12/16 0700 12/17 0659 12/17 0700 12/18 0659    I.V. (mL/kg)       Total Intake(mL/kg)       Net              Urine Occurrence 2 x 1 x             Physical Exam  Constitutional:       Appearance: She is well-developed.   HENT:      Head: Normocephalic and atraumatic.   Eyes:      Conjunctiva/sclera: Conjunctivae normal.   Neck:      Thyroid: No thyromegaly.   Cardiovascular:      Rate and Rhythm: Normal rate and regular rhythm.   Pulmonary:      Effort: Pulmonary effort is normal. No respiratory distress.   Abdominal:      Comments: Soft, mild distention, incision clean and dry with some mild inferior surrounding erythema   Musculoskeletal:         General: No tenderness. Normal range of motion.      Cervical back: Normal range of motion.   Skin:     General: Skin is warm and dry.      Capillary Refill: Capillary refill takes less than 2 seconds.      Findings: No rash.   Neurological:      Mental Status: She is alert and oriented to person, place, and time.       Significant Labs:  I have reviewed all pertinent lab results within the past 24 hours.  CBC:   Recent Labs   Lab 12/16/22 0918   WBC 7.27   RBC 3.91*   HGB 10.9*   HCT 35.0*      MCV 90   MCH 27.9   MCHC 31.1*     BMP:   Recent Labs   Lab 12/16/22 0918   *   *   K 4.2   CL 96   CO2 26   BUN 9   CREATININE 0.7   CALCIUM 9.5     CMP:   Recent Labs   Lab 12/16/22 0918   *   CALCIUM 9.5   ALBUMIN 2.9*   PROT 7.0   *   K 4.2   CO2 26   CL 96   BUN 9   CREATININE 0.7   ALKPHOS 57   ALT 13   AST 21   BILITOT 1.2*     LFTs:   Recent Labs   Lab 12/16/22 0918   ALT 13   AST 21   ALKPHOS 57   BILITOT 1.2*   PROT 7.0   ALBUMIN 2.9*       Significant Diagnostics:  I have reviewed all pertinent imaging results/findings within the past 24 hours.    Assessment/Plan:     * Bowel  obstruction  S/p ex lap with ileocolonic diversion and biopsy of intra-abdominal mass    - Continue clear liquids, monitor for tolerance and advance once starts having full return of bowel function  - Await full return of bowel function, bowel regimen/ miralax bid  - Encouraged OOB, ambulation  - DVT and GI prophylaxis  - Incentive spirometry  - p.o. pain medication with IV breakthrough  - monitor erythema of lower portion of incision fo rnow    Class 2 severe obesity due to excess calories with serious comorbidity and body mass index (BMI) of 36.0 to 36.9 in adult  Dietary modifications    Type 2 diabetes mellitus, with long-term current use of insulin  Medical mgmt    Primary hypertension  Medical mgmt        Antonio Miller MD  General Surgery  [facility]

## 2022-12-17 NOTE — ASSESSMENT & PLAN NOTE
POD5 s/p ex lap with ileocolonic diversion and biopsy of intra-abdominal mass  F/u surgery recs  Clear liquids, advance as tolerated  Pain control  Awaiting bowel function to return. Laxatives prn

## 2022-12-17 NOTE — ASSESSMENT & PLAN NOTE
S/p ex lap with ileocolonic diversion and biopsy of intra-abdominal mass    - Continue clear liquids, monitor for tolerance and advance once starts having full return of bowel function  - Await full return of bowel function, bowel regimen/ miralax bid  - Encouraged OOB, ambulation  - DVT and GI prophylaxis  - Incentive spirometry  - p.o. pain medication with IV breakthrough  - monitor erythema of lower portion of incision fo rnow

## 2022-12-17 NOTE — CONSULTS
Thank you for your consult to [facility]. We have reviewed the patient chart. This patient does meet criteria for [facility] service at this time. Will assume care on 12/17/2022 at 7AM.

## 2022-12-17 NOTE — PROGRESS NOTES
[facility] Medicine  Telemedicine Progress Note    Patient Name: Becky Steen  MRN: [id number]  Patient Class: IP- Inpatient   Admission Date: 12/10/2022  Length of Stay: 7 days  Attending Physician: Geovanna Ridley MD  Primary Care Provider: SANDEEP Perdue          Subjective:     Principal Problem:Bowel obstruction        HPI:  Becky Steen is a 57 y.o. female with a PMH  has a past medical history of Asthma, Diabetes mellitus, GERD (gastroesophageal reflux disease), HLD (hyperlipidemia), and Hypertension. who presented as a transfer from [facility] for higher level of care after patient presented with complaints of generalized abdominal pain with associated nausea and vomiting and was found to have evidence of a soft tissue mass arising from the cecum/distal small bowel causing bowel obstruction noted on CT abdomen/pelvis. Patient reported acute onset and progressively worsening generalized abdominal pain which began approximately 2 weeks prior to admission. She reported pain is intermittent in frequency and described as a sharp/labored like pains throughout her entire abdomen, currently rated 0/10 in severity but worsens to 10/10 in severity with no known alleviating or aggravating factors noted.  Associated symptoms included nausea, vomiting, constipation, and decreased oral intake but denied endorsing any fever, chills, sweats, chest pain, shortness a breath, dysuria, hematuria, hematochezia, melena, or neurological deficits.  Patient reported having small bowel movement yesterday with intermittent bowel movements throughout the week reported loose/watery nature.  Patient reported last colonoscopy was within 10 years [facility] and was told to come back 10 years later for repeat.  Patient follows Dr. Alcala at [facility] for OBGYN with last reported mg obtain last year and Pap smear few months ago prior to admission and reported both were normal.  Patient  reports history of skin cancer in her father but is unaware of any other known family history of cancers.  Prior to onset of symptoms, patient reported being in her usual state of health no other concerns or complaints.  All other review of systems negative except as noted above.  Patient admitted to Hospital Medicine for continued medical management of bowel obstruction and further cancer workup. General Surgery aware of patient and awaiting further evaluation in the morning.     PCP: Kerry Aguilar        Overview/Hospital Course:  Admitted for right lower quadrant abdominal mass causing bowel obstruction. POD5 S/p ex lap with ileocolonic diversion and biopsy of intra-abdominal mass. Tolerating clear liquid diet, denies abdominal pain, nausea, vomiting. Awaiting return of bowel function, denies bowel movements, passing flatus.      Interval history:  patient with flatus. Still no bowel movement.  Pain controlled with meds- improving.  Ambulating in room with assistance.    Tolerating clear liquids with no nausea/vomiting      Review of Systems   Constitutional:  Negative for activity change, appetite change, fatigue and fever.   Respiratory:  Negative for shortness of breath.    Cardiovascular:  Negative for chest pain.   Gastrointestinal:  Positive for abdominal pain and nausea. Negative for diarrhea.   Musculoskeletal:  Positive for back pain.   Neurological:  Negative for weakness.   Psychiatric/Behavioral:  Negative for agitation and behavioral problems. The patient is not nervous/anxious.    Objective:     Vital Signs (Most Recent):  Temp: 97.8 °F (36.6 °C) (12/17/22 0740)  Pulse: 72 (12/17/22 0740)  Resp: 17 (12/17/22 0740)  BP: (!) 117/56 (12/17/22 0740)  SpO2: 95 % (12/17/22 0740) Vital Signs (24h Range):  Temp:  [97.8 °F (36.6 °C)-98.5 °F (36.9 °C)] 97.8 °F (36.6 °C)  Pulse:  [68-91] 72  Resp:  [16-18] 17  SpO2:  [94 %-98 %] 95 %  BP: ()/(55-65) 117/56     Weight: 106.9 kg (235 lb 10.8 oz)  Body  mass index is 39.22 kg/m².  No intake or output data in the 24 hours ending 12/17/22 1222     Physical Exam  Vitals and nursing note reviewed.   Constitutional:       General: She is not in acute distress.     Appearance: She is obese. She is not ill-appearing or toxic-appearing.   HENT:      Head: Normocephalic and atraumatic.   Cardiovascular:      Rate and Rhythm: Normal rate.   Pulmonary:      Effort: Pulmonary effort is normal. No respiratory distress.   Abdominal:      Tenderness: There is no guarding.      Comments: Abdomen with minimal distention and expected TTP. Staples in place, clean and dry without SOI    Skin:     General: Skin is warm.   Neurological:      Mental Status: She is alert and oriented to person, place, and time.   Psychiatric:         Mood and Affect: Mood is not anxious.         Speech: Speech normal.         Behavior: Behavior normal. Behavior is cooperative.       Significant Labs: All pertinent labs within the past 24 hours have been reviewed.  CBC:   Recent Labs   Lab 12/16/22  0918   WBC 7.27   HGB 10.9*   HCT 35.0*          CMP:   Recent Labs   Lab 12/16/22  0918   *   K 4.2   CL 96   CO2 26   *   BUN 9   CREATININE 0.7   CALCIUM 9.5   PROT 7.0   ALBUMIN 2.9*   BILITOT 1.2*   ALKPHOS 57   AST 21   ALT 13   ANIONGAP 13         Significant Imaging: I have reviewed all pertinent imaging results/findings within the past 24 hours.  CT: I have reviewed all pertinent results/findings within the past 24 hours and my personal findings are:  abdomen-soft tissue mass 11cm, peritoneal carinomatosis  Kub-satisfactory placement of ngt      Assessment/Plan:      * Bowel obstruction  POD5 s/p ex lap with ileocolonic diversion and biopsy of intra-abdominal mass  F/u surgery recs  Clear liquids, advance as tolerated  Pain control  Awaiting bowel function to return. Laxatives prn      Class 2 severe obesity due to excess calories with serious comorbidity and body mass index (BMI)  of 36.0 to 36.9 in adult  Body mass index is 36.96 kg/m². Elevation likely secondary to increased calorie intake and sedentary lifestyle. Patient educated on morbidity and mortality in regards to elevated BMI and stressed importance of diet and exercise.   Plan:  -low fat/low calorie diet       Asthma  Patient with history of Asthma currently on inhalers but not home oxygen. Not presently in acute exacerbation and is without signs/symptoms of distress. Patient currently saturating  99% on 2 L/min. CXR revealed satisfactory nasogastric tube with clear lungs and normal appearance of pulmonary vasculature without evidence of pleural effusion or pneumothorax.   Plan:  -Continue home medications, titrate as needed  -Titrate oxygen requirements as needed  -Incentive spirometry   -Monitor pulse oximetry  -Kingsley prn          Type 2 diabetes mellitus, with long-term current use of insulin  Patient's FSGs are controlled on current medication regimen.  Last A1c reviewed-   Lab Results   Component Value Date    HGBA1C 5.9 (H) 12/11/2022     Most recent fingerstick glucose reviewed-   Recent Labs   Lab 12/11/22  0556   POCTGLUCOSE 87     Current correctional scale  low  titrate anti-hyperglycemic dose as follows-   Antihyperglycemics (From admission, onward)    Start     Stop Route Frequency Ordered    12/11/22 0321  insulin aspart U-100 pen 1-10 Units         -- SubQ Every 6 hours PRN 12/11/22 0222      Plan:  -SSI  -Continue home insulin at decreased dose and titrate as needed  -Hold oral hypoglycemics while patient is in the hospital  -Hypoglycemic protocol     Gastroesophageal reflux disease without esophagitis  Chronic. Stable. Currently asymptomatic. Home medications include PPI/Antacids as needed.  Plan:  -Continue PPI/Antacids as needed         Primary hypertension  Currently normotensive.  Plan:  -Optimize pain control   -Continue home medications, titrate as needed   -Monitor BP  -Low salt/cardiac diet when not  NPO  -IV hydralazine prn for SBP>160 or DBP>90       Continue current regimen and adjust accordingly        Generalized abdominal pain  Patient presented with worsening abdominal pain with associated nausea, vomiting, decreased p.o. intake, and constipation x2 weeks' duration and was found to have evidence of bowel obstruction associated with soft tissue mass.  CT abdomen/pelvis revealed an 11 cm soft tissue mass either arising from the cecum or distal small bowel consistent with malignancy causing a high-grade distal mechanical small bowel obstruction in addition to presence of peritoneal carcinomatosis.  Patient remains afebrile without leukocytosis and remains hemodynamically stable.  CMP, CBC, LFTs, and lipase within normal limits.  UA negative for UTI.  Patient status post NG tube insertion and currently on suction.  General surgery consulted and awaiting further evaluation/recommendations.  CEA and  ordered and pending prior to transfer.  Oncology consulted for malignant workup and to establish care.    Plan:  -Continue current pain regimen, titrate as needed  -advance diet per surgery            VTE Risk Mitigation (From admission, onward)         Ordered     enoxaparin injection 40 mg  Daily         12/10/22 2225     IP VTE HIGH RISK PATIENT  Once         12/10/22 2225     Place sequential compression device  Until discontinued         12/10/22 2225                      I have completed this tele-visit with the assistance of a telepresenter.    The attending portion of this evaluation, treatment, and documentation was performed per Shakila Guerra NP via Telemedicine AudioVisual using the secure [employer] software platform with 2 way audio/video. The provider was located off-site and the patient is located in the hospital. The aforementioned video software was utilized to document the relevant history and physical exam    Shakila Guerra NP  Department of Hospital Medicine   'formerly [facility] Surg

## 2022-12-18 LAB
ANION GAP SERPL CALC-SCNC: 14 MMOL/L (ref 8–16)
BASOPHILS # BLD AUTO: 0.03 K/UL (ref 0–0.2)
BASOPHILS NFR BLD: 0.5 % (ref 0–1.9)
BUN SERPL-MCNC: 5 MG/DL (ref 6–20)
CALCIUM SERPL-MCNC: 9.6 MG/DL (ref 8.7–10.5)
CHLORIDE SERPL-SCNC: 95 MMOL/L (ref 95–110)
CO2 SERPL-SCNC: 25 MMOL/L (ref 23–29)
CREAT SERPL-MCNC: 0.6 MG/DL (ref 0.5–1.4)
DIFFERENTIAL METHOD: ABNORMAL
EOSINOPHIL # BLD AUTO: 0.1 K/UL (ref 0–0.5)
EOSINOPHIL NFR BLD: 1.5 % (ref 0–8)
ERYTHROCYTE [DISTWIDTH] IN BLOOD BY AUTOMATED COUNT: 13.9 % (ref 11.5–14.5)
EST. GFR  (NO RACE VARIABLE): >60 ML/MIN/1.73 M^2
GLUCOSE SERPL-MCNC: 117 MG/DL (ref 70–110)
HCT VFR BLD AUTO: 36.5 % (ref 37–48.5)
HGB BLD-MCNC: 11.4 G/DL (ref 12–16)
IMM GRANULOCYTES # BLD AUTO: 0.02 K/UL (ref 0–0.04)
IMM GRANULOCYTES NFR BLD AUTO: 0.3 % (ref 0–0.5)
LYMPHOCYTES # BLD AUTO: 0.9 K/UL (ref 1–4.8)
LYMPHOCYTES NFR BLD: 15.6 % (ref 18–48)
MAGNESIUM SERPL-MCNC: 1.8 MG/DL (ref 1.6–2.6)
MCH RBC QN AUTO: 28.1 PG (ref 27–31)
MCHC RBC AUTO-ENTMCNC: 31.2 G/DL (ref 32–36)
MCV RBC AUTO: 90 FL (ref 82–98)
MONOCYTES # BLD AUTO: 0.8 K/UL (ref 0.3–1)
MONOCYTES NFR BLD: 13.5 % (ref 4–15)
NEUTROPHILS # BLD AUTO: 4.1 K/UL (ref 1.8–7.7)
NEUTROPHILS NFR BLD: 68.6 % (ref 38–73)
NRBC BLD-RTO: 0 /100 WBC
PHOSPHATE SERPL-MCNC: 3.5 MG/DL (ref 2.7–4.5)
PLATELET # BLD AUTO: 405 K/UL (ref 150–450)
PMV BLD AUTO: 9.6 FL (ref 9.2–12.9)
POCT GLUCOSE: 110 MG/DL (ref 70–110)
POCT GLUCOSE: 121 MG/DL (ref 70–110)
POCT GLUCOSE: 125 MG/DL (ref 70–110)
POCT GLUCOSE: 89 MG/DL (ref 70–110)
POTASSIUM SERPL-SCNC: 4 MMOL/L (ref 3.5–5.1)
RBC # BLD AUTO: 4.05 M/UL (ref 4–5.4)
SODIUM SERPL-SCNC: 134 MMOL/L (ref 136–145)
WBC # BLD AUTO: 5.98 K/UL (ref 3.9–12.7)

## 2022-12-18 PROCEDURE — 83735 ASSAY OF MAGNESIUM: CPT | Performed by: COLON & RECTAL SURGERY

## 2022-12-18 PROCEDURE — 99024 PR POST-OP FOLLOW-UP VISIT: ICD-10-PCS | Mod: ,,, | Performed by: COLON & RECTAL SURGERY

## 2022-12-18 PROCEDURE — 25000003 PHARM REV CODE 250: Performed by: SURGERY

## 2022-12-18 PROCEDURE — 99024 POSTOP FOLLOW-UP VISIT: CPT | Mod: ,,, | Performed by: COLON & RECTAL SURGERY

## 2022-12-18 PROCEDURE — 36415 COLL VENOUS BLD VENIPUNCTURE: CPT | Performed by: COLON & RECTAL SURGERY

## 2022-12-18 PROCEDURE — 11000001 HC ACUTE MED/SURG PRIVATE ROOM

## 2022-12-18 PROCEDURE — 25000003 PHARM REV CODE 250: Performed by: NURSE PRACTITIONER

## 2022-12-18 PROCEDURE — 25000003 PHARM REV CODE 250

## 2022-12-18 PROCEDURE — 63600175 PHARM REV CODE 636 W HCPCS: Performed by: SURGERY

## 2022-12-18 PROCEDURE — 94761 N-INVAS EAR/PLS OXIMETRY MLT: CPT

## 2022-12-18 PROCEDURE — 25000003 PHARM REV CODE 250: Performed by: COLON & RECTAL SURGERY

## 2022-12-18 PROCEDURE — 80048 BASIC METABOLIC PNL TOTAL CA: CPT | Performed by: COLON & RECTAL SURGERY

## 2022-12-18 PROCEDURE — 84100 ASSAY OF PHOSPHORUS: CPT | Performed by: COLON & RECTAL SURGERY

## 2022-12-18 PROCEDURE — 63600175 PHARM REV CODE 636 W HCPCS

## 2022-12-18 PROCEDURE — 85025 COMPLETE CBC W/AUTO DIFF WBC: CPT | Performed by: COLON & RECTAL SURGERY

## 2022-12-18 RX ORDER — SULFAMETHOXAZOLE AND TRIMETHOPRIM 800; 160 MG/1; MG/1
1 TABLET ORAL 2 TIMES DAILY
Status: DISCONTINUED | OUTPATIENT
Start: 2022-12-18 | End: 2022-12-19 | Stop reason: HOSPADM

## 2022-12-18 RX ADMIN — ENOXAPARIN SODIUM 40 MG: 40 INJECTION SUBCUTANEOUS at 05:12

## 2022-12-18 RX ADMIN — MORPHINE SULFATE 2 MG: 2 INJECTION, SOLUTION INTRAMUSCULAR; INTRAVENOUS at 09:12

## 2022-12-18 RX ADMIN — OXYCODONE HYDROCHLORIDE 10 MG: 5 TABLET ORAL at 05:12

## 2022-12-18 RX ADMIN — OXYCODONE HYDROCHLORIDE 10 MG: 5 TABLET ORAL at 01:12

## 2022-12-18 RX ADMIN — SULFAMETHOXAZOLE AND TRIMETHOPRIM 1 TABLET: 800; 160 TABLET ORAL at 08:12

## 2022-12-18 RX ADMIN — POLYETHYLENE GLYCOL 3350 17 G: 17 POWDER, FOR SOLUTION ORAL at 09:12

## 2022-12-18 RX ADMIN — SENNOSIDES AND DOCUSATE SODIUM 1 TABLET: 50; 8.6 TABLET ORAL at 08:12

## 2022-12-18 RX ADMIN — SULFAMETHOXAZOLE AND TRIMETHOPRIM 1 TABLET: 800; 160 TABLET ORAL at 01:12

## 2022-12-18 RX ADMIN — POLYETHYLENE GLYCOL 3350 17 G: 17 POWDER, FOR SOLUTION ORAL at 08:12

## 2022-12-18 RX ADMIN — PANTOPRAZOLE SODIUM 40 MG: 40 TABLET, DELAYED RELEASE ORAL at 09:12

## 2022-12-18 RX ADMIN — SENNOSIDES AND DOCUSATE SODIUM 1 TABLET: 50; 8.6 TABLET ORAL at 09:12

## 2022-12-18 RX ADMIN — DIPHENHYDRAMINE HYDROCHLORIDE 25 MG: 25 CAPSULE ORAL at 08:12

## 2022-12-18 RX ADMIN — OXYCODONE HYDROCHLORIDE 10 MG: 5 TABLET ORAL at 08:12

## 2022-12-18 RX ADMIN — ATORVASTATIN CALCIUM 20 MG: 10 TABLET, FILM COATED ORAL at 09:12

## 2022-12-18 RX ADMIN — Medication 6 MG: at 08:12

## 2022-12-18 NOTE — NURSING
Pt remains free of falls/injury. Safety precautions maintained. Clear diet tolerated. VSS. No S/S of distress noted at this time. Pt walked halls and sat in chair majority of shift.  No SOB or pain reported.  12 hour chart check complete. Will continue to monitor.

## 2022-12-18 NOTE — PLAN OF CARE
Patient remains free of falls and injuries during shift. Pain managed with PRN medications. Miralax and stool softener given as ordered. Awaiting BM. Chart check complete. Plan of care ongoing.

## 2022-12-18 NOTE — SUBJECTIVE & OBJECTIVE
Interval History: Worsening lower incisional pain and redness. Passing flatus, no BM. No nausea or vomiting.     Medications:  Continuous Infusions:  Scheduled Meds:   amLODIPine  5 mg Oral Daily    atorvastatin  20 mg Oral Daily    enoxaparin  40 mg Subcutaneous Daily    hydroCHLOROthiazide  12.5 mg Oral Daily    losartan  100 mg Oral Daily    pantoprazole  40 mg Oral Daily    polyethylene glycol  17 g Oral BID    senna-docusate 8.6-50 mg  1 tablet Oral BID     PRN Meds:acetaminophen, albuterol-ipratropium, aluminum-magnesium hydroxide-simethicone, dextrose 10%, dextrose 10%, diphenhydrAMINE, glucagon (human recombinant), glucagon (human recombinant), glucose, glucose, insulin aspart U-100, melatonin, morphine, naloxone, naloxone, ondansetron, oxyCODONE, oxyCODONE, promethazine, sodium chloride 0.9%     Review of patient's allergies indicates:  No Known Allergies  Objective:     Vital Signs (Most Recent):  Temp: 98.2 °F (36.8 °C) (12/18/22 0749)  Pulse: 76 (12/18/22 0749)  Resp: 20 (12/18/22 0910)  BP: 101/62 (12/18/22 0749)  SpO2: 96 % (12/18/22 0749) Vital Signs (24h Range):  Temp:  [98.2 °F (36.8 °C)-98.5 °F (36.9 °C)] 98.2 °F (36.8 °C)  Pulse:  [64-82] 76  Resp:  [16-20] 20  SpO2:  [94 %-96 %] 96 %  BP: ()/(50-63) 101/62     Weight: 106.9 kg (235 lb 10.8 oz)  Body mass index is 39.22 kg/m².    Intake/Output - Last 3 Shifts         12/16 0700 12/17 0659 12/17 0700 12/18 0659 12/18 0700 12/19 0659           Urine Occurrence 1 x              Physical Exam  Constitutional:       Appearance: She is well-developed.   HENT:      Head: Normocephalic and atraumatic.   Eyes:      Conjunctiva/sclera: Conjunctivae normal.   Neck:      Thyroid: No thyromegaly.   Cardiovascular:      Rate and Rhythm: Normal rate and regular rhythm.   Pulmonary:      Effort: Pulmonary effort is normal. No respiratory distress.   Abdominal:      Comments: Soft, mild distention, incision clean and dry in upper portion with lower  portion with worsening surrounding erythema and induration (staples removed with some mild cloudy fluid and old blood removed, no bandar purulence); inferior portion packed with gauze   Musculoskeletal:         General: No tenderness. Normal range of motion.      Cervical back: Normal range of motion.   Skin:     General: Skin is warm and dry.      Capillary Refill: Capillary refill takes less than 2 seconds.      Findings: No rash.   Neurological:      Mental Status: She is alert and oriented to person, place, and time.       Significant Labs:  I have reviewed all pertinent lab results within the past 24 hours.  CBC:   Recent Labs   Lab 12/18/22 0903   WBC 5.98   RBC 4.05   HGB 11.4*   HCT 36.5*      MCV 90   MCH 28.1   MCHC 31.2*     BMP:   Recent Labs   Lab 12/18/22 0903   *   *   K 4.0   CL 95   CO2 25   BUN 5*   CREATININE 0.6   CALCIUM 9.6   MG 1.8     CMP:   Recent Labs   Lab 12/16/22 0918 12/18/22 0903   * 117*   CALCIUM 9.5 9.6   ALBUMIN 2.9*  --    PROT 7.0  --    * 134*   K 4.2 4.0   CO2 26 25   CL 96 95   BUN 9 5*   CREATININE 0.7 0.6   ALKPHOS 57  --    ALT 13  --    AST 21  --    BILITOT 1.2*  --      LFTs:   Recent Labs   Lab 12/16/22 0918   ALT 13   AST 21   ALKPHOS 57   BILITOT 1.2*   PROT 7.0   ALBUMIN 2.9*       Significant Diagnostics:  I have reviewed all pertinent imaging results/findings within the past 24 hours.

## 2022-12-18 NOTE — ASSESSMENT & PLAN NOTE
S/p ex lap with ileocolonic diversion and biopsy of intra-abdominal mass    - Continue clear liquids, monitor for tolerance and advance once starts having full return of bowel function  - staples removed for induration and erythema and pain. Will start abx  - Await full return of bowel function, bowel regimen/ miralax bid  - Encouraged OOB, ambulation  - DVT and GI prophylaxis  - Incentive spirometry  - p.o. pain medication with IV breakthrough

## 2022-12-18 NOTE — PROGRESS NOTES
[facility] Surg  General Surgery  Progress Note    Subjective:     History of Present Illness:  56 y/o female referred for right lower quadrant abdominal mass causing bowel obstruction. Patient reports constipation and decreased appetite. Denies any weight loss. Recently began having abdominal pain and bloating. CT in ER showing intraabdominal mass in right lower quadrant causing bowel obstruction.  Only previous abdominal/pelvic surgery was a tubal ligation.      Post-Op Info:  Procedure(s) (LRB):  LAPAROTOMY, EXPLORATORY (N/A)  CYSTOSCOPY, WITH URETERAL STENT INSERTION (Right)  BLOCK, TRANSVERSUS ABDOMINIS PLANE (N/A)  PYELOGRAM, RETROGRADE (Right)   7 Days Post-Op     Interval History: Worsening lower incisional pain and redness. Passing flatus, no BM. No nausea or vomiting.     Medications:  Continuous Infusions:  Scheduled Meds:   amLODIPine  5 mg Oral Daily    atorvastatin  20 mg Oral Daily    enoxaparin  40 mg Subcutaneous Daily    hydroCHLOROthiazide  12.5 mg Oral Daily    losartan  100 mg Oral Daily    pantoprazole  40 mg Oral Daily    polyethylene glycol  17 g Oral BID    senna-docusate 8.6-50 mg  1 tablet Oral BID     PRN Meds:acetaminophen, albuterol-ipratropium, aluminum-magnesium hydroxide-simethicone, dextrose 10%, dextrose 10%, diphenhydrAMINE, glucagon (human recombinant), glucagon (human recombinant), glucose, glucose, insulin aspart U-100, melatonin, morphine, naloxone, naloxone, ondansetron, oxyCODONE, oxyCODONE, promethazine, sodium chloride 0.9%     Review of patient's allergies indicates:  No Known Allergies  Objective:     Vital Signs (Most Recent):  Temp: 98.2 °F (36.8 °C) (12/18/22 0749)  Pulse: 76 (12/18/22 0749)  Resp: 20 (12/18/22 0910)  BP: 101/62 (12/18/22 0749)  SpO2: 96 % (12/18/22 0749) Vital Signs (24h Range):  Temp:  [98.2 °F (36.8 °C)-98.5 °F (36.9 °C)] 98.2 °F (36.8 °C)  Pulse:  [64-82] 76  Resp:  [16-20] 20  SpO2:  [94 %-96 %] 96 %  BP: ()/(50-63) 101/62      Weight: 106.9 kg (235 lb 10.8 oz)  Body mass index is 39.22 kg/m².    Intake/Output - Last 3 Shifts         12/16 0700 12/17 0659 12/17 0700 12/18 0659 12/18 0700 12/19 0659           Urine Occurrence 1 x              Physical Exam  Constitutional:       Appearance: She is well-developed.   HENT:      Head: Normocephalic and atraumatic.   Eyes:      Conjunctiva/sclera: Conjunctivae normal.   Neck:      Thyroid: No thyromegaly.   Cardiovascular:      Rate and Rhythm: Normal rate and regular rhythm.   Pulmonary:      Effort: Pulmonary effort is normal. No respiratory distress.   Abdominal:      Comments: Soft, mild distention, incision clean and dry in upper portion with lower portion with worsening surrounding erythema and induration (staples removed with some mild cloudy fluid and old blood removed, no bandar purulence); inferior portion packed with gauze   Musculoskeletal:         General: No tenderness. Normal range of motion.      Cervical back: Normal range of motion.   Skin:     General: Skin is warm and dry.      Capillary Refill: Capillary refill takes less than 2 seconds.      Findings: No rash.   Neurological:      Mental Status: She is alert and oriented to person, place, and time.       Significant Labs:  I have reviewed all pertinent lab results within the past 24 hours.  CBC:   Recent Labs   Lab 12/18/22  0903   WBC 5.98   RBC 4.05   HGB 11.4*   HCT 36.5*      MCV 90   MCH 28.1   MCHC 31.2*     BMP:   Recent Labs   Lab 12/18/22  0903   *   *   K 4.0   CL 95   CO2 25   BUN 5*   CREATININE 0.6   CALCIUM 9.6   MG 1.8     CMP:   Recent Labs   Lab 12/16/22 0918 12/18/22  0903   * 117*   CALCIUM 9.5 9.6   ALBUMIN 2.9*  --    PROT 7.0  --    * 134*   K 4.2 4.0   CO2 26 25   CL 96 95   BUN 9 5*   CREATININE 0.7 0.6   ALKPHOS 57  --    ALT 13  --    AST 21  --    BILITOT 1.2*  --      LFTs:   Recent Labs   Lab 12/16/22 0918   ALT 13   AST 21   ALKPHOS 57   BILITOT 1.2*    PROT 7.0   ALBUMIN 2.9*       Significant Diagnostics:  I have reviewed all pertinent imaging results/findings within the past 24 hours.    Assessment/Plan:     * Bowel obstruction  S/p ex lap with ileocolonic diversion and biopsy of intra-abdominal mass    - Continue clear liquids, monitor for tolerance and advance once starts having full return of bowel function  - staples removed for induration and erythema and pain. Will start abx  - Await full return of bowel function, bowel regimen/ miralax bid  - Encouraged OOB, ambulation  - DVT and GI prophylaxis  - Incentive spirometry  - p.o. pain medication with IV breakthrough    Class 2 severe obesity due to excess calories with serious comorbidity and body mass index (BMI) of 36.0 to 36.9 in adult  Dietary modifications    Type 2 diabetes mellitus, with long-term current use of insulin  Medical mgmt    Primary hypertension  Medical mgmt        Antonio Miller MD  General Surgery  [facility] Med Surg

## 2022-12-18 NOTE — PLAN OF CARE
Problem: Adult Inpatient Plan of Care  Goal: Plan of Care Review  Outcome: Ongoing, Progressing  Goal: Patient-Specific Goal (Individualized)  Outcome: Ongoing, Progressing  Goal: Absence of Hospital-Acquired Illness or Injury  Outcome: Ongoing, Progressing  Goal: Optimal Comfort and Wellbeing  Outcome: Ongoing, Progressing  Goal: Readiness for Transition of Care  Outcome: Ongoing, Progressing     Problem: Diabetes Comorbidity  Goal: Blood Glucose Level Within Targeted Range  Outcome: Ongoing, Progressing     Problem: Infection  Goal: Absence of Infection Signs and Symptoms  Outcome: Ongoing, Progressing     Problem: Pain Acute  Goal: Acceptable Pain Control and Functional Ability  Outcome: Ongoing, Progressing     Problem: Fall Injury Risk  Goal: Absence of Fall and Fall-Related Injury  Outcome: Ongoing, Progressing

## 2022-12-19 VITALS
BODY MASS INDEX: 37.61 KG/M2 | RESPIRATION RATE: 18 BRPM | OXYGEN SATURATION: 96 % | HEIGHT: 65 IN | HEART RATE: 100 BPM | WEIGHT: 225.75 LBS | SYSTOLIC BLOOD PRESSURE: 109 MMHG | DIASTOLIC BLOOD PRESSURE: 69 MMHG | TEMPERATURE: 98 F

## 2022-12-19 LAB
ANION GAP SERPL CALC-SCNC: 12 MMOL/L (ref 8–16)
BASOPHILS # BLD AUTO: 0.01 K/UL (ref 0–0.2)
BASOPHILS NFR BLD: 0.2 % (ref 0–1.9)
BUN SERPL-MCNC: 4 MG/DL (ref 6–20)
CALCIUM SERPL-MCNC: 9.5 MG/DL (ref 8.7–10.5)
CHLORIDE SERPL-SCNC: 98 MMOL/L (ref 95–110)
CO2 SERPL-SCNC: 26 MMOL/L (ref 23–29)
CREAT SERPL-MCNC: 0.6 MG/DL (ref 0.5–1.4)
DIFFERENTIAL METHOD: ABNORMAL
EOSINOPHIL # BLD AUTO: 0.1 K/UL (ref 0–0.5)
EOSINOPHIL NFR BLD: 2 % (ref 0–8)
ERYTHROCYTE [DISTWIDTH] IN BLOOD BY AUTOMATED COUNT: 13.8 % (ref 11.5–14.5)
EST. GFR  (NO RACE VARIABLE): >60 ML/MIN/1.73 M^2
GLUCOSE SERPL-MCNC: 96 MG/DL (ref 70–110)
HCT VFR BLD AUTO: 34.7 % (ref 37–48.5)
HGB BLD-MCNC: 11.1 G/DL (ref 12–16)
IMM GRANULOCYTES # BLD AUTO: 0.01 K/UL (ref 0–0.04)
IMM GRANULOCYTES NFR BLD AUTO: 0.2 % (ref 0–0.5)
LYMPHOCYTES # BLD AUTO: 0.9 K/UL (ref 1–4.8)
LYMPHOCYTES NFR BLD: 16 % (ref 18–48)
MAGNESIUM SERPL-MCNC: 1.8 MG/DL (ref 1.6–2.6)
MCH RBC QN AUTO: 28.7 PG (ref 27–31)
MCHC RBC AUTO-ENTMCNC: 32 G/DL (ref 32–36)
MCV RBC AUTO: 90 FL (ref 82–98)
MONOCYTES # BLD AUTO: 1 K/UL (ref 0.3–1)
MONOCYTES NFR BLD: 17 % (ref 4–15)
NEUTROPHILS # BLD AUTO: 3.8 K/UL (ref 1.8–7.7)
NEUTROPHILS NFR BLD: 64.6 % (ref 38–73)
NRBC BLD-RTO: 0 /100 WBC
PHOSPHATE SERPL-MCNC: 3.5 MG/DL (ref 2.7–4.5)
PLATELET # BLD AUTO: 429 K/UL (ref 150–450)
PMV BLD AUTO: 10.2 FL (ref 9.2–12.9)
POCT GLUCOSE: 119 MG/DL (ref 70–110)
POCT GLUCOSE: 92 MG/DL (ref 70–110)
POTASSIUM SERPL-SCNC: 3.6 MMOL/L (ref 3.5–5.1)
RBC # BLD AUTO: 3.87 M/UL (ref 4–5.4)
SODIUM SERPL-SCNC: 136 MMOL/L (ref 136–145)
WBC # BLD AUTO: 5.88 K/UL (ref 3.9–12.7)

## 2022-12-19 PROCEDURE — 85025 COMPLETE CBC W/AUTO DIFF WBC: CPT | Performed by: COLON & RECTAL SURGERY

## 2022-12-19 PROCEDURE — 84100 ASSAY OF PHOSPHORUS: CPT | Performed by: COLON & RECTAL SURGERY

## 2022-12-19 PROCEDURE — 25000003 PHARM REV CODE 250: Performed by: COLON & RECTAL SURGERY

## 2022-12-19 PROCEDURE — 80048 BASIC METABOLIC PNL TOTAL CA: CPT | Performed by: COLON & RECTAL SURGERY

## 2022-12-19 PROCEDURE — 25000003 PHARM REV CODE 250: Performed by: SURGERY

## 2022-12-19 PROCEDURE — 36415 COLL VENOUS BLD VENIPUNCTURE: CPT | Performed by: COLON & RECTAL SURGERY

## 2022-12-19 PROCEDURE — 94761 N-INVAS EAR/PLS OXIMETRY MLT: CPT

## 2022-12-19 PROCEDURE — 25000003 PHARM REV CODE 250

## 2022-12-19 PROCEDURE — 83735 ASSAY OF MAGNESIUM: CPT | Performed by: COLON & RECTAL SURGERY

## 2022-12-19 RX ORDER — POLYETHYLENE GLYCOL 3350 17 G/17G
17 POWDER, FOR SOLUTION ORAL 2 TIMES DAILY
Qty: 238 G | Refills: 0 | Status: SHIPPED | OUTPATIENT
Start: 2022-12-19 | End: 2022-12-29

## 2022-12-19 RX ORDER — AMOXICILLIN 250 MG
1 CAPSULE ORAL 2 TIMES DAILY
Qty: 20 TABLET | Refills: 0 | Status: SHIPPED | OUTPATIENT
Start: 2022-12-19 | End: 2022-12-29

## 2022-12-19 RX ORDER — OXYCODONE HYDROCHLORIDE 10 MG/1
10 TABLET ORAL EVERY 8 HOURS PRN
Qty: 15 TABLET | Refills: 0 | Status: SHIPPED | OUTPATIENT
Start: 2022-12-19 | End: 2022-12-21

## 2022-12-19 RX ORDER — SULFAMETHOXAZOLE AND TRIMETHOPRIM 800; 160 MG/1; MG/1
1 TABLET ORAL 2 TIMES DAILY
Qty: 10 TABLET | Refills: 0 | Status: SHIPPED | OUTPATIENT
Start: 2022-12-19 | End: 2022-12-24

## 2022-12-19 RX ADMIN — OXYCODONE HYDROCHLORIDE 10 MG: 5 TABLET ORAL at 08:12

## 2022-12-19 RX ADMIN — PANTOPRAZOLE SODIUM 40 MG: 40 TABLET, DELAYED RELEASE ORAL at 08:12

## 2022-12-19 RX ADMIN — POLYETHYLENE GLYCOL 3350 17 G: 17 POWDER, FOR SOLUTION ORAL at 08:12

## 2022-12-19 RX ADMIN — ATORVASTATIN CALCIUM 20 MG: 10 TABLET, FILM COATED ORAL at 08:12

## 2022-12-19 RX ADMIN — SULFAMETHOXAZOLE AND TRIMETHOPRIM 1 TABLET: 800; 160 TABLET ORAL at 08:12

## 2022-12-19 RX ADMIN — SENNOSIDES AND DOCUSATE SODIUM 1 TABLET: 50; 8.6 TABLET ORAL at 08:12

## 2022-12-19 NOTE — ASSESSMENT & PLAN NOTE
S/p ex lap with ileocolonic diversion and biopsy of intra-abdominal mass    - regular diet  - daily dressing change wet to dry gauze  - Encouraged OOB, ambulation  - DVT and GI prophylaxis  - Incentive spirometry  - p.o. pain medication with IV breakthrough  -ok to discharge today if tolerating diet

## 2022-12-19 NOTE — SUBJECTIVE & OBJECTIVE
Interval History: improved incisional pain, having bowel movements overnight, tolerating liquids    Medications:  Continuous Infusions:  Scheduled Meds:   amLODIPine  5 mg Oral Daily    atorvastatin  20 mg Oral Daily    enoxaparin  40 mg Subcutaneous Daily    hydroCHLOROthiazide  12.5 mg Oral Daily    losartan  100 mg Oral Daily    pantoprazole  40 mg Oral Daily    polyethylene glycol  17 g Oral BID    senna-docusate 8.6-50 mg  1 tablet Oral BID    sulfamethoxazole-trimethoprim 800-160mg  1 tablet Oral BID     PRN Meds:acetaminophen, albuterol-ipratropium, aluminum-magnesium hydroxide-simethicone, dextrose 10%, dextrose 10%, diphenhydrAMINE, glucagon (human recombinant), glucagon (human recombinant), glucose, glucose, insulin aspart U-100, melatonin, morphine, naloxone, naloxone, ondansetron, oxyCODONE, oxyCODONE, promethazine, sodium chloride 0.9%     Review of patient's allergies indicates:  No Known Allergies  Objective:     Vital Signs (Most Recent):  Temp: 99.1 °F (37.3 °C) (12/19/22 0724)  Pulse: 88 (12/19/22 0724)  Resp: 20 (12/19/22 0724)  BP: (!) 91/54 (12/19/22 0724)  SpO2: 96 % (12/19/22 0724) Vital Signs (24h Range):  Temp:  [97.6 °F (36.4 °C)-99.4 °F (37.4 °C)] 99.1 °F (37.3 °C)  Pulse:  [69-93] 88  Resp:  [18-20] 20  SpO2:  [90 %-97 %] 96 %  BP: ()/(51-66) 91/54     Weight: 102.4 kg (225 lb 12 oz)  Body mass index is 37.57 kg/m².    Intake/Output - Last 3 Shifts         12/17 0700  12/18 0659 12/18 0700 12/19 0659 12/19 0700 12/20 0659    P.O.  1430     Total Intake(mL/kg)  1430 (14)     Net  +1430            Urine Occurrence  12 x     Stool Occurrence  1 x             Physical Exam  Constitutional:       Appearance: She is well-developed.   HENT:      Head: Normocephalic and atraumatic.   Eyes:      Conjunctiva/sclera: Conjunctivae normal.   Neck:      Thyroid: No thyromegaly.   Cardiovascular:      Rate and Rhythm: Normal rate and regular rhythm.   Pulmonary:      Effort: Pulmonary effort is  normal. No respiratory distress.   Abdominal:      Comments: Soft, mild distention, incision clean and dry in upper portion with lower portion with mild improving erythema and serous drainage, couple staples removed with dressing change of wet to dry gauze   Musculoskeletal:         General: No tenderness. Normal range of motion.      Cervical back: Normal range of motion.   Skin:     General: Skin is warm and dry.      Capillary Refill: Capillary refill takes less than 2 seconds.      Findings: No rash.   Neurological:      Mental Status: She is alert and oriented to person, place, and time.       Significant Labs:  I have reviewed all pertinent lab results within the past 24 hours.  CBC:   Recent Labs   Lab 12/19/22  0606   WBC 5.88   RBC 3.87*   HGB 11.1*   HCT 34.7*      MCV 90   MCH 28.7   MCHC 32.0       BMP:   Recent Labs   Lab 12/19/22  0606   GLU 96      K 3.6   CL 98   CO2 26   BUN 4*   CREATININE 0.6   CALCIUM 9.5   MG 1.8       CMP:   Recent Labs   Lab 12/16/22  0918 12/18/22  0903 12/19/22  0606   *   < > 96   CALCIUM 9.5   < > 9.5   ALBUMIN 2.9*  --   --    PROT 7.0  --   --    *   < > 136   K 4.2   < > 3.6   CO2 26   < > 26   CL 96   < > 98   BUN 9   < > 4*   CREATININE 0.7   < > 0.6   ALKPHOS 57  --   --    ALT 13  --   --    AST 21  --   --    BILITOT 1.2*  --   --     < > = values in this interval not displayed.       LFTs:   Recent Labs   Lab 12/16/22 0918   ALT 13   AST 21   ALKPHOS 57   BILITOT 1.2*   PROT 7.0   ALBUMIN 2.9*         Significant Diagnostics:  I have reviewed all pertinent imaging results/findings within the past 24 hours.

## 2022-12-19 NOTE — PLAN OF CARE
[facility] - Med Surg  Discharge Final Note    Primary Care Provider: SANDEEP Perdue    Expected Discharge Date: 12/19/2022    Final Discharge Note (most recent)       Final Note - 12/19/22 1304          Final Note    Assessment Type Final Discharge Note     Anticipated Discharge Disposition Home or Self Care     Hospital Resources/Appts/Education Provided Appointments scheduled and added to AVS                     Important Message from Medicare             Contact Info       Yaneth Lopez PA-C   Specialty: General Surgery    [address] [address] [place] [address]   Phone: [phone]       Next Steps: Follow up on 12/28/2022    Instructions: For suture removal, For wound re-check          DC DISPO: HOME  F/U APPT: DEC 28TH

## 2022-12-19 NOTE — ASSESSMENT & PLAN NOTE
POD5 s/p ex lap with ileocolonic diversion and biopsy of intra-abdominal mass  F/u surgery recs  Clear liquids, advance as tolerated  Pain control  Bowel regimen   Tolerated regular diet   Cleared for d/c by surgeon

## 2022-12-19 NOTE — PLAN OF CARE
Walker order sent to [facility] and liaison, Brigid, notified. CM provided [facility] contact info on AVS for patient to f/u with.

## 2022-12-19 NOTE — PROGRESS NOTES
Pt given discharge instructions, follow up discussed. Awaiting walker to be delivered and prescriptions to be delivered for final DC.

## 2022-12-19 NOTE — SUBJECTIVE & OBJECTIVE
Interval History: patient reports  large BM this am. Continue to advance diet as tolerated. Staples removed today per general surgery. Encourage OOB.     Review of Systems   Gastrointestinal:  Negative for abdominal pain, nausea and vomiting.   Objective:     Vital Signs (Most Recent):  Temp: 97.6 °F (36.4 °C) (12/18/22 2029)  Pulse: 82 (12/18/22 2123)  Resp: 18 (12/18/22 2123)  BP: 115/66 (12/18/22 2029)  SpO2: (!) 92 % (12/18/22 2123)   Vital Signs (24h Range):  Temp:  [97.6 °F (36.4 °C)-99.4 °F (37.4 °C)] 97.6 °F (36.4 °C)  Pulse:  [64-93] 82  Resp:  [18-20] 18  SpO2:  [90 %-97 %] 92 %  BP: ()/(50-66) 115/66     Weight: 106.9 kg (235 lb 10.8 oz)  Body mass index is 39.22 kg/m².    Intake/Output Summary (Last 24 hours) at 12/18/2022 2159  Last data filed at 12/18/2022 1808  Gross per 24 hour   Intake 1430 ml   Output --   Net 1430 ml      Physical Exam  Vitals and nursing note reviewed.   Constitutional:       Appearance: Normal appearance. She is not ill-appearing.   Pulmonary:      Effort: Pulmonary effort is normal. No respiratory distress.   Neurological:      General: No focal deficit present.      Mental Status: She is oriented to person, place, and time.   Psychiatric:         Mood and Affect: Mood normal.       Significant Labs: All pertinent labs within the past 24 hours have been reviewed.  CBC:   Recent Labs   Lab 12/18/22  0903   WBC 5.98   HGB 11.4*   HCT 36.5*        CMP:   Recent Labs   Lab 12/18/22  0903   *   K 4.0   CL 95   CO2 25   *   BUN 5*   CREATININE 0.6   CALCIUM 9.6   ANIONGAP 14       Significant Imaging: I have reviewed all pertinent imaging results/findings within the past 24 hours.

## 2022-12-19 NOTE — ASSESSMENT & PLAN NOTE
POD5 s/p ex lap with ileocolonic diversion and biopsy of intra-abdominal mass  F/u surgery recs  Clear liquids, advance as tolerated  Pain control  Awaiting bowel function to return. Laxatives prn    12/18   + BM this am per patient report  Advance diet as tolerated

## 2022-12-19 NOTE — DISCHARGE SUMMARY
[facility] Medicine  Discharge Summary      Patient Name: Becky Steen  MRN: [id number]  Patient Class: IP- Inpatient  Admission Date: 12/10/2022  Hospital Length of Stay: 9 days  Discharge Date and Time: No discharge date for patient encounter.  Attending Physician: Sherri Hoover MD   Discharging Provider: Sherri Hoover MD  Primary Care Provider: SANDEEP Perdue      HPI:   Becky Steen is a 57 y.o. female with a PMH  has a past medical history of Asthma, Diabetes mellitus, GERD (gastroesophageal reflux disease), HLD (hyperlipidemia), and Hypertension. who presented as a transfer from [facility] for higher level of care after patient presented with complaints of generalized abdominal pain with associated nausea and vomiting and was found to have evidence of a soft tissue mass arising from the cecum/distal small bowel causing bowel obstruction noted on CT abdomen/pelvis. Patient reported acute onset and progressively worsening generalized abdominal pain which began approximately 2 weeks prior to admission. She reported pain is intermittent in frequency and described as a sharp/labored like pains throughout her entire abdomen, currently rated 0/10 in severity but worsens to 10/10 in severity with no known alleviating or aggravating factors noted.  Associated symptoms included nausea, vomiting, constipation, and decreased oral intake but denied endorsing any fever, chills, sweats, chest pain, shortness a breath, dysuria, hematuria, hematochezia, melena, or neurological deficits.  Patient reported having small bowel movement yesterday with intermittent bowel movements throughout the week reported loose/watery nature.  Patient reported last colonoscopy was within 10 years with [facility] and was told to come back 10 years later for repeat.  Patient follows Dr. Alcala at [facility] for OBGYN with last reported mg obtain last year and Pap smear few months ago prior to  admission and reported both were normal.  Patient reports history of skin cancer in her father but is unaware of any other known family history of cancers.  Prior to onset of symptoms, patient reported being in her usual state of health no other concerns or complaints.  All other review of systems negative except as noted above.  Patient admitted to Hospital Medicine for continued medical management of bowel obstruction and further cancer workup. General Surgery aware of patient and awaiting further evaluation in the morning.     PCP: Kerry Aguilar        Procedure(s) (LRB):  LAPAROTOMY, EXPLORATORY (N/A)  CYSTOSCOPY, WITH URETERAL STENT INSERTION (Right)  BLOCK, TRANSVERSUS ABDOMINIS PLANE (N/A)  PYELOGRAM, RETROGRADE (Right)      Hospital Course:   Admitted for right lower quadrant abdominal mass causing bowel obstruction. POD5 S/p ex lap with ileocolonic diversion and biopsy of intra-abdominal mass. Tolerating clear liquid diet, denies abdominal pain, nausea, vomiting. Diet advanced to regular and patient tolerated well, started having BMs       Goals of Care Treatment Preferences:  Code Status: Full Code      Consults:   Consults (From admission, onward)          Status Ordering Provider     Inpatient virtual consult to Hospital Medicine  Once        Provider:  (Not yet assigned)    Completed KYARA PUGA     Inpatient consult to Hematology/Oncology  Once        Provider:  Kenton Christianson MD    Acknowledged AKI SUERO     Inpatient consult to General Surgery  Once        Provider:  Aki Suero MD    Completed YOLIS BAIG.            * Bowel obstruction  POD5 s/p ex lap with ileocolonic diversion and biopsy of intra-abdominal mass  F/u surgery recs  Clear liquids, advance as tolerated  Pain control  Bowel regimen   Tolerated regular diet   Cleared for d/c by surgeon    BP medications stopped on d/c given hypotension     Generalized abdominal pain  Patient presented with worsening abdominal pain  with associated nausea, vomiting, decreased p.o. intake, and constipation x2 weeks' duration and was found to have evidence of bowel obstruction associated with soft tissue mass.  CT abdomen/pelvis revealed an 11 cm soft tissue mass either arising from the cecum or distal small bowel consistent with malignancy causing a high-grade distal mechanical small bowel obstruction in addition to presence of peritoneal carcinomatosis.  Patient remains afebrile without leukocytosis and remains hemodynamically stable.  CMP, CBC, LFTs, and lipase within normal limits.  UA negative for UTI.  Patient status post NG tube insertion and currently on suction.  General surgery consulted and awaiting further evaluation/recommendations.  CEA and  ordered and pending prior to transfer.  Oncology consulted for malignant workup and to establish care.    Plan:  -Continue current pain regimen, titrate as needed  -advance diet per surgery            Final Active Diagnoses:    Diagnosis Date Noted POA    PRINCIPAL PROBLEM:  Bowel obstruction [K56.609] 12/10/2022 Yes    Wrist pain, acute, left [M25.532] 12/14/2022 Yes    Generalized abdominal pain [R10.84] 12/10/2022 Yes    Primary hypertension [I10] 12/10/2022 Yes    Gastroesophageal reflux disease without esophagitis [K21.9] 12/10/2022 Yes    Type 2 diabetes mellitus, with long-term current use of insulin [E11.9, Z79.4] 12/10/2022 Not Applicable    Asthma [J45.909] 12/10/2022 Yes    Class 2 severe obesity due to excess calories with serious comorbidity and body mass index (BMI) of 36.0 to 36.9 in adult [E66.01, Z68.36] 12/10/2022 Not Applicable      Problems Resolved During this Admission:    Diagnosis Date Noted Date Resolved POA    Nausea and vomiting [R11.2] 12/11/2022 12/13/2022 Yes    Right lower quadrant abdominal mass [R19.03] 12/11/2022 12/13/2022 Yes       Discharged Condition: good    Disposition:     Follow Up:   Follow-up Information       Yaneth Lopez PA-C Follow up on  12/28/2022.    Specialty: General Surgery  Why: For suture removal, For wound re-check  Contact information:  [address]  [place] [address] [phone]                           Patient Instructions:   No discharge procedures on file.    Significant Diagnostic Studies: Labs:   CMP   Recent Labs   Lab 12/18/22  0903 12/19/22  0606   * 136   K 4.0 3.6   CL 95 98   CO2 25 26   * 96   BUN 5* 4*   CREATININE 0.6 0.6   CALCIUM 9.6 9.5   ANIONGAP 14 12    and CBC   Recent Labs   Lab 12/18/22  0903 12/19/22  0606   WBC 5.98 5.88   HGB 11.4* 11.1*   HCT 36.5* 34.7*    429       Pending Diagnostic Studies:       Procedure Component Value Units Date/Time    Specimen to Pathology, Surgery General Surgery [[id number]] Collected: 12/11/22 1440    Order Status: Sent Lab Status: In process Updated: 12/12/22 1029    Specimen: Tissue            Medications:  Reconciled Home Medications:      Medication List        START taking these medications      oxyCODONE 10 mg Tab immediate release tablet  Commonly known as: ROXICODONE  Take 1 tablet (10 mg total) by mouth every 8 (eight) hours as needed (severe pain).     polyethylene glycol 17 gram/dose powder  Commonly known as: GLYCOLAX  Take 17 g by mouth 2 (two) times daily. for 10 days     senna-docusate 8.6-50 mg 8.6-50 mg per tablet  Commonly known as: PERICOLACE  Take 1 tablet by mouth 2 (two) times daily. for 10 days     sulfamethoxazole-trimethoprim 800-160mg 800-160 mg Tab  Commonly known as: BACTRIM DS  Take 1 tablet by mouth 2 (two) times daily. for 5 days            CONTINUE taking these medications      albuterol 90 mcg/actuation inhaler  Commonly known as: PROVENTIL/VENTOLIN HFA  Inhale 2 puffs into the lungs every 6 (six) hours as needed for Wheezing.     atorvastatin 20 MG tablet  Commonly known as: LIPITOR  Take 20 mg by mouth.     budesonide-formoterol 160-4.5 mcg 160-4.5 mcg/actuation Hfaa  Commonly known as: SYMBICORT  Symbicort 160  mcg-4.5 mcg/actuation HFA aerosol inhaler     ergocalciferol 50,000 unit Cap  Commonly known as: ERGOCALCIFEROL  ergocalciferol (vitamin D2) 1,250 mcg (50,000 unit) capsule   Take 1 capsule every week by oral route.     fluticasone propionate 50 mcg/actuation nasal spray  Commonly known as: FLONASE  SMARTSI Spray(s) Both Nares Every Morning     loratadine 10 mg tablet  Commonly known as: CLARITIN  loratadine 10 mg tablet   Take 1 tablet every day by oral route.     metFORMIN 500 MG ER 24hr tablet  Commonly known as: GLUCOPHAGE-XR  metformin  mg tablet,extended release 24 hr   TAKE 1 TABLET BY MOUTH EVERY DAY     montelukast 10 mg tablet  Commonly known as: SINGULAIR  montelukast 10 mg tablet     omeprazole 40 MG capsule  Commonly known as: PRILOSEC  omeprazole 40 mg capsule,delayed release     XATMEP ORAL  methotrexate            STOP taking these medications      amLODIPine 5 MG tablet  Commonly known as: NORVASC     celecoxib 200 MG capsule  Commonly known as: CeleBREX     HYDROcodone-acetaminophen 7.5-325 mg per tablet  Commonly known as: NORCO     LEVEMIR FLEXTOUCH U-100 INSULN 100 unit/mL (3 mL) Inpn pen  Generic drug: insulin detemir U-100     losartan-hydrochlorothiazide 100-12.5 mg 100-12.5 mg Tab  Commonly known as: HYZAAR              Indwelling Lines/Drains at time of discharge:   Lines/Drains/Airways       None                   Time spent on the discharge of patient: 35  minutes         The attending portion of this evaluation, treatment, and documentation was performed per Sherri Hoover MD via Telemedicine AudioVisual using the secure [employer] software platform with 2 way audio/video. The provider was located off-site and the patient is located in the hospital. The aforementioned video software was utilized to document the relevant history and physical exam    Sherri Hoover MD  Department of Hospital Medicine  [facility]

## 2022-12-19 NOTE — PROGRESS NOTES
[facility] Medicine  Telemedicine Progress Note    Patient Name: Becky Steen  MRN: [id number]  Patient Class: IP- Inpatient   Admission Date: 12/10/2022  Length of Stay: 8 days  Attending Physician: Geovanna Ridley MD  Primary Care Provider: SANDEEP Perdue          Subjective:     Principal Problem:Bowel obstruction        HPI:  Becky Steen is a 57 y.o. female with a PMH  has a past medical history of Asthma, Diabetes mellitus, GERD (gastroesophageal reflux disease), HLD (hyperlipidemia), and Hypertension. who presented as a transfer from [facility] for higher level of care after patient presented with complaints of generalized abdominal pain with associated nausea and vomiting and was found to have evidence of a soft tissue mass arising from the cecum/distal small bowel causing bowel obstruction noted on CT abdomen/pelvis. Patient reported acute onset and progressively worsening generalized abdominal pain which began approximately 2 weeks prior to admission. She reported pain is intermittent in frequency and described as a sharp/labored like pains throughout her entire abdomen, currently rated 0/10 in severity but worsens to 10/10 in severity with no known alleviating or aggravating factors noted.  Associated symptoms included nausea, vomiting, constipation, and decreased oral intake but denied endorsing any fever, chills, sweats, chest pain, shortness a breath, dysuria, hematuria, hematochezia, melena, or neurological deficits.  Patient reported having small bowel movement yesterday with intermittent bowel movements throughout the week reported loose/watery nature.  Patient reported last colonoscopy was within 10 years with [facility] and was told to come back 10 years later for repeat.  Patient follows Dr. Alcala at [facility] for OBGYN with last reported mg obtain last year and Pap smear few months ago prior to admission and reported both were normal.  Patient  reports history of skin cancer in her father but is unaware of any other known family history of cancers.  Prior to onset of symptoms, patient reported being in her usual state of health no other concerns or complaints.  All other review of systems negative except as noted above.  Patient admitted to Hospital Medicine for continued medical management of bowel obstruction and further cancer workup. General Surgery aware of patient and awaiting further evaluation in the morning.     PCP: Kerry Aguilar        Overview/Hospital Course:  Admitted for right lower quadrant abdominal mass causing bowel obstruction. POD5 S/p ex lap with ileocolonic diversion and biopsy of intra-abdominal mass. Tolerating clear liquid diet, denies abdominal pain, nausea, vomiting. Awaiting return of bowel function, denies bowel movements, passing flatus.      Interval History: patient reports  large BM this am. Continue to advance diet as tolerated. Staples removed today per general surgery. Encourage OOB.     Review of Systems   Gastrointestinal:  Negative for abdominal pain, nausea and vomiting.   Objective:     Vital Signs (Most Recent):  Temp: 97.6 °F (36.4 °C) (12/18/22 2029)  Pulse: 82 (12/18/22 2123)  Resp: 18 (12/18/22 2123)  BP: 115/66 (12/18/22 2029)  SpO2: (!) 92 % (12/18/22 2123)   Vital Signs (24h Range):  Temp:  [97.6 °F (36.4 °C)-99.4 °F (37.4 °C)] 97.6 °F (36.4 °C)  Pulse:  [64-93] 82  Resp:  [18-20] 18  SpO2:  [90 %-97 %] 92 %  BP: ()/(50-66) 115/66     Weight: 106.9 kg (235 lb 10.8 oz)  Body mass index is 39.22 kg/m².    Intake/Output Summary (Last 24 hours) at 12/18/2022 2159  Last data filed at 12/18/2022 1808  Gross per 24 hour   Intake 1430 ml   Output --   Net 1430 ml      Physical Exam  Vitals and nursing note reviewed.   Constitutional:       Appearance: Normal appearance. She is not ill-appearing.   Pulmonary:      Effort: Pulmonary effort is normal. No respiratory distress.   Neurological:      General: No  focal deficit present.      Mental Status: She is oriented to person, place, and time.   Psychiatric:         Mood and Affect: Mood normal.       Significant Labs: All pertinent labs within the past 24 hours have been reviewed.  CBC:   Recent Labs   Lab 12/18/22  0903   WBC 5.98   HGB 11.4*   HCT 36.5*        CMP:   Recent Labs   Lab 12/18/22  0903   *   K 4.0   CL 95   CO2 25   *   BUN 5*   CREATININE 0.6   CALCIUM 9.6   ANIONGAP 14       Significant Imaging: I have reviewed all pertinent imaging results/findings within the past 24 hours.      Assessment/Plan:      * Bowel obstruction  POD5 s/p ex lap with ileocolonic diversion and biopsy of intra-abdominal mass  F/u surgery recs  Clear liquids, advance as tolerated  Pain control  Awaiting bowel function to return. Laxatives prn    12/18   + BM this am per patient report  Advance diet as tolerated       Class 2 severe obesity due to excess calories with serious comorbidity and body mass index (BMI) of 36.0 to 36.9 in adult  Body mass index is 36.96 kg/m². Elevation likely secondary to increased calorie intake and sedentary lifestyle. Patient educated on morbidity and mortality in regards to elevated BMI and stressed importance of diet and exercise.   Plan:  -low fat/low calorie diet       Asthma  Patient with history of Asthma currently on inhalers but not home oxygen. Not presently in acute exacerbation and is without signs/symptoms of distress. Patient currently saturating  99% on 2 L/min. CXR revealed satisfactory nasogastric tube with clear lungs and normal appearance of pulmonary vasculature without evidence of pleural effusion or pneumothorax.   Plan:  -Continue home medications, titrate as needed  -Titrate oxygen requirements as needed  -Incentive spirometry   -Monitor pulse oximetry  -Kingsley prn          Type 2 diabetes mellitus, with long-term current use of insulin  Patient's FSGs are controlled on current medication regimen.  Last  A1c reviewed-   Lab Results   Component Value Date    HGBA1C 5.9 (H) 12/11/2022     Most recent fingerstick glucose reviewed-   Recent Labs   Lab 12/11/22  0556   POCTGLUCOSE 87     Current correctional scale  low  titrate anti-hyperglycemic dose as follows-   Antihyperglycemics (From admission, onward)    Start     Stop Route Frequency Ordered    12/11/22 0321  insulin aspart U-100 pen 1-10 Units         -- SubQ Every 6 hours PRN 12/11/22 0222      Plan:  -SSI  -Continue home insulin at decreased dose and titrate as needed  -Hold oral hypoglycemics while patient is in the hospital  -Hypoglycemic protocol     Gastroesophageal reflux disease without esophagitis  Chronic. Stable. Currently asymptomatic. Home medications include PPI/Antacids as needed.  Plan:  -Continue PPI/Antacids as needed         Primary hypertension  Currently normotensive.  Plan:  -Optimize pain control   -Continue home medications, titrate as needed   -Monitor BP  -Low salt/cardiac diet when not NPO  -IV hydralazine prn for SBP>160 or DBP>90       Continue current regimen and adjust accordingly        Generalized abdominal pain  Patient presented with worsening abdominal pain with associated nausea, vomiting, decreased p.o. intake, and constipation x2 weeks' duration and was found to have evidence of bowel obstruction associated with soft tissue mass.  CT abdomen/pelvis revealed an 11 cm soft tissue mass either arising from the cecum or distal small bowel consistent with malignancy causing a high-grade distal mechanical small bowel obstruction in addition to presence of peritoneal carcinomatosis.  Patient remains afebrile without leukocytosis and remains hemodynamically stable.  CMP, CBC, LFTs, and lipase within normal limits.  UA negative for UTI.  Patient status post NG tube insertion and currently on suction.  General surgery consulted and awaiting further evaluation/recommendations.  CEA and  ordered and pending prior to transfer.   Oncology consulted for malignant workup and to establish care.    Plan:  -Continue current pain regimen, titrate as needed  -advance diet per surgery            VTE Risk Mitigation (From admission, onward)         Ordered     enoxaparin injection 40 mg  Daily         12/10/22 2225     IP VTE HIGH RISK PATIENT  Once         12/10/22 2225     Place sequential compression device  Until discontinued         12/10/22 2225                      I have completed this tele-visit without the assistance of a telepresenter.    The attending portion of this evaluation, treatment, and documentation was performed per Nickie Casillas NP via Telemedicine AudioVisual using the secure [employer] software platform with 2 way audio/video. The provider was located off-site and the patient is located in the hospital. The aforementioned video software was utilized to document the relevant history and physical exam    Nickie Casillas NP  Department of Hospital Medicine   [facility] - Med Surg

## 2022-12-19 NOTE — PROGRESS NOTES
Pt sat up in chair for majority of shift, up to bathroom and to bed for short periods. Pt had large loose BM X1 this evening. Oxycodone and Morphine given for pain control with moderate relief rpted. Chart check completed and POC reviewed with pt.

## 2022-12-19 NOTE — PROGRESS NOTES
[facility] S[facility]  General Surgery  Progress Note    Subjective:     History of Present Illness:  58 y/o female referred for right lower quadrant abdominal mass causing bowel obstruction. Patient reports constipation and decreased appetite. Denies any weight loss. Recently began having abdominal pain and bloating. CT in ER showing intraabdominal mass in right lower quadrant causing bowel obstruction.  Only previous abdominal/pelvic surgery was a tubal ligation.      Post-Op Info:  Procedure(s) (LRB):  LAPAROTOMY, EXPLORATORY (N/A)  CYSTOSCOPY, WITH URETERAL STENT INSERTION (Right)  BLOCK, TRANSVERSUS ABDOMINIS PLANE (N/A)  PYELOGRAM, RETROGRADE (Right)   8 Days Post-Op     Interval History: improved incisional pain, having bowel movements overnight, tolerating liquids    Medications:  Continuous Infusions:  Scheduled Meds:   amLODIPine  5 mg Oral Daily    atorvastatin  20 mg Oral Daily    enoxaparin  40 mg Subcutaneous Daily    hydroCHLOROthiazide  12.5 mg Oral Daily    losartan  100 mg Oral Daily    pantoprazole  40 mg Oral Daily    polyethylene glycol  17 g Oral BID    senna-docusate 8.6-50 mg  1 tablet Oral BID    sulfamethoxazole-trimethoprim 800-160mg  1 tablet Oral BID     PRN Meds:acetaminophen, albuterol-ipratropium, aluminum-magnesium hydroxide-simethicone, dextrose 10%, dextrose 10%, diphenhydrAMINE, glucagon (human recombinant), glucagon (human recombinant), glucose, glucose, insulin aspart U-100, melatonin, morphine, naloxone, naloxone, ondansetron, oxyCODONE, oxyCODONE, promethazine, sodium chloride 0.9%     Review of patient's allergies indicates:  No Known Allergies  Objective:     Vital Signs (Most Recent):  Temp: 99.1 °F (37.3 °C) (12/19/22 0724)  Pulse: 88 (12/19/22 0724)  Resp: 20 (12/19/22 0724)  BP: (!) 91/54 (12/19/22 0724)  SpO2: 96 % (12/19/22 0724) Vital Signs (24h Range):  Temp:  [97.6 °F (36.4 °C)-99.4 °F (37.4 °C)] 99.1 °F (37.3 °C)  Pulse:  [69-93] 88  Resp:  [18-20] 20  SpO2:   [90 %-97 %] 96 %  BP: ()/(51-66) 91/54     Weight: 102.4 kg (225 lb 12 oz)  Body mass index is 37.57 kg/m².    Intake/Output - Last 3 Shifts         12/17 0700  12/18 0659 12/18 0700 12/19 0659 12/19 0700 12/20 0659    P.O.  1430     Total Intake(mL/kg)  1430 (14)     Net  +1430            Urine Occurrence  12 x     Stool Occurrence  1 x             Physical Exam  Constitutional:       Appearance: She is well-developed.   HENT:      Head: Normocephalic and atraumatic.   Eyes:      Conjunctiva/sclera: Conjunctivae normal.   Neck:      Thyroid: No thyromegaly.   Cardiovascular:      Rate and Rhythm: Normal rate and regular rhythm.   Pulmonary:      Effort: Pulmonary effort is normal. No respiratory distress.   Abdominal:      Comments: Soft, mild distention, incision clean and dry in upper portion with lower portion with mild improving erythema and serous drainage, couple staples removed with dressing change of wet to dry gauze   Musculoskeletal:         General: No tenderness. Normal range of motion.      Cervical back: Normal range of motion.   Skin:     General: Skin is warm and dry.      Capillary Refill: Capillary refill takes less than 2 seconds.      Findings: No rash.   Neurological:      Mental Status: She is alert and oriented to person, place, and time.       Significant Labs:  I have reviewed all pertinent lab results within the past 24 hours.  CBC:   Recent Labs   Lab 12/19/22  0606   WBC 5.88   RBC 3.87*   HGB 11.1*   HCT 34.7*      MCV 90   MCH 28.7   MCHC 32.0       BMP:   Recent Labs   Lab 12/19/22  0606   GLU 96      K 3.6   CL 98   CO2 26   BUN 4*   CREATININE 0.6   CALCIUM 9.5   MG 1.8       CMP:   Recent Labs   Lab 12/16/22  0918 12/18/22  0903 12/19/22  0606   *   < > 96   CALCIUM 9.5   < > 9.5   ALBUMIN 2.9*  --   --    PROT 7.0  --   --    *   < > 136   K 4.2   < > 3.6   CO2 26   < > 26   CL 96   < > 98   BUN 9   < > 4*   CREATININE 0.7   < > 0.6   ALKPHOS 57   --   --    ALT 13  --   --    AST 21  --   --    BILITOT 1.2*  --   --     < > = values in this interval not displayed.       LFTs:   Recent Labs   Lab 12/16/22  0918   ALT 13   AST 21   ALKPHOS 57   BILITOT 1.2*   PROT 7.0   ALBUMIN 2.9*         Significant Diagnostics:  I have reviewed all pertinent imaging results/findings within the past 24 hours.    Assessment/Plan:     * Bowel obstruction  S/p ex lap with ileocolonic diversion and biopsy of intra-abdominal mass    - regular diet  - daily dressing change wet to dry gauze  - Encouraged OOB, ambulation  - DVT and GI prophylaxis  - Incentive spirometry  - p.o. pain medication with IV breakthrough  -ok to discharge today if tolerating diet    Class 2 severe obesity due to excess calories with serious comorbidity and body mass index (BMI) of 36.0 to 36.9 in adult  Dietary modifications    Type 2 diabetes mellitus, with long-term current use of insulin  Medical mgmt    Primary hypertension  Medical mgmt        Gustabo Davis MD  General Surgery  [facility] - Med Surg

## 2022-12-20 ENCOUNTER — TELEPHONE (OUTPATIENT)
Dept: SURGERY | Facility: CLINIC | Age: 57
End: 2022-12-20
Payer: COMMERCIAL

## 2022-12-20 NOTE — TELEPHONE ENCOUNTER
Called and spoke with patient regarding appointment. Patient stated she needs an appointment for staple removal. Informed patient she is scheduled for 12/29/22 at 2:00pm with Oskar Bernardo at the [facility] location. Patient verbalized understanding.        ----- Message from Razia Negrete RN sent at 12/20/2022  3:21 PM CST -----  Regarding: post-op appt  Hi,  This patient needs an appt with Yaneth on 12/28 to have her staples removed.  I'm not sure if she needs another appt after that as a post-op, then that will need to be scheduled as well.  During surgery, biopsies were taken from a mass and she also wanted to know if she needs to schedule an appt with the oncologist or whether the surgeon will give her those results.  Can you please schedule the staple removal appt and assist her with her other question?  Phone number is [phone].

## 2022-12-21 ENCOUNTER — TELEPHONE (OUTPATIENT)
Dept: ADMINISTRATIVE | Facility: CLINIC | Age: 57
End: 2022-12-21
Payer: COMMERCIAL

## 2022-12-21 ENCOUNTER — TELEPHONE (OUTPATIENT)
Dept: SURGERY | Facility: CLINIC | Age: 57
End: 2022-12-21
Payer: COMMERCIAL

## 2022-12-21 ENCOUNTER — TELEPHONE (OUTPATIENT)
Dept: HEMATOLOGY/ONCOLOGY | Facility: CLINIC | Age: 57
End: 2022-12-21
Payer: COMMERCIAL

## 2022-12-21 ENCOUNTER — TELEPHONE (OUTPATIENT)
Dept: SURGERY | Facility: HOSPITAL | Age: 57
End: 2022-12-21
Payer: COMMERCIAL

## 2022-12-21 RX ORDER — OXYCODONE HYDROCHLORIDE 10 MG/1
10 TABLET ORAL EVERY 8 HOURS PRN
Qty: 15 TABLET | Refills: 0 | Status: SHIPPED | OUTPATIENT
Start: 2022-12-21 | End: 2022-12-29

## 2022-12-21 NOTE — TELEPHONE ENCOUNTER
----- Message from Lisa Bernabe PharmD sent at 12/21/2022 10:51 AM CST -----  Regarding: Pain medication request  Good morning. I'm with the post-discharge department.    Ms. Steen is requesting a prescription be sent to her preferred pharmacy on file[facility], for oxycodone. The patient was discharged from the hospital with a 5 days supply on 12/19. But she is still in pain and is taking the medication as prescribed. She is concerned that she will be out before her f/u appointment with you on 12/29. Ms. Steen is also requesting a call back, as she is not sure if she should still be experiencing pain. Please contact patient directly at [phone].    Thank you for your time.    Lisa    #Please note# This message was sent at the patient's request as a courtesy. Please do not reply to this message as this mailbox is not monitored. Thanks.

## 2022-12-21 NOTE — NURSING
1358pm: Outgoing call to pt regarding outpatient fup appt with Dr. Christianson to review biopsy results. Spoke with pt. Pt informed that bx results are back and Dr. Christianson would like to see pt outpatient to review. Pt has [facility] and [facility] outpatient [facility]'t [facility], therefore pt will have to pay self pay fee of $150 at visit for Dr. Christianson to review results. Pt will then be referred out to [facility] or [facility] for further Oncology care. Pt scheduled on 12/22 at 240 pm at [place] with Dr. Christianson. Pt given location instructions. Pt verbalized understanding. Pt plan to report to appt as scheduled. SW notified to assist pt in referral out process.

## 2022-12-21 NOTE — TELEPHONE ENCOUNTER
Called patient per her request. Patient was given a prescription for pain meds on 12/19/22. She wants a refill because she is afraid that she will run out. I advised that it may be too soon for the pharmacy to refill it. I will forward a message to Dr. Davis.

## 2022-12-21 NOTE — TELEPHONE ENCOUNTER
----- Message from Gustabo Davis MD sent at 12/21/2022  2:41 PM CST -----  Regarding: RE: Pain medication request  Refill sent to pharmacy.  Thank you  ----- Message -----  From: Alissa Lou MA  Sent: 12/21/2022  11:23 AM CST  To: Gustabo Davis MD  Subject: FW: Pain medication request                      Patient wants a refill Oxycodone 10mg because she is afraid that she will run out. I advised that it may be too soon for the pharmacy to refill it. Advised that I would send you a message for confirmation.    ----- Message -----  From: Lisa Bernabe PharmD  Sent: 12/21/2022  10:58 AM CST  To: [facility] Staff  Subject: Pain medication request                          Good morning. I'm with the post-discharge department.    Ms. Steen is requesting a prescription be sent to her preferred pharmacy on file, [facility], for oxycodone. The patient was discharged from the hospital with a 5 days supply on 12/19. But she is still in pain and is taking the medication as prescribed. She is concerned that she will be out before her f/u appointment with you on 12/29. Ms. Steen is also requesting a call back, as she is not sure if she should still be experiencing pain. Please contact patient directly at [phone].    Thank you for your time.    Lisa    #Please note# This message was sent at the patient's request as a courtesy. Please do not reply to this message as this mailbox is not monitored. Thanks.

## 2022-12-21 NOTE — TELEPHONE ENCOUNTER
----- Message from Alissa Lou MA sent at 12/21/2022 11:22 AM CST -----  Regarding: FW: Pain medication request  Patient wants a refill Oxycodone 10mg because she is afraid that she will run out. I advised that it may be too soon for the pharmacy to refill it. Advised that I would send you a message for confirmation.    ----- Message -----  From: Lisa Bernabe PharmD  Sent: 12/21/2022  10:58 AM CST  To: [facility] Staff  Subject: Pain medication request                          Good morning. I'm with the post-discharge department.    Ms. Steen is requesting a prescription be sent to her preferred pharmacy on file, [facility], for oxycodone. The patient was discharged from the hospital with a 5 days supply on 12/19. But she is still in pain and is taking the medication as prescribed. She is concerned that she will be out before her f/u appointment with you on 12/29. Ms. Steen is also requesting a call back, as she is not sure if she should still be experiencing pain. Please contact patient directly at [phone].    Thank you for your time.    Lisa    #Please note# This message was sent at the patient's request as a courtesy. Please do not reply to this message as this mailbox is not monitored. Thanks.        Pathology discussed with patient.     Comment Flow cytometry analysis of tissue was not submitted.   Immunohistochemical studies were performed paraffin block 1c  with adequate   positive and negative controls.  The large atypical lymphocytes are positive   for CD20,  BCL-6, high Ki-67 (>95%) and are negative for pancytokeratin,   CD23, BCL2, and  cyclin D1.  Reactive T cells are CD3 positive, CD5 positive,   and BCL-2 positive.  CD10 is noncontributory and will be repeated by [facility].  In Situ hybridization for EBV is negative.   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  FISH   analysis for C-MYC rearrangement and additional immunohistochemical  studies   for further subclassification are pending. A supplemental report will follow.     She is scheduled for appointment with oncology tomorrow and will follow-up with me in clinic as well

## 2022-12-22 ENCOUNTER — DOCUMENTATION ONLY (OUTPATIENT)
Dept: HEMATOLOGY/ONCOLOGY | Facility: CLINIC | Age: 57
End: 2022-12-22

## 2022-12-22 ENCOUNTER — OFFICE VISIT (OUTPATIENT)
Dept: HEMATOLOGY/ONCOLOGY | Facility: CLINIC | Age: 57
End: 2022-12-22
Payer: COMMERCIAL

## 2022-12-22 VITALS
RESPIRATION RATE: 18 BRPM | WEIGHT: 219.81 LBS | HEART RATE: 96 BPM | TEMPERATURE: 98 F | BODY MASS INDEX: 36.62 KG/M2 | DIASTOLIC BLOOD PRESSURE: 73 MMHG | SYSTOLIC BLOOD PRESSURE: 104 MMHG | OXYGEN SATURATION: 96 % | HEIGHT: 65 IN

## 2022-12-22 DIAGNOSIS — I10 PRIMARY HYPERTENSION: ICD-10-CM

## 2022-12-22 DIAGNOSIS — Z79.4 TYPE 2 DIABETES MELLITUS WITHOUT COMPLICATION, WITH LONG-TERM CURRENT USE OF INSULIN: ICD-10-CM

## 2022-12-22 DIAGNOSIS — E11.9 TYPE 2 DIABETES MELLITUS WITHOUT COMPLICATION, WITH LONG-TERM CURRENT USE OF INSULIN: ICD-10-CM

## 2022-12-22 DIAGNOSIS — C85.83 LARGE CELL LYMPHOMA OF INTRA-ABDOMINAL LYMPH NODES: Primary | ICD-10-CM

## 2022-12-22 DIAGNOSIS — J45.909 ASTHMA, UNSPECIFIED ASTHMA SEVERITY, UNSPECIFIED WHETHER COMPLICATED, UNSPECIFIED WHETHER PERSISTENT: ICD-10-CM

## 2022-12-22 DIAGNOSIS — E66.01 CLASS 2 SEVERE OBESITY DUE TO EXCESS CALORIES WITH SERIOUS COMORBIDITY AND BODY MASS INDEX (BMI) OF 36.0 TO 36.9 IN ADULT: ICD-10-CM

## 2022-12-22 PROCEDURE — 99999 PR PBB SHADOW E&M-EST. PATIENT-LVL III: CPT | Mod: PBBFAC,,, | Performed by: INTERNAL MEDICINE

## 2022-12-22 PROCEDURE — 3074F PR MOST RECENT SYSTOLIC BLOOD PRESSURE < 130 MM HG: ICD-10-PCS | Mod: CPTII,S$GLB,, | Performed by: INTERNAL MEDICINE

## 2022-12-22 PROCEDURE — 1111F DSCHRG MED/CURRENT MED MERGE: CPT | Mod: CPTII,S$GLB,, | Performed by: INTERNAL MEDICINE

## 2022-12-22 PROCEDURE — 99215 PR OFFICE/OUTPT VISIT, EST, LEVL V, 40-54 MIN: ICD-10-PCS | Mod: S$GLB,,, | Performed by: INTERNAL MEDICINE

## 2022-12-22 PROCEDURE — 1111F PR DISCHARGE MEDS RECONCILED W/ CURRENT OUTPATIENT MED LIST: ICD-10-PCS | Mod: CPTII,S$GLB,, | Performed by: INTERNAL MEDICINE

## 2022-12-22 PROCEDURE — 3078F DIAST BP <80 MM HG: CPT | Mod: CPTII,S$GLB,, | Performed by: INTERNAL MEDICINE

## 2022-12-22 PROCEDURE — 3074F SYST BP LT 130 MM HG: CPT | Mod: CPTII,S$GLB,, | Performed by: INTERNAL MEDICINE

## 2022-12-22 PROCEDURE — 99999 PR PBB SHADOW E&M-EST. PATIENT-LVL III: ICD-10-PCS | Mod: PBBFAC,,, | Performed by: INTERNAL MEDICINE

## 2022-12-22 PROCEDURE — 3008F PR BODY MASS INDEX (BMI) DOCUMENTED: ICD-10-PCS | Mod: CPTII,S$GLB,, | Performed by: INTERNAL MEDICINE

## 2022-12-22 PROCEDURE — 3044F PR MOST RECENT HEMOGLOBIN A1C LEVEL <7.0%: ICD-10-PCS | Mod: CPTII,S$GLB,, | Performed by: INTERNAL MEDICINE

## 2022-12-22 PROCEDURE — 1160F RVW MEDS BY RX/DR IN RCRD: CPT | Mod: CPTII,S$GLB,, | Performed by: INTERNAL MEDICINE

## 2022-12-22 PROCEDURE — 3078F PR MOST RECENT DIASTOLIC BLOOD PRESSURE < 80 MM HG: ICD-10-PCS | Mod: CPTII,S$GLB,, | Performed by: INTERNAL MEDICINE

## 2022-12-22 PROCEDURE — 1159F MED LIST DOCD IN RCRD: CPT | Mod: CPTII,S$GLB,, | Performed by: INTERNAL MEDICINE

## 2022-12-22 PROCEDURE — 3044F HG A1C LEVEL LT 7.0%: CPT | Mod: CPTII,S$GLB,, | Performed by: INTERNAL MEDICINE

## 2022-12-22 PROCEDURE — 3008F BODY MASS INDEX DOCD: CPT | Mod: CPTII,S$GLB,, | Performed by: INTERNAL MEDICINE

## 2022-12-22 PROCEDURE — 99215 OFFICE O/P EST HI 40 MIN: CPT | Mod: S$GLB,,, | Performed by: INTERNAL MEDICINE

## 2022-12-22 PROCEDURE — 1160F PR REVIEW ALL MEDS BY PRESCRIBER/CLIN PHARMACIST DOCUMENTED: ICD-10-PCS | Mod: CPTII,S$GLB,, | Performed by: INTERNAL MEDICINE

## 2022-12-22 PROCEDURE — 1159F PR MEDICATION LIST DOCUMENTED IN MEDICAL RECORD: ICD-10-PCS | Mod: CPTII,S$GLB,, | Performed by: INTERNAL MEDICINE

## 2022-12-22 NOTE — PROGRESS NOTES
JULIET met with patient and her family in clinic. JULIET printed a list of physicians in network with [facility]. MD consulted with Dr. Jarad Grewal. JULIET will fax referral to Dr. Grewal's office at [phone]. JULIET provided patient her business card if she had any questions in the future. [facility] does not accept [facility] which is why patient will be referred out. Pt verbalized understanding and thanked staff for all her help.

## 2022-12-23 ENCOUNTER — HOSPITAL ENCOUNTER (EMERGENCY)
Facility: HOSPITAL | Age: 57
Discharge: HOME | End: 2022-12-23
Attending: EMERGENCY MEDICINE
Payer: COMMERCIAL

## 2022-12-23 ENCOUNTER — TELEPHONE (OUTPATIENT)
Dept: ADMINISTRATIVE | Facility: CLINIC | Age: 57
End: 2022-12-23
Payer: COMMERCIAL

## 2022-12-23 VITALS
RESPIRATION RATE: 16 BRPM | OXYGEN SATURATION: 97 % | TEMPERATURE: 98 F | SYSTOLIC BLOOD PRESSURE: 133 MMHG | HEART RATE: 80 BPM | WEIGHT: 220 LBS | BODY MASS INDEX: 36.61 KG/M2 | DIASTOLIC BLOOD PRESSURE: 96 MMHG

## 2022-12-23 DIAGNOSIS — Z51.89 VISIT FOR WOUND CHECK: Primary | ICD-10-CM

## 2022-12-23 PROCEDURE — 99281 EMR DPT VST MAYX REQ PHY/QHP: CPT

## 2022-12-23 NOTE — ED PROVIDER NOTES
Encounter Date: 2022       History     Chief Complaint   Patient presents with    Wound Check     Recent surgery due to bowel obstruction, pt states she has packing in incision site and is unable to pull out the packing. Denies redness/swelling/pain to incision site.      Patient had abdominal surgery seen by  postop where they removed some of the lower staples in the wound dehisced.  They packed the wound and the patient is here today because she is unsure if the packing was supposed to come out.  Has no complaints of fever or worsening pain.      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    RA (rheumatoid arthritis)      Past Surgical History:   Procedure Laterality Date     SECTION      CYSTOSCOPY W/ URETERAL STENT PLACEMENT Right 2022    Procedure: CYSTOSCOPY, WITH URETERAL STENT INSERTION;  Surgeon: Brittany Gleason MD;  Location: [facility] OR;  Service: Urology;  Laterality: Right;    INJECTION OF ANESTHETIC AGENT INTO TISSUE PLANE DEFINED BY TRANSVERSUS ABDOMINIS MUSCLE N/A 2022    Procedure: BLOCK, TRANSVERSUS ABDOMINIS PLANE;  Surgeon: Gustabo Davis MD;  Location: [facility] OR;  Service: General;  Laterality: N/A;    LAPAROTOMY, EXPLORATORY N/A 2022    Procedure: LAPAROTOMY, EXPLORATORY;  Surgeon: Gustabo Davis MD;  Location: [facility] OR;  Service: General;  Laterality: N/A;  Ileocolonic bypass  Resection of omental nodule    RETROGRADE PYELOGRAPHY Right 2022    Procedure: PYELOGRAM, RETROGRADE;  Surgeon: Brittany Gleason MD;  Location: [facility] OR;  Service: Urology;  Laterality: Right;     Family History   Problem Relation Age of Onset    No Known Problems Mother     Cancer Father      Social History     Tobacco Use    Smoking status:  Never    Smokeless tobacco: Never   Substance Use Topics    Alcohol use: Not Currently    Drug use: Never     Review of Systems   Constitutional:  Negative for fever.   HENT:  Negative for sore throat.    Respiratory:  Negative for shortness of breath.    Cardiovascular:  Negative for chest pain.   Gastrointestinal:  Negative for nausea.   Genitourinary:  Negative for dysuria.   Musculoskeletal:  Negative for back pain.   Skin:  Negative for rash.   Neurological:  Negative for weakness.   Hematological:  Does not bruise/bleed easily.     Physical Exam     Initial Vitals [12/23/22 1122]   BP Pulse Resp Temp SpO2   (!) 133/96 80 16 98.2 °F (36.8 °C) 97 %      MAP       --         Physical Exam    Nursing note and vitals reviewed.  Constitutional: She appears well-developed and well-nourished. She is not diaphoretic. She is active.  Non-toxic appearance. No distress.   HENT:   Head: Normocephalic and atraumatic.   Eyes: Conjunctivae are normal. Right eye exhibits no discharge. Left eye exhibits no discharge. No scleral icterus.   Neck:   Normal range of motion.  Cardiovascular:  Normal rate, regular rhythm and intact distal pulses.           No murmur heard.  Pulmonary/Chest: Breath sounds normal. No respiratory distress. She has no wheezes.   Abdominal: She exhibits no distension.   Musculoskeletal:         General: No tenderness. Normal range of motion.      Cervical back: Normal range of motion.     Neurological: She is alert and oriented to person, place, and time. No cranial nerve deficit. GCS score is 15. GCS eye subscore is 4. GCS verbal subscore is 5. GCS motor subscore is 6.   Skin: Skin is warm and dry. Capillary refill takes less than 2 seconds. No rash noted.   Midline lower abdominal surgical incision the lower 1/3 is open with packing in the site there is no drainage with light palpation mild TTP.  No streaking redness or significant erythema or warmth around the site.  Upper portion is stapled and  appears normal as well.   Psychiatric: She has a normal mood and affect. Her behavior is normal. Judgment and thought content normal.       ED Course   Procedures  Labs Reviewed - No data to display       Imaging Results    None          Medications - No data to display  Medical Decision Making:   Patient states she has a follow-up appointment this week with Dr. Davis and really only came in because she was not sure if she was supposed to pull the packing out.  Advised her to leave the packing in and call and ask them online nurse Dr. Torres's office.                 I discussed with patient and/or family/caretaker that evaluation in the ED does not suggest any emergent or life threatening medical conditions requiring immediate intervention beyond what was provided in the ED, and I believe patient is safe for discharge.  Regardless, an unremarkable evaluation in the ED does not preclude the development or presence of a serious of life threatening condition. As such, patient was instructed to return immediately for any worsening or change in current symptoms.         Clinical Impression:   Final diagnoses:  [Z51.89] Visit for wound check (Primary)        ED Disposition Condition    Discharge Stable          ED Prescriptions    None       Follow-up Information       Follow up With Specialties Details Why Contact Info    Gustabo Davis MD General Surgery, Bariatrics   [address] [facility] [address]  4th Floor  [facility] [address]  [phone]               Dagoberto Viramontes NP  12/25/22 5521

## 2022-12-23 NOTE — PROGRESS NOTES
"Phoned patient in response to reply of "3" to post-discharge texting tracker. Ms. Steen wanted to be sure that her antibiotics weren't supposed to last until her hospfu appt on 12/29, as she is scheduled to take her last dose tonight. Per discharge notes, the antibiotic is for 5 days only. Patient verbalized understanding and thanked us for calling.        "

## 2022-12-28 ENCOUNTER — TELEPHONE (OUTPATIENT)
Dept: SURGERY | Facility: CLINIC | Age: 57
End: 2022-12-28
Payer: COMMERCIAL

## 2022-12-29 ENCOUNTER — OFFICE VISIT (OUTPATIENT)
Dept: SURGERY | Facility: CLINIC | Age: 57
End: 2022-12-29
Payer: COMMERCIAL

## 2022-12-29 VITALS
DIASTOLIC BLOOD PRESSURE: 75 MMHG | HEART RATE: 102 BPM | WEIGHT: 213.88 LBS | SYSTOLIC BLOOD PRESSURE: 114 MMHG | BODY MASS INDEX: 35.59 KG/M2

## 2022-12-29 DIAGNOSIS — C85.83 LARGE CELL LYMPHOMA OF INTRA-ABDOMINAL LYMPH NODES: Primary | ICD-10-CM

## 2022-12-29 PROCEDURE — 3008F PR BODY MASS INDEX (BMI) DOCUMENTED: ICD-10-PCS | Mod: CPTII,S$GLB,,

## 2022-12-29 PROCEDURE — 1160F RVW MEDS BY RX/DR IN RCRD: CPT | Mod: CPTII,S$GLB,,

## 2022-12-29 PROCEDURE — 99024 PR POST-OP FOLLOW-UP VISIT: ICD-10-PCS | Mod: S$GLB,,,

## 2022-12-29 PROCEDURE — 3078F DIAST BP <80 MM HG: CPT | Mod: CPTII,S$GLB,,

## 2022-12-29 PROCEDURE — 3074F SYST BP LT 130 MM HG: CPT | Mod: CPTII,S$GLB,,

## 2022-12-29 PROCEDURE — 99999 PR PBB SHADOW E&M-EST. PATIENT-LVL IV: CPT | Mod: PBBFAC,,,

## 2022-12-29 PROCEDURE — 3044F PR MOST RECENT HEMOGLOBIN A1C LEVEL <7.0%: ICD-10-PCS | Mod: CPTII,S$GLB,,

## 2022-12-29 PROCEDURE — 1159F PR MEDICATION LIST DOCUMENTED IN MEDICAL RECORD: ICD-10-PCS | Mod: CPTII,S$GLB,,

## 2022-12-29 PROCEDURE — 3078F PR MOST RECENT DIASTOLIC BLOOD PRESSURE < 80 MM HG: ICD-10-PCS | Mod: CPTII,S$GLB,,

## 2022-12-29 PROCEDURE — 1160F PR REVIEW ALL MEDS BY PRESCRIBER/CLIN PHARMACIST DOCUMENTED: ICD-10-PCS | Mod: CPTII,S$GLB,,

## 2022-12-29 PROCEDURE — 1159F MED LIST DOCD IN RCRD: CPT | Mod: CPTII,S$GLB,,

## 2022-12-29 PROCEDURE — 3044F HG A1C LEVEL LT 7.0%: CPT | Mod: CPTII,S$GLB,,

## 2022-12-29 PROCEDURE — 3074F PR MOST RECENT SYSTOLIC BLOOD PRESSURE < 130 MM HG: ICD-10-PCS | Mod: CPTII,S$GLB,,

## 2022-12-29 PROCEDURE — 3008F BODY MASS INDEX DOCD: CPT | Mod: CPTII,S$GLB,,

## 2022-12-29 PROCEDURE — 99024 POSTOP FOLLOW-UP VISIT: CPT | Mod: S$GLB,,,

## 2022-12-29 PROCEDURE — 99999 PR PBB SHADOW E&M-EST. PATIENT-LVL IV: ICD-10-PCS | Mod: PBBFAC,,,

## 2022-12-29 RX ORDER — HYDROCODONE BITARTRATE AND ACETAMINOPHEN 7.5; 325 MG/1; MG/1
1 TABLET ORAL EVERY 12 HOURS PRN
COMMUNITY
Start: 2022-12-22 | End: 2022-12-29 | Stop reason: SDUPTHER

## 2022-12-29 RX ORDER — HYDROCODONE BITARTRATE AND ACETAMINOPHEN 7.5; 325 MG/1; MG/1
1 TABLET ORAL EVERY 8 HOURS PRN
Qty: 20 TABLET | Refills: 0 | Status: SHIPPED | OUTPATIENT
Start: 2022-12-29

## 2022-12-29 NOTE — PROGRESS NOTES
History & Physical    SUBJECTIVE:     History of Present Illness:  Patient is a 57 y.o. female presentsf or post-operative evaluation s/p ex lap with ileo-colonic diversion due to a RLQ mass with final pathology of lymphoma. A few of the lower incisional staples were previously removed, and there is some dehiscence. She presents for wound check. She is following with oncology now at [facility] and is scheduled for port placement, bone marrow biopsy, and scans tomorrow and believes she should be starting chemo on Tuesday. She has expected quan-incisional pain. She is tolerating a regular diet and having normal bowel movements. She denies fevers, chills, nausea, and vomiting.     Chief Complaint   Patient presents with    Suture / Staple Removal     Staple removal       Review of patient's allergies indicates:  No Known Allergies    Current Outpatient Medications   Medication Sig Dispense Refill    metFORMIN (GLUCOPHAGE-XR) 500 MG ER 24hr tablet metformin  mg tablet,extended release 24 hr   TAKE 1 TABLET BY MOUTH EVERY DAY      albuterol (PROVENTIL/VENTOLIN HFA) 90 mcg/actuation inhaler Inhale 2 puffs into the lungs every 6 (six) hours as needed for Wheezing. 18 g 11    atorvastatin (LIPITOR) 20 MG tablet Take 20 mg by mouth.      budesonide-formoterol 160-4.5 mcg (SYMBICORT) 160-4.5 mcg/actuation HFAA Symbicort 160 mcg-4.5 mcg/actuation HFA aerosol inhaler      ergocalciferol (ERGOCALCIFEROL) 50,000 unit Cap ergocalciferol (vitamin D2) 1,250 mcg (50,000 unit) capsule   Take 1 capsule every week by oral route.      fluticasone propionate (FLONASE) 50 mcg/actuation nasal spray SMARTSI Spray(s) Both Nares Every Morning      HYDROcodone-acetaminophen (NORCO) 7.5-325 mg per tablet Take 1 tablet by mouth every 8 (eight) hours as needed for Pain. 20 tablet 0    loratadine (CLARITIN) 10 mg tablet loratadine 10 mg tablet   Take 1 tablet every day by oral route.      methotrexate (XATMEP ORAL) methotrexate      montelukast  (SINGULAIR) 10 mg tablet montelukast 10 mg tablet      omeprazole (PRILOSEC) 40 MG capsule omeprazole 40 mg capsule,delayed release      polyethylene glycol (GLYCOLAX) 17 gram/dose powder Take 17 g by mouth 2 (two) times daily. for 10 days (Patient not taking: Reported on 2022) 238 g 0    senna-docusate 8.6-50 mg (PERICOLACE) 8.6-50 mg per tablet Take 1 tablet by mouth 2 (two) times daily. for 10 days (Patient not taking: Reported on 2022) 20 tablet 0     No current facility-administered medications for this visit.       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    RA (rheumatoid arthritis)      Past Surgical History:   Procedure Laterality Date     SECTION      CYSTOSCOPY W/ URETERAL STENT PLACEMENT Right 2022    Procedure: CYSTOSCOPY, WITH URETERAL STENT INSERTION;  Surgeon: Brittany Gleason MD;  Location: [facility] OR;  Service: Urology;  Laterality: Right;    INJECTION OF ANESTHETIC AGENT INTO TISSUE PLANE DEFINED BY TRANSVERSUS ABDOMINIS MUSCLE N/A 2022    Procedure: BLOCK, TRANSVERSUS ABDOMINIS PLANE;  Surgeon: Gustabo Davis MD;  Location: [facility] OR;  Service: General;  Laterality: N/A;    LAPAROTOMY, EXPLORATORY N/A 2022    Procedure: LAPAROTOMY, EXPLORATORY;  Surgeon: Gustabo Davis MD;  Location: [facility] OR;  Service: General;  Laterality: N/A;  Ileocolonic bypass  Resection of omental nodule    RETROGRADE PYELOGRAPHY Right 2022    Procedure: PYELOGRAM, RETROGRADE;  Surgeon: Brittany Gleasno MD;  Location: [facility] OR;  Service: Urology;  Laterality: Right;     Family History   Problem Relation Age of Onset    No Known Problems Mother     Cancer Father      Social History     Tobacco Use    Smoking status: Never    Smokeless tobacco: Never   Substance Use Topics    Alcohol use: Not Currently     Drug use: Never        Review of Systems:  Review of Systems   Constitutional:  Negative for chills, fatigue, fever and unexpected weight change.   Respiratory:  Negative for cough, shortness of breath, wheezing and stridor.    Cardiovascular:  Negative for chest pain, palpitations and leg swelling.   Gastrointestinal:  Negative for abdominal distention, abdominal pain, constipation, diarrhea, nausea and vomiting.   Genitourinary:  Negative for difficulty urinating, dysuria, frequency, hematuria and urgency.   Skin:  Positive for wound. Negative for color change, pallor and rash.   Hematological:  Does not bruise/bleed easily.     OBJECTIVE:     Vital Signs (Most Recent)  Pulse: 102 (12/29/22 1323)  BP: 114/75 (12/29/22 1323)     97 kg (213 lb 13.5 oz)     Physical Exam:  Physical Exam  Vitals reviewed.   Constitutional:       General: She is not in acute distress.     Appearance: She is well-developed.   HENT:      Head: Normocephalic and atraumatic.      Right Ear: External ear normal.      Left Ear: External ear normal.   Eyes:      Extraocular Movements: Extraocular movements intact.      Conjunctiva/sclera: Conjunctivae normal.   Cardiovascular:      Rate and Rhythm: Normal rate.   Pulmonary:      Effort: Pulmonary effort is normal. No respiratory distress.   Abdominal:      Comments: Abdomen soft and non-distended with expected quan-incisional TTP. Lower portion of the wound with deep dehiscence but intact fascia with healthy, bleeding granulation tissue with some fat necrosis. Irrigated and re-packed. Remainder of staples removed.    Musculoskeletal:      Cervical back: Neck supple.   Skin:     General: Skin is warm and dry.   Neurological:      Mental Status: She is alert and oriented to person, place, and time.   Psychiatric:         Behavior: Behavior normal.         ASSESSMENT/PLAN:     56 y/o female s/p ex lap who is now being treated for lymphoma at [facility].    - educated on local wound care and gave  supplies  - Advised that optimally, chemo would not begin until wound was almost healed. Patient needs to inform her oncologist of the wound and have a discussion with that physician on risks versus benefits of delayed wound healing versus delayed chemo. Patient voiced understanding.  - RTC 1 week for wound check.      Yaneth Lopez PA-C  [facility]

## 2023-01-03 ENCOUNTER — DOCUMENTATION ONLY (OUTPATIENT)
Dept: HEMATOLOGY/ONCOLOGY | Facility: CLINIC | Age: 58
End: 2023-01-03
Payer: COMMERCIAL

## 2023-01-03 LAB
COMMENT: NORMAL
FINAL PATHOLOGIC DIAGNOSIS: NORMAL
Lab: NORMAL
MICROSCOPIC EXAM: NORMAL
SUPPLEMENTAL DIAGNOSIS: NORMAL

## 2023-01-04 ENCOUNTER — TELEPHONE (OUTPATIENT)
Dept: SURGERY | Facility: CLINIC | Age: 58
End: 2023-01-04
Payer: COMMERCIAL

## 2023-01-04 ENCOUNTER — TELEPHONE (OUTPATIENT)
Dept: ADMINISTRATIVE | Facility: CLINIC | Age: 58
End: 2023-01-04
Payer: COMMERCIAL

## 2023-01-04 NOTE — TELEPHONE ENCOUNTER
Spoke to patient who was requesting contact information for Dr. Wolfe. I provided patient with Dr. Wolfe contact information. No further assistance needed at this time.

## 2023-01-04 NOTE — TELEPHONE ENCOUNTER
Returned call to pharmacy, who was asking if it was okay to fill Norco 7.5 mgs at this time. Advised that it is okay, as it is for an acute pain episode related to dressing changes.    Yaneth Lopez PA-C  [facility]        ----- Message from Kandis Ku sent at 1/4/2023 12:13 PM CST -----  Type:  Pharmacy Calling to Clarify an RX    Name of Caller:Francie  Pharmacy Name:[facility]  Prescription Name:Hydrocodone  What do they need to clarify?:pt need to know if she can pay for them  Best Call Back Number:[phone]  Additional Information: na

## 2023-01-05 ENCOUNTER — OFFICE VISIT (OUTPATIENT)
Dept: SURGERY | Facility: CLINIC | Age: 58
End: 2023-01-05
Payer: COMMERCIAL

## 2023-01-05 VITALS
SYSTOLIC BLOOD PRESSURE: 110 MMHG | HEART RATE: 91 BPM | BODY MASS INDEX: 35.3 KG/M2 | TEMPERATURE: 98 F | WEIGHT: 211.88 LBS | HEIGHT: 65 IN | DIASTOLIC BLOOD PRESSURE: 74 MMHG

## 2023-01-05 DIAGNOSIS — C85.83 LARGE CELL LYMPHOMA OF INTRA-ABDOMINAL LYMPH NODES: Primary | ICD-10-CM

## 2023-01-05 PROCEDURE — 3074F PR MOST RECENT SYSTOLIC BLOOD PRESSURE < 130 MM HG: ICD-10-PCS | Mod: CPTII,S$GLB,,

## 2023-01-05 PROCEDURE — 1159F MED LIST DOCD IN RCRD: CPT | Mod: CPTII,S$GLB,,

## 2023-01-05 PROCEDURE — 99024 POSTOP FOLLOW-UP VISIT: CPT | Mod: S$GLB,,,

## 2023-01-05 PROCEDURE — 1160F PR REVIEW ALL MEDS BY PRESCRIBER/CLIN PHARMACIST DOCUMENTED: ICD-10-PCS | Mod: CPTII,S$GLB,,

## 2023-01-05 PROCEDURE — 99999 PR PBB SHADOW E&M-EST. PATIENT-LVL III: ICD-10-PCS | Mod: PBBFAC,,,

## 2023-01-05 PROCEDURE — 3074F SYST BP LT 130 MM HG: CPT | Mod: CPTII,S$GLB,,

## 2023-01-05 PROCEDURE — 99024 PR POST-OP FOLLOW-UP VISIT: ICD-10-PCS | Mod: S$GLB,,,

## 2023-01-05 PROCEDURE — 3008F BODY MASS INDEX DOCD: CPT | Mod: CPTII,S$GLB,,

## 2023-01-05 PROCEDURE — 1160F RVW MEDS BY RX/DR IN RCRD: CPT | Mod: CPTII,S$GLB,,

## 2023-01-05 PROCEDURE — 3008F PR BODY MASS INDEX (BMI) DOCUMENTED: ICD-10-PCS | Mod: CPTII,S$GLB,,

## 2023-01-05 PROCEDURE — 3078F DIAST BP <80 MM HG: CPT | Mod: CPTII,S$GLB,,

## 2023-01-05 PROCEDURE — 3078F PR MOST RECENT DIASTOLIC BLOOD PRESSURE < 80 MM HG: ICD-10-PCS | Mod: CPTII,S$GLB,,

## 2023-01-05 PROCEDURE — 99999 PR PBB SHADOW E&M-EST. PATIENT-LVL III: CPT | Mod: PBBFAC,,,

## 2023-01-05 PROCEDURE — 1159F PR MEDICATION LIST DOCUMENTED IN MEDICAL RECORD: ICD-10-PCS | Mod: CPTII,S$GLB,,

## 2023-01-05 NOTE — PROGRESS NOTES
History & Physical    SUBJECTIVE:     History of Present Illness:  Patient is a 57 y.o. female presentsf or post-operative evaluation s/p ex lap with ileo-colonic diversion due to a RLQ mass with final pathology of lymphoma. A few of the lower incisional staples were previously removed, and there is some dehiscence. She presents for wound check. She is following with oncology now at [facility] and is scheduled for port placement, bone marrow biopsy, and scans tomorrow and believes she should be starting chemo on Tuesday. She has expected quan-incisional pain. She is tolerating a regular diet and having normal bowel movements. She denies fevers, chills, nausea, and vomiting.     Interval History:  Since the last clinic visit, the patient underwent PET, bone marrow biopsy, and had port placed. They have been attempting local wound care but state that they have not been able to change the packing in the wound. She has quan-incisional pain but is otherwise doing well. She is tolerating a regular diet and having normal bowel movements. She denies fevers, chills, nausea, and vomiting.     Chief Complaint   Patient presents with    Wound Check       Review of patient's allergies indicates:  No Known Allergies    Current Outpatient Medications   Medication Sig Dispense Refill    albuterol (PROVENTIL/VENTOLIN HFA) 90 mcg/actuation inhaler Inhale 2 puffs into the lungs every 6 (six) hours as needed for Wheezing. 18 g 11    atorvastatin (LIPITOR) 20 MG tablet Take 20 mg by mouth.      budesonide-formoterol 160-4.5 mcg (SYMBICORT) 160-4.5 mcg/actuation HFAA Symbicort 160 mcg-4.5 mcg/actuation HFA aerosol inhaler      ergocalciferol (ERGOCALCIFEROL) 50,000 unit Cap ergocalciferol (vitamin D2) 1,250 mcg (50,000 unit) capsule   Take 1 capsule every week by oral route.      fluticasone propionate (FLONASE) 50 mcg/actuation nasal spray SMARTSI Spray(s) Both Nares Every Morning      HYDROcodone-acetaminophen (NORCO) 7.5-325 mg per tablet  Take 1 tablet by mouth every 8 (eight) hours as needed for Pain. 20 tablet 0    loratadine (CLARITIN) 10 mg tablet loratadine 10 mg tablet   Take 1 tablet every day by oral route.      metFORMIN (GLUCOPHAGE-XR) 500 MG ER 24hr tablet metformin  mg tablet,extended release 24 hr   TAKE 1 TABLET BY MOUTH EVERY DAY      methotrexate (XATMEP ORAL) methotrexate      montelukast (SINGULAIR) 10 mg tablet montelukast 10 mg tablet      omeprazole (PRILOSEC) 40 MG capsule omeprazole 40 mg capsule,delayed release       No current facility-administered medications for this visit.       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    RA (rheumatoid arthritis)      Past Surgical History:   Procedure Laterality Date     SECTION      CYSTOSCOPY W/ URETERAL STENT PLACEMENT Right 2022    Procedure: CYSTOSCOPY, WITH URETERAL STENT INSERTION;  Surgeon: Brittany Gleason MD;  Location: [facility] OR;  Service: Urology;  Laterality: Right;    INJECTION OF ANESTHETIC AGENT INTO TISSUE PLANE DEFINED BY TRANSVERSUS ABDOMINIS MUSCLE N/A 2022    Procedure: BLOCK, TRANSVERSUS ABDOMINIS PLANE;  Surgeon: Gustabo Davis MD;  Location: [facility] OR;  Service: General;  Laterality: N/A;    LAPAROTOMY, EXPLORATORY N/A 2022    Procedure: LAPAROTOMY, EXPLORATORY;  Surgeon: Gustabo Davis MD;  Location: [facility] OR;  Service: General;  Laterality: N/A;  Ileocolonic bypass  Resection of omental nodule    RETROGRADE PYELOGRAPHY Right 2022    Procedure: PYELOGRAM, RETROGRADE;  Surgeon: Brittany Gleason MD;  Location: [facility] OR;  Service: Urology;  Laterality: Right;     Family History   Problem Relation Age of Onset    No Known Problems Mother     Cancer Father      Social History     Tobacco Use    Smoking status: Never    Smokeless tobacco: Never   Substance  "Use Topics    Alcohol use: Not Currently    Drug use: Never        Review of Systems:  Review of Systems   Constitutional:  Negative for chills, fatigue, fever and unexpected weight change.   Respiratory:  Negative for cough, shortness of breath, wheezing and stridor.    Cardiovascular:  Negative for chest pain, palpitations and leg swelling.   Gastrointestinal:  Negative for abdominal distention, abdominal pain, constipation, diarrhea, nausea and vomiting.   Genitourinary:  Negative for difficulty urinating, dysuria, frequency, hematuria and urgency.   Skin:  Positive for wound. Negative for color change, pallor and rash.   Hematological:  Does not bruise/bleed easily.     OBJECTIVE:     Vital Signs (Most Recent)  Temp: 98.4 °F (36.9 °C) (01/05/23 1334)  Pulse: 91 (01/05/23 1334)  BP: 110/74 (01/05/23 1334)  5' 5" (1.651 m)  96.1 kg (211 lb 13.8 oz)     Physical Exam:  Physical Exam  Vitals reviewed.   Constitutional:       General: She is not in acute distress.     Appearance: She is well-developed.   HENT:      Head: Normocephalic and atraumatic.      Right Ear: External ear normal.      Left Ear: External ear normal.   Eyes:      Extraocular Movements: Extraocular movements intact.      Conjunctiva/sclera: Conjunctivae normal.   Cardiovascular:      Rate and Rhythm: Normal rate.   Pulmonary:      Effort: Pulmonary effort is normal. No respiratory distress.   Abdominal:      Comments: Abdomen soft and non-distended with expected quan-incisional TTP. Lower portion of the wound with healthy granulation tissue and some serous exudate but no SOI. Wound has decreased in size since the last visit. Irrigated well and re-packed.    Musculoskeletal:      Cervical back: Neck supple.   Skin:     General: Skin is warm and dry.   Neurological:      Mental Status: She is alert and oriented to person, place, and time.   Psychiatric:         Behavior: Behavior normal.         ASSESSMENT/PLAN:     58 y/o female s/p ex lap who is " now being treated for lymphoma at [facility].    - educated on local wound care and gave supplies.  - Emphasized the importance of DAILY changes of the ENTIRE dressing. Patient and family voiced understanding.   - Oncology wants to begin chemo on the 16th. Discussed with Dr. Davis, and this is reasonable given wound healing progression so far. Will attempt to call [facility] oncologist to confirm this.   - RTC 1 week for wound check.      Yaneth Lopez PA-C  [facility]

## 2023-01-06 DIAGNOSIS — C85.83 LARGE CELL LYMPHOMA OF INTRA-ABDOMINAL LYMPH NODES: Primary | ICD-10-CM

## 2023-01-11 ENCOUNTER — OFFICE VISIT (OUTPATIENT)
Dept: SURGERY | Facility: CLINIC | Age: 58
End: 2023-01-11
Payer: COMMERCIAL

## 2023-01-11 VITALS
WEIGHT: 212.94 LBS | DIASTOLIC BLOOD PRESSURE: 83 MMHG | SYSTOLIC BLOOD PRESSURE: 132 MMHG | BODY MASS INDEX: 35.44 KG/M2 | HEART RATE: 78 BPM | TEMPERATURE: 98 F

## 2023-01-11 DIAGNOSIS — C85.83 LARGE CELL LYMPHOMA OF INTRA-ABDOMINAL LYMPH NODES: Primary | ICD-10-CM

## 2023-01-11 PROCEDURE — 3079F DIAST BP 80-89 MM HG: CPT | Mod: CPTII,S$GLB,,

## 2023-01-11 PROCEDURE — 3079F PR MOST RECENT DIASTOLIC BLOOD PRESSURE 80-89 MM HG: ICD-10-PCS | Mod: CPTII,S$GLB,,

## 2023-01-11 PROCEDURE — 1159F PR MEDICATION LIST DOCUMENTED IN MEDICAL RECORD: ICD-10-PCS | Mod: CPTII,S$GLB,,

## 2023-01-11 PROCEDURE — 1160F RVW MEDS BY RX/DR IN RCRD: CPT | Mod: CPTII,S$GLB,,

## 2023-01-11 PROCEDURE — 99024 PR POST-OP FOLLOW-UP VISIT: ICD-10-PCS | Mod: S$GLB,,,

## 2023-01-11 PROCEDURE — 3075F SYST BP GE 130 - 139MM HG: CPT | Mod: CPTII,S$GLB,,

## 2023-01-11 PROCEDURE — 99999 PR PBB SHADOW E&M-EST. PATIENT-LVL III: ICD-10-PCS | Mod: PBBFAC,,,

## 2023-01-11 PROCEDURE — 99999 PR PBB SHADOW E&M-EST. PATIENT-LVL III: CPT | Mod: PBBFAC,,,

## 2023-01-11 PROCEDURE — 99024 POSTOP FOLLOW-UP VISIT: CPT | Mod: S$GLB,,,

## 2023-01-11 PROCEDURE — 1159F MED LIST DOCD IN RCRD: CPT | Mod: CPTII,S$GLB,,

## 2023-01-11 PROCEDURE — 1160F PR REVIEW ALL MEDS BY PRESCRIBER/CLIN PHARMACIST DOCUMENTED: ICD-10-PCS | Mod: CPTII,S$GLB,,

## 2023-01-11 PROCEDURE — 3075F PR MOST RECENT SYSTOLIC BLOOD PRESS GE 130-139MM HG: ICD-10-PCS | Mod: CPTII,S$GLB,,

## 2023-01-11 PROCEDURE — 3008F PR BODY MASS INDEX (BMI) DOCUMENTED: ICD-10-PCS | Mod: CPTII,S$GLB,,

## 2023-01-11 PROCEDURE — 3008F BODY MASS INDEX DOCD: CPT | Mod: CPTII,S$GLB,,

## 2023-01-11 NOTE — PHYSICIAN QUERY
PT Name: Becky Steen  MR #: [id number]    DOCUMENTATION CLARIFICATION     CDS: Brandy Capley, RN  Email: [email or web address]  This form is a permanent document in the medical record.     Query Date: January 11, 2023    By submitting this query, we are merely seeking further clarification of documentation.  Please utilize your independent clinical judgment when addressing the question(s) below.    The medical record contains the following:  Pathology Findings Location in Medical Record     1. LYMPH NODE, OMENTAL NODULE, EXCISIONAL BIOPSY:   AGGRESSIVE LARGE B-CELL LYMPHOMA, FINAL SUBCLASSIFICATION PENDING ADDITIONAL IMMUNOHISTOCHEMICAL STUDIES AND MYC REARRANGEMENT STUDY BY FISH. SEE COMMENT    Comments  Flow cytometry analysis of tissue was not submitted.     Immunohistochemical studies were performed paraffin block 1c with adequate positive and negative controls. The large atypical lymphocytes are positive for CD20, BCL-6, high Ki-67 (>95%) and are negative for pancytokeratin, CD23, BCL2, and cyclin D1. Reactive T cells are CD3 positive, CD5 positive, and BCL2 positive. CD10 is noncontributory and will be repeated by [facility]. In Situ hybridization for EBV is negative.     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 FISH analysis for C-MYC rearrangement and additional immunohistochemical studies for further subclassification are pending. A supplemental report will follow.    Supplemental Diagnosis  Final subclassification: Diffuse large B-cell lymphoma, germinal center origin       Surgical pathology 12/11       Please clarify the pathology findings.    [ X ] Pathology findings noted above are ruled in/confirmed as diagnoses   [  ] Pathology findings noted above are not confirmed as diagnoses   [  ] Other diagnosis (please specify): ___________   [  ] Clinically Undetermined     Please document in your progress notes daily for the duration of treatment until resolved and include in your  discharge summary.    Form No. [id number]

## 2023-01-11 NOTE — PROGRESS NOTES
History & Physical    SUBJECTIVE:     History of Present Illness:  Patient is a 57 y.o. female presentsf or post-operative evaluation s/p ex lap with ileo-colonic diversion due to a RLQ mass with final pathology of lymphoma. A few of the lower incisional staples were previously removed, and there is some dehiscence. She presents for wound check. She is following with oncology now at [facility] and is scheduled for port placement, bone marrow biopsy, and scans tomorrow and believes she should be starting chemo on Tuesday. She has expected quan-incisional pain. She is tolerating a regular diet and having normal bowel movements. She denies fevers, chills, nausea, and vomiting.     Interval History:  Since the last clinic visit, the patient has done well. She is planning to start chemo on Monday. She has some quan incisional pain but is otherwise doing well. She denies fevers, chills, nausea, and vomiting.      Chief Complaint   Patient presents with    Post-op Evaluation     Wound check        Review of patient's allergies indicates:  No Known Allergies    Current Outpatient Medications   Medication Sig Dispense Refill    atorvastatin (LIPITOR) 20 MG tablet Take 20 mg by mouth.      budesonide-formoterol 160-4.5 mcg (SYMBICORT) 160-4.5 mcg/actuation HFAA Symbicort 160 mcg-4.5 mcg/actuation HFA aerosol inhaler      ergocalciferol (ERGOCALCIFEROL) 50,000 unit Cap ergocalciferol (vitamin D2) 1,250 mcg (50,000 unit) capsule   Take 1 capsule every week by oral route.      fluticasone propionate (FLONASE) 50 mcg/actuation nasal spray SMARTSI Spray(s) Both Nares Every Morning      HYDROcodone-acetaminophen (NORCO) 7.5-325 mg per tablet Take 1 tablet by mouth every 8 (eight) hours as needed for Pain. 20 tablet 0    loratadine (CLARITIN) 10 mg tablet loratadine 10 mg tablet   Take 1 tablet every day by oral route.      metFORMIN (GLUCOPHAGE-XR) 500 MG ER 24hr tablet metformin  mg tablet,extended release 24 hr   TAKE 1 TABLET  BY MOUTH EVERY DAY      methotrexate (XATMEP ORAL) methotrexate      montelukast (SINGULAIR) 10 mg tablet montelukast 10 mg tablet      omeprazole (PRILOSEC) 40 MG capsule omeprazole 40 mg capsule,delayed release      albuterol (PROVENTIL/VENTOLIN HFA) 90 mcg/actuation inhaler Inhale 2 puffs into the lungs every 6 (six) hours as needed for Wheezing. 18 g 11     No current facility-administered medications for this visit.       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    RA (rheumatoid arthritis)      Past Surgical History:   Procedure Laterality Date     SECTION      CYSTOSCOPY W/ URETERAL STENT PLACEMENT Right 2022    Procedure: CYSTOSCOPY, WITH URETERAL STENT INSERTION;  Surgeon: Brittany Gleason MD;  Location: [facility] OR;  Service: Urology;  Laterality: Right;    INJECTION OF ANESTHETIC AGENT INTO TISSUE PLANE DEFINED BY TRANSVERSUS ABDOMINIS MUSCLE N/A 2022    Procedure: BLOCK, TRANSVERSUS ABDOMINIS PLANE;  Surgeon: Gustabo Davis MD;  Location: [facility] OR;  Service: General;  Laterality: N/A;    LAPAROTOMY, EXPLORATORY N/A 2022    Procedure: LAPAROTOMY, EXPLORATORY;  Surgeon: Gustabo Davis MD;  Location: [facility] OR;  Service: General;  Laterality: N/A;  Ileocolonic bypass  Resection of omental nodule    RETROGRADE PYELOGRAPHY Right 2022    Procedure: PYELOGRAM, RETROGRADE;  Surgeon: Brittany Gleason MD;  Location: [facility] OR;  Service: Urology;  Laterality: Right;     Family History   Problem Relation Age of Onset    No Known Problems Mother     Cancer Father      Social History     Tobacco Use    Smoking status: Never    Smokeless tobacco: Never   Substance Use Topics    Alcohol use: Not Currently    Drug use: Never        Review of Systems:  Review of Systems   Constitutional:  Negative for chills, fatigue,  fever and unexpected weight change.   Respiratory:  Negative for cough, shortness of breath, wheezing and stridor.    Cardiovascular:  Negative for chest pain, palpitations and leg swelling.   Gastrointestinal:  Positive for abdominal pain (Near wound). Negative for abdominal distention, constipation, diarrhea, nausea and vomiting.   Genitourinary:  Negative for difficulty urinating, dysuria, frequency, hematuria and urgency.   Skin:  Positive for wound. Negative for color change, pallor and rash.   Hematological:  Does not bruise/bleed easily.     OBJECTIVE:     Vital Signs (Most Recent)  Temp: 97.8 °F (36.6 °C) (01/11/23 0918)  Pulse: 78 (01/11/23 0918)  BP: 132/83 (01/11/23 0918)     96.6 kg (212 lb 15.4 oz)     Physical Exam:  Physical Exam  Vitals reviewed.   Constitutional:       General: She is not in acute distress.     Appearance: She is well-developed.   HENT:      Head: Normocephalic and atraumatic.      Right Ear: External ear normal.      Left Ear: External ear normal.   Eyes:      Extraocular Movements: Extraocular movements intact.      Conjunctiva/sclera: Conjunctivae normal.   Cardiovascular:      Rate and Rhythm: Normal rate.   Pulmonary:      Effort: Pulmonary effort is normal. No respiratory distress.   Abdominal:      Comments: Abdomen soft and non-distended with expected quan-incisional TTP. Lower portion of the incision that is dehisced with wound bed of healthy granulation tissue and serosanguinous drainage. Wound depth decreasing. Repacked and dressed.   Musculoskeletal:      Cervical back: Neck supple.   Skin:     General: Skin is warm and dry.   Neurological:      Mental Status: She is alert and oriented to person, place, and time.   Psychiatric:         Behavior: Behavior normal.         ASSESSMENT/PLAN:     58 y/o female s/p ex lap who is now being treated for lymphoma at [facility].    - Continue daily local wound care. Home health has been arranged to help with this as well.  - Okay to start  chemo on Monday.   - RTC 1-2 weeks for wound check.      Yaenth Lopez PA-C  [facility]

## 2023-01-18 ENCOUNTER — OFFICE VISIT (OUTPATIENT)
Dept: SURGERY | Facility: CLINIC | Age: 58
End: 2023-01-18
Payer: COMMERCIAL

## 2023-01-18 VITALS
HEART RATE: 88 BPM | WEIGHT: 211.19 LBS | SYSTOLIC BLOOD PRESSURE: 137 MMHG | DIASTOLIC BLOOD PRESSURE: 79 MMHG | BODY MASS INDEX: 35.15 KG/M2 | TEMPERATURE: 98 F

## 2023-01-18 DIAGNOSIS — C85.83 LARGE CELL LYMPHOMA OF INTRA-ABDOMINAL LYMPH NODES: Primary | ICD-10-CM

## 2023-01-18 PROCEDURE — 3078F PR MOST RECENT DIASTOLIC BLOOD PRESSURE < 80 MM HG: ICD-10-PCS | Mod: CPTII,S$GLB,,

## 2023-01-18 PROCEDURE — 3008F BODY MASS INDEX DOCD: CPT | Mod: CPTII,S$GLB,,

## 2023-01-18 PROCEDURE — 99024 PR POST-OP FOLLOW-UP VISIT: ICD-10-PCS | Mod: S$GLB,,,

## 2023-01-18 PROCEDURE — 99024 POSTOP FOLLOW-UP VISIT: CPT | Mod: S$GLB,,,

## 2023-01-18 PROCEDURE — 1159F MED LIST DOCD IN RCRD: CPT | Mod: CPTII,S$GLB,,

## 2023-01-18 PROCEDURE — 3075F SYST BP GE 130 - 139MM HG: CPT | Mod: CPTII,S$GLB,,

## 2023-01-18 PROCEDURE — 99999 PR PBB SHADOW E&M-EST. PATIENT-LVL III: CPT | Mod: PBBFAC,,,

## 2023-01-18 PROCEDURE — 1159F PR MEDICATION LIST DOCUMENTED IN MEDICAL RECORD: ICD-10-PCS | Mod: CPTII,S$GLB,,

## 2023-01-18 PROCEDURE — 3078F DIAST BP <80 MM HG: CPT | Mod: CPTII,S$GLB,,

## 2023-01-18 PROCEDURE — 99999 PR PBB SHADOW E&M-EST. PATIENT-LVL III: ICD-10-PCS | Mod: PBBFAC,,,

## 2023-01-18 PROCEDURE — 3075F PR MOST RECENT SYSTOLIC BLOOD PRESS GE 130-139MM HG: ICD-10-PCS | Mod: CPTII,S$GLB,,

## 2023-01-18 PROCEDURE — 3008F PR BODY MASS INDEX (BMI) DOCUMENTED: ICD-10-PCS | Mod: CPTII,S$GLB,,

## 2023-01-18 NOTE — PROGRESS NOTES
History & Physical    SUBJECTIVE:     History of Present Illness:  Patient is a 57 y.o. female presentsf or post-operative evaluation s/p ex lap with ileo-colonic diversion due to a RLQ mass with final pathology of lymphoma. A few of the lower incisional staples were previously removed, and there is some dehiscence. She presents for wound check. She is following with oncology now at [facility] and is scheduled for port placement, bone marrow biopsy, and scans tomorrow and believes she should be starting chemo on Tuesday. She has expected quan-incisional pain. She is tolerating a regular diet and having normal bowel movements. She denies fevers, chills, nausea, and vomiting.     Interval History:  Since the last clinic visit, the patient has done well. She underwent chemo this week and is actually feeling well with minimal side effects. She has continued daily packing of her abdominal wound. She denies fevers, chills, nausea, and vomiting.    Chief Complaint   Patient presents with    Post-op Evaluation     Wound check        Review of patient's allergies indicates:  No Known Allergies    Current Outpatient Medications   Medication Sig Dispense Refill    atorvastatin (LIPITOR) 20 MG tablet Take 20 mg by mouth.      budesonide-formoterol 160-4.5 mcg (SYMBICORT) 160-4.5 mcg/actuation HFAA Symbicort 160 mcg-4.5 mcg/actuation HFA aerosol inhaler      ergocalciferol (ERGOCALCIFEROL) 50,000 unit Cap ergocalciferol (vitamin D2) 1,250 mcg (50,000 unit) capsule   Take 1 capsule every week by oral route.      fluticasone propionate (FLONASE) 50 mcg/actuation nasal spray SMARTSI Spray(s) Both Nares Every Morning      HYDROcodone-acetaminophen (NORCO) 7.5-325 mg per tablet Take 1 tablet by mouth every 8 (eight) hours as needed for Pain. 20 tablet 0    loratadine (CLARITIN) 10 mg tablet loratadine 10 mg tablet   Take 1 tablet every day by oral route.      metFORMIN (GLUCOPHAGE-XR) 500 MG ER 24hr tablet metformin  mg  tablet,extended release 24 hr   TAKE 1 TABLET BY MOUTH EVERY DAY      methotrexate (XATMEP ORAL) methotrexate      montelukast (SINGULAIR) 10 mg tablet montelukast 10 mg tablet      omeprazole (PRILOSEC) 40 MG capsule omeprazole 40 mg capsule,delayed release      albuterol (PROVENTIL/VENTOLIN HFA) 90 mcg/actuation inhaler Inhale 2 puffs into the lungs every 6 (six) hours as needed for Wheezing. 18 g 11     No current facility-administered medications for this visit.       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    RA (rheumatoid arthritis)      Past Surgical History:   Procedure Laterality Date     SECTION      CYSTOSCOPY W/ URETERAL STENT PLACEMENT Right 2022    Procedure: CYSTOSCOPY, WITH URETERAL STENT INSERTION;  Surgeon: Brittany Gleason MD;  Location: [facility] OR;  Service: Urology;  Laterality: Right;    INJECTION OF ANESTHETIC AGENT INTO TISSUE PLANE DEFINED BY TRANSVERSUS ABDOMINIS MUSCLE N/A 2022    Procedure: BLOCK, TRANSVERSUS ABDOMINIS PLANE;  Surgeon: Gustabo Davis MD;  Location: [facility] OR;  Service: General;  Laterality: N/A;    LAPAROTOMY, EXPLORATORY N/A 2022    Procedure: LAPAROTOMY, EXPLORATORY;  Surgeon: Gustabo Davis MD;  Location: [facility] OR;  Service: General;  Laterality: N/A;  Ileocolonic bypass  Resection of omental nodule    RETROGRADE PYELOGRAPHY Right 2022    Procedure: PYELOGRAM, RETROGRADE;  Surgeon: Brittany Gleason MD;  Location: [facility] OR;  Service: Urology;  Laterality: Right;     Family History   Problem Relation Age of Onset    No Known Problems Mother     Cancer Father      Social History     Tobacco Use    Smoking status: Never    Smokeless tobacco: Never   Substance Use Topics    Alcohol use: Not Currently    Drug use: Never        Review of Systems:  Review of Systems    Constitutional:  Negative for chills, fatigue, fever and unexpected weight change.   Respiratory:  Negative for cough, shortness of breath, wheezing and stridor.    Cardiovascular:  Negative for chest pain, palpitations and leg swelling.   Gastrointestinal:  Positive for abdominal pain (Near wound). Negative for abdominal distention, constipation, diarrhea, nausea and vomiting.   Genitourinary:  Negative for difficulty urinating, dysuria, frequency, hematuria and urgency.   Skin:  Positive for wound. Negative for color change, pallor and rash.   Hematological:  Does not bruise/bleed easily.     OBJECTIVE:     Vital Signs (Most Recent)  Temp: 98.2 °F (36.8 °C) (01/18/23 1004)  Pulse: 88 (01/18/23 1004)  BP: 137/79 (01/18/23 1004)     95.8 kg (211 lb 3.2 oz)     Physical Exam:  Physical Exam  Vitals reviewed.   Constitutional:       General: She is not in acute distress.     Appearance: She is well-developed.   HENT:      Head: Normocephalic and atraumatic.      Right Ear: External ear normal.      Left Ear: External ear normal.   Eyes:      Extraocular Movements: Extraocular movements intact.      Conjunctiva/sclera: Conjunctivae normal.   Cardiovascular:      Rate and Rhythm: Normal rate.   Pulmonary:      Effort: Pulmonary effort is normal. No respiratory distress.   Abdominal:      Comments: Abdomen soft and non-distended with expected quan-incisional TTP. Lower portion of the incision that is dehisced with wound bed of healthy granulation tissue and serosanguinous drainage. Wound depth and overall size continually decreasing. Repacked and dressed.   Musculoskeletal:      Cervical back: Neck supple.   Skin:     General: Skin is warm and dry.   Neurological:      Mental Status: She is alert and oriented to person, place, and time.   Psychiatric:         Behavior: Behavior normal.         ASSESSMENT/PLAN:     58 y/o female s/p ex lap who is now being treated for lymphoma at [facility].    - Continue daily local wound  care/packing.    - RTC 3 weeks for wound check.      Yaneth Lopez PA-C  [facility]

## 2023-01-25 ENCOUNTER — TELEPHONE (OUTPATIENT)
Dept: SURGERY | Facility: CLINIC | Age: 58
End: 2023-01-25
Payer: COMMERCIAL

## 2023-01-25 ENCOUNTER — OFFICE VISIT (OUTPATIENT)
Dept: SURGERY | Facility: CLINIC | Age: 58
End: 2023-01-25
Payer: COMMERCIAL

## 2023-01-25 VITALS
BODY MASS INDEX: 34.41 KG/M2 | SYSTOLIC BLOOD PRESSURE: 138 MMHG | DIASTOLIC BLOOD PRESSURE: 94 MMHG | HEART RATE: 106 BPM | WEIGHT: 206.81 LBS

## 2023-01-25 DIAGNOSIS — C85.83 LARGE CELL LYMPHOMA OF INTRA-ABDOMINAL LYMPH NODES: Primary | ICD-10-CM

## 2023-01-25 PROCEDURE — 3080F PR MOST RECENT DIASTOLIC BLOOD PRESSURE >= 90 MM HG: ICD-10-PCS | Mod: CPTII,S$GLB,,

## 2023-01-25 PROCEDURE — 3080F DIAST BP >= 90 MM HG: CPT | Mod: CPTII,S$GLB,,

## 2023-01-25 PROCEDURE — 3075F PR MOST RECENT SYSTOLIC BLOOD PRESS GE 130-139MM HG: ICD-10-PCS | Mod: CPTII,S$GLB,,

## 2023-01-25 PROCEDURE — 99999 PR PBB SHADOW E&M-EST. PATIENT-LVL III: ICD-10-PCS | Mod: PBBFAC,,,

## 2023-01-25 PROCEDURE — 1160F RVW MEDS BY RX/DR IN RCRD: CPT | Mod: CPTII,S$GLB,,

## 2023-01-25 PROCEDURE — 99024 POSTOP FOLLOW-UP VISIT: CPT | Mod: S$GLB,,,

## 2023-01-25 PROCEDURE — 3075F SYST BP GE 130 - 139MM HG: CPT | Mod: CPTII,S$GLB,,

## 2023-01-25 PROCEDURE — 1159F MED LIST DOCD IN RCRD: CPT | Mod: CPTII,S$GLB,,

## 2023-01-25 PROCEDURE — 99999 PR PBB SHADOW E&M-EST. PATIENT-LVL III: CPT | Mod: PBBFAC,,,

## 2023-01-25 PROCEDURE — 3008F PR BODY MASS INDEX (BMI) DOCUMENTED: ICD-10-PCS | Mod: CPTII,S$GLB,,

## 2023-01-25 PROCEDURE — 3008F BODY MASS INDEX DOCD: CPT | Mod: CPTII,S$GLB,,

## 2023-01-25 PROCEDURE — 1160F PR REVIEW ALL MEDS BY PRESCRIBER/CLIN PHARMACIST DOCUMENTED: ICD-10-PCS | Mod: CPTII,S$GLB,,

## 2023-01-25 PROCEDURE — 99024 PR POST-OP FOLLOW-UP VISIT: ICD-10-PCS | Mod: S$GLB,,,

## 2023-01-25 PROCEDURE — 1159F PR MEDICATION LIST DOCUMENTED IN MEDICAL RECORD: ICD-10-PCS | Mod: CPTII,S$GLB,,

## 2023-01-25 NOTE — TELEPHONE ENCOUNTER
Called pt back. She is not having fever but there is drainage and smell. Scheduled her to see [facility] today. She is coming as quickly as she can

## 2023-01-25 NOTE — TELEPHONE ENCOUNTER
----- Message from Dorene Fields sent at 1/25/2023  9:38 AM CST -----  Contact: patient  Becky Steen  would like a call back at [phone], in regards to her surgery. Pt states there is a smell coming from her incision, and she would like to know if she needs to go the ER.

## 2023-01-25 NOTE — PROGRESS NOTES
History & Physical    SUBJECTIVE:     History of Present Illness:  Patient is a 57 y.o. female presentsf or post-operative evaluation s/p ex lap with ileo-colonic diversion due to a RLQ mass with final pathology of lymphoma. A few of the lower incisional staples were previously removed, and there is some dehiscence. She presents for wound check. She is following with oncology now at [facility] and is scheduled for port placement, bone marrow biopsy, and scans tomorrow and believes she should be starting chemo on Tuesday. She has expected quan-incisional pain. She is tolerating a regular diet and having normal bowel movements. She denies fevers, chills, nausea, and vomiting.     Interval History:  The patient was alarmed today when performing wound care that the site had a foul odor and the dressing had more discharge than expected. Of note, the dressing had not been changed for 2 days. She denies any increased pain, redness, or swelling in the area. She denies fevers, chills, nausea, and vomiting. She is having some diarrhea.     Chief Complaint   Patient presents with    Follow-up     Wound check        Review of patient's allergies indicates:  No Known Allergies    Current Outpatient Medications   Medication Sig Dispense Refill    atorvastatin (LIPITOR) 20 MG tablet Take 20 mg by mouth.      budesonide-formoterol 160-4.5 mcg (SYMBICORT) 160-4.5 mcg/actuation HFAA Symbicort 160 mcg-4.5 mcg/actuation HFA aerosol inhaler      ergocalciferol (ERGOCALCIFEROL) 50,000 unit Cap ergocalciferol (vitamin D2) 1,250 mcg (50,000 unit) capsule   Take 1 capsule every week by oral route.      fluticasone propionate (FLONASE) 50 mcg/actuation nasal spray SMARTSI Spray(s) Both Nares Every Morning      HYDROcodone-acetaminophen (NORCO) 7.5-325 mg per tablet Take 1 tablet by mouth every 8 (eight) hours as needed for Pain. 20 tablet 0    loratadine (CLARITIN) 10 mg tablet loratadine 10 mg tablet   Take 1 tablet every day by oral route.       metFORMIN (GLUCOPHAGE-XR) 500 MG ER 24hr tablet metformin  mg tablet,extended release 24 hr   TAKE 1 TABLET BY MOUTH EVERY DAY      methotrexate (XATMEP ORAL) methotrexate      montelukast (SINGULAIR) 10 mg tablet montelukast 10 mg tablet      omeprazole (PRILOSEC) 40 MG capsule omeprazole 40 mg capsule,delayed release      albuterol (PROVENTIL/VENTOLIN HFA) 90 mcg/actuation inhaler Inhale 2 puffs into the lungs every 6 (six) hours as needed for Wheezing. 18 g 11     No current facility-administered medications for this visit.       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    RA (rheumatoid arthritis)      Past Surgical History:   Procedure Laterality Date     SECTION      CYSTOSCOPY W/ URETERAL STENT PLACEMENT Right 2022    Procedure: CYSTOSCOPY, WITH URETERAL STENT INSERTION;  Surgeon: Brittany Gleason MD;  Location: [facility] OR;  Service: Urology;  Laterality: Right;    INJECTION OF ANESTHETIC AGENT INTO TISSUE PLANE DEFINED BY TRANSVERSUS ABDOMINIS MUSCLE N/A 2022    Procedure: BLOCK, TRANSVERSUS ABDOMINIS PLANE;  Surgeon: Gustabo Davis MD;  Location: [facility] OR;  Service: General;  Laterality: N/A;    LAPAROTOMY, EXPLORATORY N/A 2022    Procedure: LAPAROTOMY, EXPLORATORY;  Surgeon: Gustabo Davis MD;  Location: [facility] OR;  Service: General;  Laterality: N/A;  Ileocolonic bypass  Resection of omental nodule    RETROGRADE PYELOGRAPHY Right 2022    Procedure: PYELOGRAM, RETROGRADE;  Surgeon: Brittany Gleason MD;  Location: [facility] OR;  Service: Urology;  Laterality: Right;     Family History   Problem Relation Age of Onset    No Known Problems Mother     Cancer Father      Social History     Tobacco Use    Smoking status: Never    Smokeless tobacco: Never   Substance Use Topics    Alcohol use: Not  Currently    Drug use: Never        Review of Systems:  Review of Systems   Constitutional:  Negative for chills, fatigue, fever and unexpected weight change.   Respiratory:  Negative for cough, shortness of breath, wheezing and stridor.    Cardiovascular:  Negative for chest pain, palpitations and leg swelling.   Gastrointestinal:  Positive for abdominal pain (Near wound) and diarrhea. Negative for abdominal distention, constipation, nausea and vomiting.   Genitourinary:  Negative for difficulty urinating, dysuria, frequency, hematuria and urgency.   Skin:  Positive for wound. Negative for color change, pallor and rash.   Hematological:  Does not bruise/bleed easily.     OBJECTIVE:     Vital Signs (Most Recent)  Pulse: 106 (01/25/23 1119)  BP: (!) 138/94 (01/25/23 1119)     93.8 kg (206 lb 12.7 oz)     Physical Exam:  Physical Exam  Vitals reviewed.   Constitutional:       General: She is not in acute distress.     Appearance: She is well-developed.   HENT:      Head: Normocephalic and atraumatic.      Right Ear: External ear normal.      Left Ear: External ear normal.   Eyes:      Extraocular Movements: Extraocular movements intact.      Conjunctiva/sclera: Conjunctivae normal.   Cardiovascular:      Rate and Rhythm: Normal rate.   Pulmonary:      Effort: Pulmonary effort is normal. No respiratory distress.   Abdominal:      Comments: Abdomen soft and non-distended with expected quan-incisional TTP. Lower portion of the incision that is dehisced with wound bed of healthy granulation tissue and serosanguinous drainage. Wound depth and overall size continually decreasing. Repacked and dressed. No signs of infection.    Musculoskeletal:      Cervical back: Neck supple.   Skin:     General: Skin is warm and dry.   Neurological:      Mental Status: She is alert and oriented to person, place, and time.   Psychiatric:         Behavior: Behavior normal.         ASSESSMENT/PLAN:     58 y/o female s/p ex lap who is now  being treated for lymphoma at [facility].    - Continue daily local wound care/packing.    - Emphasized the importance of daily dressing changes and packing the wound as deeply as possible.   - Educated on signs and symptoms of infection and gave precautions. Patient voiced understanding.  - Advised that diarrhea may be from chemo versus post-surgical. Can try Benefiber and imodium as needed if okay with oncologist.   - RTC as previously scheduled.       Yaneth Lopez PA-C  [facility]

## 2023-02-07 ENCOUNTER — TELEPHONE (OUTPATIENT)
Dept: SURGERY | Facility: CLINIC | Age: 58
End: 2023-02-07
Payer: COMMERCIAL

## 2023-02-07 ENCOUNTER — TELEPHONE (OUTPATIENT)
Dept: HEMATOLOGY/ONCOLOGY | Facility: CLINIC | Age: 58
End: 2023-02-07
Payer: COMMERCIAL

## 2023-02-07 NOTE — TELEPHONE ENCOUNTER
----- Message from Melissa Hebert LCSW sent at 2/7/2023  2:39 PM CST -----  Hey patient has questions about tomorrow's appt. She wants to see if she can push it back some due to another appt at 2pm. She can be reached on number in chart.

## 2023-02-07 NOTE — TELEPHONE ENCOUNTER
JULIET received call from patient on accident. Pt was attempting to contact surgery to ask about tomorrow's appt. TIFFANYR reports she will reach out to staff to contact her. JULIET called and left message for nurse but will send an inbasket as well.

## 2023-02-08 ENCOUNTER — OFFICE VISIT (OUTPATIENT)
Dept: SURGERY | Facility: CLINIC | Age: 58
End: 2023-02-08
Payer: COMMERCIAL

## 2023-02-08 VITALS
SYSTOLIC BLOOD PRESSURE: 147 MMHG | DIASTOLIC BLOOD PRESSURE: 84 MMHG | WEIGHT: 208.75 LBS | HEART RATE: 81 BPM | BODY MASS INDEX: 34.74 KG/M2

## 2023-02-08 DIAGNOSIS — C85.83 LARGE CELL LYMPHOMA OF INTRA-ABDOMINAL LYMPH NODES: Primary | ICD-10-CM

## 2023-02-08 PROCEDURE — 3077F PR MOST RECENT SYSTOLIC BLOOD PRESSURE >= 140 MM HG: ICD-10-PCS | Mod: CPTII,S$GLB,,

## 2023-02-08 PROCEDURE — 1160F RVW MEDS BY RX/DR IN RCRD: CPT | Mod: CPTII,S$GLB,,

## 2023-02-08 PROCEDURE — 1159F PR MEDICATION LIST DOCUMENTED IN MEDICAL RECORD: ICD-10-PCS | Mod: CPTII,S$GLB,,

## 2023-02-08 PROCEDURE — 99024 POSTOP FOLLOW-UP VISIT: CPT | Mod: S$GLB,,,

## 2023-02-08 PROCEDURE — 3077F SYST BP >= 140 MM HG: CPT | Mod: CPTII,S$GLB,,

## 2023-02-08 PROCEDURE — 3079F DIAST BP 80-89 MM HG: CPT | Mod: CPTII,S$GLB,,

## 2023-02-08 PROCEDURE — 1160F PR REVIEW ALL MEDS BY PRESCRIBER/CLIN PHARMACIST DOCUMENTED: ICD-10-PCS | Mod: CPTII,S$GLB,,

## 2023-02-08 PROCEDURE — 99024 PR POST-OP FOLLOW-UP VISIT: ICD-10-PCS | Mod: S$GLB,,,

## 2023-02-08 PROCEDURE — 3008F PR BODY MASS INDEX (BMI) DOCUMENTED: ICD-10-PCS | Mod: CPTII,S$GLB,,

## 2023-02-08 PROCEDURE — 3008F BODY MASS INDEX DOCD: CPT | Mod: CPTII,S$GLB,,

## 2023-02-08 PROCEDURE — 1159F MED LIST DOCD IN RCRD: CPT | Mod: CPTII,S$GLB,,

## 2023-02-08 PROCEDURE — 99999 PR PBB SHADOW E&M-EST. PATIENT-LVL III: CPT | Mod: PBBFAC,,,

## 2023-02-08 PROCEDURE — 3079F PR MOST RECENT DIASTOLIC BLOOD PRESSURE 80-89 MM HG: ICD-10-PCS | Mod: CPTII,S$GLB,,

## 2023-02-08 PROCEDURE — 99999 PR PBB SHADOW E&M-EST. PATIENT-LVL III: ICD-10-PCS | Mod: PBBFAC,,,

## 2023-02-08 NOTE — PROGRESS NOTES
History & Physical    SUBJECTIVE:     History of Present Illness:  Patient is a 58 y.o. female presentsf or post-operative evaluation s/p ex lap with ileo-colonic diversion due to a RLQ mass with final pathology of lymphoma. A few of the lower incisional staples were previously removed, and there is some dehiscence. She presents for wound check. She is following with oncology now at [facility] and is scheduled for port placement, bone marrow biopsy, and scans tomorrow and believes she should be starting chemo on Tuesday. She has expected quan-incisional pain. She is tolerating a regular diet and having normal bowel movements. She denies fevers, chills, nausea, and vomiting.     Interval History:  Since the last clinic visit, the patient has undergone another round of chemo. She is feeling well today and has continued to perform local wound care. She denies much pain or drainage from the area. She denies fevers, chills, nausea, and vomiting.      Chief Complaint   Patient presents with    Follow-up     Wound check        Review of patient's allergies indicates:  No Known Allergies    Current Outpatient Medications   Medication Sig Dispense Refill    atorvastatin (LIPITOR) 20 MG tablet Take 20 mg by mouth.      budesonide-formoterol 160-4.5 mcg (SYMBICORT) 160-4.5 mcg/actuation HFAA Symbicort 160 mcg-4.5 mcg/actuation HFA aerosol inhaler      ergocalciferol (ERGOCALCIFEROL) 50,000 unit Cap ergocalciferol (vitamin D2) 1,250 mcg (50,000 unit) capsule   Take 1 capsule every week by oral route.      fluticasone propionate (FLONASE) 50 mcg/actuation nasal spray SMARTSI Spray(s) Both Nares Every Morning      HYDROcodone-acetaminophen (NORCO) 7.5-325 mg per tablet Take 1 tablet by mouth every 8 (eight) hours as needed for Pain. 20 tablet 0    loratadine (CLARITIN) 10 mg tablet loratadine 10 mg tablet   Take 1 tablet every day by oral route.      metFORMIN (GLUCOPHAGE-XR) 500 MG ER 24hr tablet metformin  mg tablet,extended  release 24 hr   TAKE 1 TABLET BY MOUTH EVERY DAY      methotrexate (XATMEP ORAL) methotrexate      montelukast (SINGULAIR) 10 mg tablet montelukast 10 mg tablet      omeprazole (PRILOSEC) 40 MG capsule omeprazole 40 mg capsule,delayed release      albuterol (PROVENTIL/VENTOLIN HFA) 90 mcg/actuation inhaler Inhale 2 puffs into the lungs every 6 (six) hours as needed for Wheezing. 18 g 11     No current facility-administered medications for this visit.       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    RA (rheumatoid arthritis)      Past Surgical History:   Procedure Laterality Date     SECTION      CYSTOSCOPY W/ URETERAL STENT PLACEMENT Right 2022    Procedure: CYSTOSCOPY, WITH URETERAL STENT INSERTION;  Surgeon: Brittany Gleason MD;  Location: [facility] OR;  Service: Urology;  Laterality: Right;    INJECTION OF ANESTHETIC AGENT INTO TISSUE PLANE DEFINED BY TRANSVERSUS ABDOMINIS MUSCLE N/A 2022    Procedure: BLOCK, TRANSVERSUS ABDOMINIS PLANE;  Surgeon: Gustabo Davis MD;  Location: [facility] OR;  Service: General;  Laterality: N/A;    LAPAROTOMY, EXPLORATORY N/A 2022    Procedure: LAPAROTOMY, EXPLORATORY;  Surgeon: Gustabo Davis MD;  Location: [facility] OR;  Service: General;  Laterality: N/A;  Ileocolonic bypass  Resection of omental nodule    RETROGRADE PYELOGRAPHY Right 2022    Procedure: PYELOGRAM, RETROGRADE;  Surgeon: Brittany Gleason MD;  Location: [facility] OR;  Service: Urology;  Laterality: Right;     Family History   Problem Relation Age of Onset    No Known Problems Mother     Cancer Father      Social History     Tobacco Use    Smoking status: Never    Smokeless tobacco: Never   Substance Use Topics    Alcohol use: Not Currently    Drug use: Never        Review of Systems:  Review of Systems   Constitutional:   Negative for chills, fatigue, fever and unexpected weight change.   Respiratory:  Negative for cough, shortness of breath, wheezing and stridor.    Cardiovascular:  Negative for chest pain, palpitations and leg swelling.   Gastrointestinal:  Positive for abdominal pain (Near wound). Negative for abdominal distention, constipation, nausea and vomiting.   Genitourinary:  Negative for difficulty urinating, dysuria, frequency, hematuria and urgency.   Skin:  Positive for wound. Negative for color change, pallor and rash.   Hematological:  Does not bruise/bleed easily.     OBJECTIVE:     Vital Signs (Most Recent)  Pulse: 81 (02/08/23 1324)  BP: (!) 147/84 (02/08/23 1324)     94.7 kg (208 lb 12.4 oz)     Physical Exam:  Physical Exam  Vitals reviewed.   Constitutional:       General: She is not in acute distress.     Appearance: She is well-developed.   HENT:      Head: Normocephalic and atraumatic.      Right Ear: External ear normal.      Left Ear: External ear normal.   Eyes:      Extraocular Movements: Extraocular movements intact.      Conjunctiva/sclera: Conjunctivae normal.   Cardiovascular:      Rate and Rhythm: Normal rate.   Pulmonary:      Effort: Pulmonary effort is normal. No respiratory distress.   Abdominal:      Comments: Abdomen soft and non-distended with expected quan-incisional TTP. Lower portion of the incision that is dehisced with wound bed of healthy granulation tissue and serosanguinous drainage. Wound depth and overall size continually decreasing. Repacked and dressed. No signs of infection.   Irritation due to tape surrounding wound. No SOI. Avoid adhesive   Musculoskeletal:      Cervical back: Neck supple.   Skin:     General: Skin is warm and dry.   Neurological:      Mental Status: She is alert and oriented to person, place, and time.   Psychiatric:         Behavior: Behavior normal.         ASSESSMENT/PLAN:     56 y/o female s/p ex lap who is now being treated for lymphoma at [facility].    -  Continue daily local wound care/packing.    - Emphasized the importance of daily dressing changes and packing the wound as deeply as possible.   - Educated on signs and symptoms of infection and gave precautions. Patient voiced understanding.  - RTC 3-4 weeks for wound check.       Yaneth Lopez PA-C  [facility]

## 2023-03-06 ENCOUNTER — LAB VISIT (OUTPATIENT)
Dept: LAB | Facility: HOSPITAL | Age: 58
End: 2023-03-06
Attending: INTERNAL MEDICINE
Payer: COMMERCIAL

## 2023-03-06 DIAGNOSIS — C83.39 LYMPHOID GRANULOMATOSIS OF THE LUNG: ICD-10-CM

## 2023-03-06 LAB
BASOPHILS # BLD AUTO: ABNORMAL K/UL (ref 0–0.2)
BASOPHILS NFR BLD: 0 % (ref 0–1.9)
DIFFERENTIAL METHOD: ABNORMAL
EOSINOPHIL # BLD AUTO: ABNORMAL K/UL (ref 0–0.5)
EOSINOPHIL NFR BLD: 0 % (ref 0–8)
ERYTHROCYTE [DISTWIDTH] IN BLOOD BY AUTOMATED COUNT: 19.5 % (ref 11.5–14.5)
HCT VFR BLD AUTO: 28.9 % (ref 37–48.5)
HGB BLD-MCNC: 9.2 G/DL (ref 12–16)
IMM GRANULOCYTES # BLD AUTO: ABNORMAL K/UL (ref 0–0.04)
IMM GRANULOCYTES NFR BLD AUTO: ABNORMAL % (ref 0–0.5)
LYMPHOCYTES # BLD AUTO: ABNORMAL K/UL (ref 1–4.8)
LYMPHOCYTES NFR BLD: 14 % (ref 18–48)
MCH RBC QN AUTO: 28.4 PG (ref 27–31)
MCHC RBC AUTO-ENTMCNC: 31.8 G/DL (ref 32–36)
MCV RBC AUTO: 89 FL (ref 82–98)
METAMYELOCYTES NFR BLD MANUAL: 4 %
MONOCYTES # BLD AUTO: ABNORMAL K/UL (ref 0.3–1)
MONOCYTES NFR BLD: 15 % (ref 4–15)
MYELOCYTES NFR BLD MANUAL: 9 %
NEUTROPHILS NFR BLD: 55 % (ref 38–73)
NEUTS BAND NFR BLD MANUAL: 2 %
NRBC BLD-RTO: 1 /100 WBC
PLATELET # BLD AUTO: 354 K/UL (ref 150–450)
PMV BLD AUTO: 10.8 FL (ref 9.2–12.9)
PROMYELOCYTES NFR BLD MANUAL: 1 %
RBC # BLD AUTO: 3.24 M/UL (ref 4–5.4)
WBC # BLD AUTO: 26.91 K/UL (ref 3.9–12.7)

## 2023-03-06 PROCEDURE — 85027 COMPLETE CBC AUTOMATED: CPT | Mod: PO | Performed by: INTERNAL MEDICINE

## 2023-03-06 PROCEDURE — 85007 BL SMEAR W/DIFF WBC COUNT: CPT | Mod: PO | Performed by: INTERNAL MEDICINE

## 2023-03-08 ENCOUNTER — OFFICE VISIT (OUTPATIENT)
Dept: SURGERY | Facility: CLINIC | Age: 58
End: 2023-03-08
Payer: COMMERCIAL

## 2023-03-08 VITALS
BODY MASS INDEX: 33.68 KG/M2 | HEART RATE: 109 BPM | SYSTOLIC BLOOD PRESSURE: 122 MMHG | WEIGHT: 202.38 LBS | DIASTOLIC BLOOD PRESSURE: 87 MMHG

## 2023-03-08 DIAGNOSIS — C85.83 LARGE CELL LYMPHOMA OF INTRA-ABDOMINAL LYMPH NODES: Primary | ICD-10-CM

## 2023-03-08 PROCEDURE — 3008F BODY MASS INDEX DOCD: CPT | Mod: CPTII,S$GLB,,

## 2023-03-08 PROCEDURE — 1160F PR REVIEW ALL MEDS BY PRESCRIBER/CLIN PHARMACIST DOCUMENTED: ICD-10-PCS | Mod: CPTII,S$GLB,,

## 2023-03-08 PROCEDURE — 3008F PR BODY MASS INDEX (BMI) DOCUMENTED: ICD-10-PCS | Mod: CPTII,S$GLB,,

## 2023-03-08 PROCEDURE — 99213 OFFICE O/P EST LOW 20 MIN: CPT | Mod: PBBFAC

## 2023-03-08 PROCEDURE — 99024 POSTOP FOLLOW-UP VISIT: CPT | Mod: S$GLB,,,

## 2023-03-08 PROCEDURE — 99999 PR PBB SHADOW E&M-EST. PATIENT-LVL III: ICD-10-PCS | Mod: PBBFAC,,,

## 2023-03-08 PROCEDURE — 99999 PR PBB SHADOW E&M-EST. PATIENT-LVL III: CPT | Mod: PBBFAC,,,

## 2023-03-08 PROCEDURE — 1160F RVW MEDS BY RX/DR IN RCRD: CPT | Mod: CPTII,S$GLB,,

## 2023-03-08 PROCEDURE — 99024 PR POST-OP FOLLOW-UP VISIT: ICD-10-PCS | Mod: S$GLB,,,

## 2023-03-08 PROCEDURE — 1159F MED LIST DOCD IN RCRD: CPT | Mod: CPTII,S$GLB,,

## 2023-03-08 PROCEDURE — 1159F PR MEDICATION LIST DOCUMENTED IN MEDICAL RECORD: ICD-10-PCS | Mod: CPTII,S$GLB,,

## 2023-03-08 PROCEDURE — 3074F PR MOST RECENT SYSTOLIC BLOOD PRESSURE < 130 MM HG: ICD-10-PCS | Mod: CPTII,S$GLB,,

## 2023-03-08 PROCEDURE — 3074F SYST BP LT 130 MM HG: CPT | Mod: CPTII,S$GLB,,

## 2023-03-08 PROCEDURE — 3079F DIAST BP 80-89 MM HG: CPT | Mod: CPTII,S$GLB,,

## 2023-03-08 PROCEDURE — 3079F PR MOST RECENT DIASTOLIC BLOOD PRESSURE 80-89 MM HG: ICD-10-PCS | Mod: CPTII,S$GLB,,

## 2023-03-08 RX ORDER — APIXABAN 5 MG (74)
KIT ORAL
COMMUNITY
Start: 2023-02-19

## 2023-03-08 NOTE — PROGRESS NOTES
History & Physical    SUBJECTIVE:     History of Present Illness:  Patient is a 58 y.o. female presentsf or post-operative evaluation s/p ex lap with ileo-colonic diversion due to a RLQ mass with final pathology of lymphoma. A few of the lower incisional staples were previously removed, and there is some dehiscence. She presents for wound check. She is following with oncology now at [facility] and is scheduled for port placement, bone marrow biopsy, and scans tomorrow and believes she should be starting chemo on Tuesday. She has expected quan-incisional pain. She is tolerating a regular diet and having normal bowel movements. She denies fevers, chills, nausea, and vomiting.     Interval History:  Since the last clinic visit, the patient was hospitalized and had to have her right chest MediPort removed due to a clot.  She is taking Eliquis at this time.  She had a left arm what appears to be Ledesma catheter placed.  Her abdominal wound is nearly resolved.  She denies any pain in the area.  She is still having consistent bowel movements, especially diarrhea around the time of chemo.  She denies fevers, chills, nausea, and vomiting.    Chief Complaint   Patient presents with    Post-op Evaluation     4 week f/u       Review of patient's allergies indicates:  No Known Allergies    Current Outpatient Medications   Medication Sig Dispense Refill    atorvastatin (LIPITOR) 20 MG tablet Take 20 mg by mouth.      budesonide-formoterol 160-4.5 mcg (SYMBICORT) 160-4.5 mcg/actuation HFAA Symbicort 160 mcg-4.5 mcg/actuation HFA aerosol inhaler      ELIQUIS DVT-PE TREAT 30D START 5 mg (74 tabs) DsPk TAKE 2 TABLETS BY MOUTH TWICE A DAY FOR 7 DAYS, THEN 1 TABLET BY MOUTH TWICE DAILY      ergocalciferol (ERGOCALCIFEROL) 50,000 unit Cap ergocalciferol (vitamin D2) 1,250 mcg (50,000 unit) capsule   Take 1 capsule every week by oral route.      fluticasone propionate (FLONASE) 50 mcg/actuation nasal spray SMARTSI Spray(s) Both Nares Every  Morning      HYDROcodone-acetaminophen (NORCO) 7.5-325 mg per tablet Take 1 tablet by mouth every 8 (eight) hours as needed for Pain. 20 tablet 0    loratadine (CLARITIN) 10 mg tablet loratadine 10 mg tablet   Take 1 tablet every day by oral route.      metFORMIN (GLUCOPHAGE-XR) 500 MG ER 24hr tablet metformin  mg tablet,extended release 24 hr   TAKE 1 TABLET BY MOUTH EVERY DAY      methotrexate (XATMEP ORAL) methotrexate      montelukast (SINGULAIR) 10 mg tablet montelukast 10 mg tablet      omeprazole (PRILOSEC) 40 MG capsule omeprazole 40 mg capsule,delayed release      albuterol (PROVENTIL/VENTOLIN HFA) 90 mcg/actuation inhaler Inhale 2 puffs into the lungs every 6 (six) hours as needed for Wheezing. 18 g 11     No current facility-administered medications for this visit.       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    RA (rheumatoid arthritis)      Past Surgical History:   Procedure Laterality Date     SECTION      CYSTOSCOPY W/ URETERAL STENT PLACEMENT Right 2022    Procedure: CYSTOSCOPY, WITH URETERAL STENT INSERTION;  Surgeon: Brittany Gleason MD;  Location: [facility] OR;  Service: Urology;  Laterality: Right;    INJECTION OF ANESTHETIC AGENT INTO TISSUE PLANE DEFINED BY TRANSVERSUS ABDOMINIS MUSCLE N/A 2022    Procedure: BLOCK, TRANSVERSUS ABDOMINIS PLANE;  Surgeon: Gustabo Davis MD;  Location: [facility] OR;  Service: General;  Laterality: N/A;    LAPAROTOMY, EXPLORATORY N/A 2022    Procedure: LAPAROTOMY, EXPLORATORY;  Surgeon: Gustabo Davis MD;  Location: [facility] OR;  Service: General;  Laterality: N/A;  Ileocolonic bypass  Resection of omental nodule    RETROGRADE PYELOGRAPHY Right 2022    Procedure: PYELOGRAM, RETROGRADE;  Surgeon: Brittany Gleason MD;  Location: [facility] OR;  Service: Urology;   Laterality: Right;     Family History   Problem Relation Age of Onset    No Known Problems Mother     Cancer Father      Social History     Tobacco Use    Smoking status: Never    Smokeless tobacco: Never   Substance Use Topics    Alcohol use: Not Currently    Drug use: Never        Review of Systems:  Review of Systems   Constitutional:  Negative for chills, fatigue, fever and unexpected weight change.   Respiratory:  Negative for cough, shortness of breath, wheezing and stridor.    Cardiovascular:  Negative for chest pain, palpitations and leg swelling.   Gastrointestinal:  Positive for diarrhea. Negative for abdominal distention, abdominal pain, constipation, nausea and vomiting.   Genitourinary:  Negative for difficulty urinating, dysuria, frequency, hematuria and urgency.   Skin:  Positive for wound (Minimal). Negative for color change, pallor and rash.   Hematological:  Does not bruise/bleed easily.     OBJECTIVE:     Vital Signs (Most Recent)  Pulse: 109 (03/08/23 1008)  BP: 122/87 (03/08/23 1008)     91.8 kg (202 lb 6.1 oz)     Physical Exam:  Physical Exam  Vitals reviewed.   Constitutional:       General: She is not in acute distress.     Appearance: She is well-developed.   HENT:      Head: Normocephalic and atraumatic.      Right Ear: External ear normal.      Left Ear: External ear normal.   Eyes:      Extraocular Movements: Extraocular movements intact.      Conjunctiva/sclera: Conjunctivae normal.   Cardiovascular:      Rate and Rhythm: Normal rate.   Pulmonary:      Effort: Pulmonary effort is normal. No respiratory distress.   Chest:      Comments: Right chest MediPort removal site with Steri-Strips in place, clean and dry without signs of infection.  Abdominal:      Comments: Abdomen soft, nontender, and nondistended.  Area of previous dehiscence now very superficial with healthy granulation tissue.  Nearly resolved.   Musculoskeletal:      Cervical back: Neck supple.   Skin:     General: Skin is  warm and dry.   Neurological:      Mental Status: She is alert and oriented to person, place, and time.   Psychiatric:         Behavior: Behavior normal.         ASSESSMENT/PLAN:     56 y/o female s/p ex lap who is now being treated for lymphoma at [facility].    - local wound care as needed.  Once the resolve within the next 1-2 weeks.  - RTC as needed or if any issues arise      Yaneth Lopez PA-C  [facility]

## 2023-03-21 ENCOUNTER — LAB VISIT (OUTPATIENT)
Dept: LAB | Facility: HOSPITAL | Age: 58
End: 2023-03-21
Attending: INTERNAL MEDICINE
Payer: COMMERCIAL

## 2023-03-21 DIAGNOSIS — I80.8 PHLEBITIS AND THROMBOPHLEBITIS OF SUPERFICIAL VEINS OF UPPER EXTREMITIES: ICD-10-CM

## 2023-03-21 LAB
BASOPHILS # BLD AUTO: 0.05 K/UL (ref 0–0.2)
BASOPHILS NFR BLD: 0.8 % (ref 0–1.9)
DIFFERENTIAL METHOD: ABNORMAL
EOSINOPHIL # BLD AUTO: 0 K/UL (ref 0–0.5)
EOSINOPHIL NFR BLD: 0.5 % (ref 0–8)
ERYTHROCYTE [DISTWIDTH] IN BLOOD BY AUTOMATED COUNT: 19.7 % (ref 11.5–14.5)
HCT VFR BLD AUTO: 26.2 % (ref 37–48.5)
HGB BLD-MCNC: 8.3 G/DL (ref 12–16)
IMM GRANULOCYTES # BLD AUTO: 0.1 K/UL (ref 0–0.04)
IMM GRANULOCYTES NFR BLD AUTO: 1.5 % (ref 0–0.5)
LYMPHOCYTES # BLD AUTO: 0.4 K/UL (ref 1–4.8)
LYMPHOCYTES NFR BLD: 6.6 % (ref 18–48)
MCH RBC QN AUTO: 28.5 PG (ref 27–31)
MCHC RBC AUTO-ENTMCNC: 31.7 G/DL (ref 32–36)
MCV RBC AUTO: 90 FL (ref 82–98)
MONOCYTES # BLD AUTO: 0.2 K/UL (ref 0.3–1)
MONOCYTES NFR BLD: 3.2 % (ref 4–15)
NEUTROPHILS # BLD AUTO: 5.7 K/UL (ref 1.8–7.7)
NEUTROPHILS NFR BLD: 87.4 % (ref 38–73)
NRBC BLD-RTO: 0 /100 WBC
PLATELET # BLD AUTO: 285 K/UL (ref 150–450)
PMV BLD AUTO: 10.7 FL (ref 9.2–12.9)
RBC # BLD AUTO: 2.91 M/UL (ref 4–5.4)
WBC # BLD AUTO: 6.49 K/UL (ref 3.9–12.7)

## 2023-03-21 PROCEDURE — 85025 COMPLETE CBC W/AUTO DIFF WBC: CPT | Mod: PO | Performed by: INTERNAL MEDICINE

## 2023-03-29 ENCOUNTER — LAB VISIT (OUTPATIENT)
Dept: LAB | Facility: HOSPITAL | Age: 58
End: 2023-03-29
Attending: NURSE PRACTITIONER
Payer: COMMERCIAL

## 2023-03-29 DIAGNOSIS — I80.8 PHLEBITIS AND THROMBOPHLEBITIS OF SUPERFICIAL VEINS OF UPPER EXTREMITIES: ICD-10-CM

## 2023-03-29 LAB
BASOPHILS # BLD AUTO: 0.06 K/UL (ref 0–0.2)
BASOPHILS NFR BLD: 1.1 % (ref 0–1.9)
DIFFERENTIAL METHOD: ABNORMAL
EOSINOPHIL # BLD AUTO: 0.1 K/UL (ref 0–0.5)
EOSINOPHIL NFR BLD: 1.1 % (ref 0–8)
ERYTHROCYTE [DISTWIDTH] IN BLOOD BY AUTOMATED COUNT: 19.9 % (ref 11.5–14.5)
HCT VFR BLD AUTO: 25.4 % (ref 37–48.5)
HGB BLD-MCNC: 8.1 G/DL (ref 12–16)
IMM GRANULOCYTES # BLD AUTO: 0.04 K/UL (ref 0–0.04)
IMM GRANULOCYTES NFR BLD AUTO: 0.7 % (ref 0–0.5)
LYMPHOCYTES # BLD AUTO: 0.6 K/UL (ref 1–4.8)
LYMPHOCYTES NFR BLD: 10.8 % (ref 18–48)
MCH RBC QN AUTO: 28.8 PG (ref 27–31)
MCHC RBC AUTO-ENTMCNC: 31.9 G/DL (ref 32–36)
MCV RBC AUTO: 90 FL (ref 82–98)
MONOCYTES # BLD AUTO: 0.8 K/UL (ref 0.3–1)
MONOCYTES NFR BLD: 15.3 % (ref 4–15)
NEUTROPHILS # BLD AUTO: 3.8 K/UL (ref 1.8–7.7)
NEUTROPHILS NFR BLD: 71 % (ref 38–73)
NRBC BLD-RTO: 0 /100 WBC
PLATELET # BLD AUTO: 397 K/UL (ref 150–450)
PMV BLD AUTO: 10.4 FL (ref 9.2–12.9)
RBC # BLD AUTO: 2.81 M/UL (ref 4–5.4)
WBC # BLD AUTO: 5.37 K/UL (ref 3.9–12.7)

## 2023-03-29 PROCEDURE — 85025 COMPLETE CBC W/AUTO DIFF WBC: CPT | Mod: PO | Performed by: INTERNAL MEDICINE

## 2023-03-29 PROCEDURE — 36415 COLL VENOUS BLD VENIPUNCTURE: CPT | Mod: PO | Performed by: INTERNAL MEDICINE

## 2023-04-04 ENCOUNTER — LAB VISIT (OUTPATIENT)
Dept: LAB | Facility: HOSPITAL | Age: 58
End: 2023-04-04
Attending: INTERNAL MEDICINE
Payer: COMMERCIAL

## 2023-04-04 DIAGNOSIS — I80.8 PHLEBITIS AND THROMBOPHLEBITIS OF SUPERFICIAL VEINS OF UPPER EXTREMITIES: ICD-10-CM

## 2023-04-04 LAB
ALBUMIN SERPL BCP-MCNC: 2.7 G/DL (ref 3.5–5.2)
ALP SERPL-CCNC: 58 U/L (ref 55–135)
ALT SERPL W/O P-5'-P-CCNC: 7 U/L (ref 10–44)
ANION GAP SERPL CALC-SCNC: 11 MMOL/L (ref 8–16)
AST SERPL-CCNC: 11 U/L (ref 10–40)
BASOPHILS # BLD AUTO: 0.05 K/UL (ref 0–0.2)
BASOPHILS NFR BLD: 0.6 % (ref 0–1.9)
BILIRUB SERPL-MCNC: 0.6 MG/DL (ref 0.1–1)
BUN SERPL-MCNC: 7 MG/DL (ref 6–20)
CALCIUM SERPL-MCNC: 8.9 MG/DL (ref 8.7–10.5)
CHLORIDE SERPL-SCNC: 101 MMOL/L (ref 95–110)
CO2 SERPL-SCNC: 23 MMOL/L (ref 23–29)
CREAT SERPL-MCNC: 0.5 MG/DL (ref 0.5–1.4)
DIFFERENTIAL METHOD: ABNORMAL
EOSINOPHIL # BLD AUTO: 0 K/UL (ref 0–0.5)
EOSINOPHIL NFR BLD: 0.2 % (ref 0–8)
ERYTHROCYTE [DISTWIDTH] IN BLOOD BY AUTOMATED COUNT: 19.8 % (ref 11.5–14.5)
EST. GFR  (NO RACE VARIABLE): >60 ML/MIN/1.73 M^2
GLUCOSE SERPL-MCNC: 71 MG/DL (ref 70–110)
HCT VFR BLD AUTO: 26.9 % (ref 37–48.5)
HGB BLD-MCNC: 8.3 G/DL (ref 12–16)
IMM GRANULOCYTES # BLD AUTO: 0.08 K/UL (ref 0–0.04)
IMM GRANULOCYTES NFR BLD AUTO: 0.9 % (ref 0–0.5)
LYMPHOCYTES # BLD AUTO: 1.2 K/UL (ref 1–4.8)
LYMPHOCYTES NFR BLD: 13.3 % (ref 18–48)
MCH RBC QN AUTO: 28.3 PG (ref 27–31)
MCHC RBC AUTO-ENTMCNC: 30.9 G/DL (ref 32–36)
MCV RBC AUTO: 92 FL (ref 82–98)
MONOCYTES # BLD AUTO: 1.9 K/UL (ref 0.3–1)
MONOCYTES NFR BLD: 20.6 % (ref 4–15)
NEUTROPHILS # BLD AUTO: 5.9 K/UL (ref 1.8–7.7)
NEUTROPHILS NFR BLD: 64.4 % (ref 38–73)
NRBC BLD-RTO: 0 /100 WBC
PLATELET # BLD AUTO: 394 K/UL (ref 150–450)
PMV BLD AUTO: 9.9 FL (ref 9.2–12.9)
POTASSIUM SERPL-SCNC: 4.3 MMOL/L (ref 3.5–5.1)
PROT SERPL-MCNC: 5.4 G/DL (ref 6–8.4)
RBC # BLD AUTO: 2.93 M/UL (ref 4–5.4)
SODIUM SERPL-SCNC: 135 MMOL/L (ref 136–145)
WBC # BLD AUTO: 9.09 K/UL (ref 3.9–12.7)

## 2023-04-04 PROCEDURE — 85025 COMPLETE CBC W/AUTO DIFF WBC: CPT | Mod: PO | Performed by: INTERNAL MEDICINE

## 2023-04-04 PROCEDURE — 80053 COMPREHEN METABOLIC PANEL: CPT | Mod: PO | Performed by: INTERNAL MEDICINE

## 2023-05-11 ENCOUNTER — LAB VISIT (OUTPATIENT)
Dept: LAB | Facility: HOSPITAL | Age: 58
End: 2023-05-11
Attending: INTERNAL MEDICINE
Payer: COMMERCIAL

## 2023-05-11 DIAGNOSIS — C85.83 LARGE CELL LYMPHOMA OF INTRA-ABDOMINAL LYMPH NODES: ICD-10-CM

## 2023-05-11 DIAGNOSIS — I80.8 PHLEBITIS AND THROMBOPHLEBITIS OF SUPERFICIAL VEINS OF UPPER EXTREMITIES: ICD-10-CM

## 2023-05-11 LAB
ALBUMIN SERPL BCP-MCNC: 2.3 G/DL (ref 3.5–5.2)
ALP SERPL-CCNC: 75 U/L (ref 55–135)
ALT SERPL W/O P-5'-P-CCNC: 10 U/L (ref 10–44)
ANION GAP SERPL CALC-SCNC: 15 MMOL/L (ref 8–16)
AST SERPL-CCNC: 16 U/L (ref 10–40)
BASOPHILS # BLD AUTO: 0.01 K/UL (ref 0–0.2)
BASOPHILS NFR BLD: 0.2 % (ref 0–1.9)
BILIRUB SERPL-MCNC: 0.3 MG/DL (ref 0.1–1)
BUN SERPL-MCNC: 11 MG/DL (ref 6–20)
CALCIUM SERPL-MCNC: 8.8 MG/DL (ref 8.7–10.5)
CHLORIDE SERPL-SCNC: 100 MMOL/L (ref 95–110)
CO2 SERPL-SCNC: 20 MMOL/L (ref 23–29)
CREAT SERPL-MCNC: 0.7 MG/DL (ref 0.5–1.4)
DIFFERENTIAL METHOD: ABNORMAL
EOSINOPHIL # BLD AUTO: 0 K/UL (ref 0–0.5)
EOSINOPHIL NFR BLD: 0 % (ref 0–8)
ERYTHROCYTE [DISTWIDTH] IN BLOOD BY AUTOMATED COUNT: 18.6 % (ref 11.5–14.5)
EST. GFR  (NO RACE VARIABLE): >60 ML/MIN/1.73 M^2
GLUCOSE SERPL-MCNC: 162 MG/DL (ref 70–110)
HCT VFR BLD AUTO: 27.6 % (ref 37–48.5)
HGB BLD-MCNC: 8.4 G/DL (ref 12–16)
IMM GRANULOCYTES # BLD AUTO: 0.09 K/UL (ref 0–0.04)
IMM GRANULOCYTES NFR BLD AUTO: 1.4 % (ref 0–0.5)
LYMPHOCYTES # BLD AUTO: 1.2 K/UL (ref 1–4.8)
LYMPHOCYTES NFR BLD: 18.9 % (ref 18–48)
MCH RBC QN AUTO: 28.1 PG (ref 27–31)
MCHC RBC AUTO-ENTMCNC: 30.4 G/DL (ref 32–36)
MCV RBC AUTO: 92 FL (ref 82–98)
MONOCYTES # BLD AUTO: 1 K/UL (ref 0.3–1)
MONOCYTES NFR BLD: 14.8 % (ref 4–15)
NEUTROPHILS # BLD AUTO: 4.1 K/UL (ref 1.8–7.7)
NEUTROPHILS NFR BLD: 64.7 % (ref 38–73)
NRBC BLD-RTO: 0 /100 WBC
PLATELET # BLD AUTO: 350 K/UL (ref 150–450)
PMV BLD AUTO: 9.9 FL (ref 9.2–12.9)
POTASSIUM SERPL-SCNC: 3.9 MMOL/L (ref 3.5–5.1)
PROT SERPL-MCNC: 4.8 G/DL (ref 6–8.4)
RBC # BLD AUTO: 2.99 M/UL (ref 4–5.4)
SODIUM SERPL-SCNC: 135 MMOL/L (ref 136–145)
WBC # BLD AUTO: 6.4 K/UL (ref 3.9–12.7)

## 2023-05-11 PROCEDURE — 85025 COMPLETE CBC W/AUTO DIFF WBC: CPT | Mod: PO | Performed by: INTERNAL MEDICINE

## 2023-05-11 PROCEDURE — 80053 COMPREHEN METABOLIC PANEL: CPT | Mod: PO | Performed by: INTERNAL MEDICINE

## 2023-05-16 ENCOUNTER — LAB VISIT (OUTPATIENT)
Dept: LAB | Facility: HOSPITAL | Age: 58
End: 2023-05-16
Attending: INTERNAL MEDICINE
Payer: COMMERCIAL

## 2023-05-16 DIAGNOSIS — C83.38 RETICULOSARCOMA OF LYMPH NODES OF MULTIPLE SITES: ICD-10-CM

## 2023-05-16 DIAGNOSIS — I80.8 PHLEBITIS AND THROMBOPHLEBITIS OF SUPERFICIAL VEINS OF UPPER EXTREMITIES: Primary | ICD-10-CM

## 2023-05-16 DIAGNOSIS — I80.8 PHLEBITIS AND THROMBOPHLEBITIS OF SUPERFICIAL VEINS OF UPPER EXTREMITIES: ICD-10-CM

## 2023-05-16 LAB
ALBUMIN SERPL BCP-MCNC: 2.7 G/DL (ref 3.5–5.2)
ALP SERPL-CCNC: 89 U/L (ref 55–135)
ALT SERPL W/O P-5'-P-CCNC: 13 U/L (ref 10–44)
ANION GAP SERPL CALC-SCNC: 9 MMOL/L (ref 8–16)
AST SERPL-CCNC: 53 U/L (ref 10–40)
BASOPHILS NFR BLD: 0 % (ref 0–1.9)
BILIRUB SERPL-MCNC: 0.8 MG/DL (ref 0.1–1)
BUN SERPL-MCNC: 7 MG/DL (ref 6–20)
CALCIUM SERPL-MCNC: 8.4 MG/DL (ref 8.7–10.5)
CHLORIDE SERPL-SCNC: 99 MMOL/L (ref 95–110)
CO2 SERPL-SCNC: 24 MMOL/L (ref 23–29)
CREAT SERPL-MCNC: 0.5 MG/DL (ref 0.5–1.4)
DIFFERENTIAL METHOD: ABNORMAL
EOSINOPHIL NFR BLD: 0 % (ref 0–8)
ERYTHROCYTE [DISTWIDTH] IN BLOOD BY AUTOMATED COUNT: 20.1 % (ref 11.5–14.5)
EST. GFR  (NO RACE VARIABLE): >60 ML/MIN/1.73 M^2
GLUCOSE SERPL-MCNC: 81 MG/DL (ref 70–110)
HCT VFR BLD AUTO: 29.7 % (ref 37–48.5)
HGB BLD-MCNC: 8.9 G/DL (ref 12–16)
IMM GRANULOCYTES # BLD AUTO: ABNORMAL K/UL (ref 0–0.04)
IMM GRANULOCYTES NFR BLD AUTO: ABNORMAL % (ref 0–0.5)
LYMPHOCYTES NFR BLD: 14 % (ref 18–48)
MCH RBC QN AUTO: 27.6 PG (ref 27–31)
MCHC RBC AUTO-ENTMCNC: 30 G/DL (ref 32–36)
MCV RBC AUTO: 92 FL (ref 82–98)
MONOCYTES NFR BLD: 14 % (ref 4–15)
MYELOCYTES NFR BLD MANUAL: 2 %
NEUTROPHILS NFR BLD: 68 % (ref 38–73)
NEUTS BAND NFR BLD MANUAL: 2 %
NRBC BLD-RTO: 0 /100 WBC
PLATELET # BLD AUTO: 244 K/UL (ref 150–450)
PMV BLD AUTO: 10.7 FL (ref 9.2–12.9)
POTASSIUM SERPL-SCNC: 4 MMOL/L (ref 3.5–5.1)
PROT SERPL-MCNC: 5.2 G/DL (ref 6–8.4)
RBC # BLD AUTO: 3.22 M/UL (ref 4–5.4)
SODIUM SERPL-SCNC: 132 MMOL/L (ref 136–145)
WBC # BLD AUTO: 11.74 K/UL (ref 3.9–12.7)

## 2023-05-16 PROCEDURE — 80053 COMPREHEN METABOLIC PANEL: CPT | Mod: PO | Performed by: INTERNAL MEDICINE

## 2023-05-16 PROCEDURE — 36415 COLL VENOUS BLD VENIPUNCTURE: CPT | Mod: PO | Performed by: INTERNAL MEDICINE

## 2023-05-16 PROCEDURE — 85027 COMPLETE CBC AUTOMATED: CPT | Mod: PO | Performed by: INTERNAL MEDICINE

## 2023-05-16 PROCEDURE — 85007 BL SMEAR W/DIFF WBC COUNT: CPT | Mod: PO | Performed by: INTERNAL MEDICINE

## 2023-05-23 ENCOUNTER — LAB VISIT (OUTPATIENT)
Dept: LAB | Facility: HOSPITAL | Age: 58
End: 2023-05-23
Attending: NURSE PRACTITIONER
Payer: COMMERCIAL

## 2023-05-23 DIAGNOSIS — I80.8 PHLEBITIS AND THROMBOPHLEBITIS OF SUPERFICIAL VEINS OF UPPER EXTREMITIES: ICD-10-CM

## 2023-05-23 LAB
ALBUMIN SERPL BCP-MCNC: 2.2 G/DL (ref 3.5–5.2)
ALP SERPL-CCNC: 78 U/L (ref 55–135)
ALT SERPL W/O P-5'-P-CCNC: 11 U/L (ref 10–44)
ANION GAP SERPL CALC-SCNC: 13 MMOL/L (ref 8–16)
AST SERPL-CCNC: 32 U/L (ref 10–40)
BASOPHILS # BLD AUTO: 0.01 K/UL (ref 0–0.2)
BASOPHILS NFR BLD: 0.3 % (ref 0–1.9)
BILIRUB SERPL-MCNC: 0.6 MG/DL (ref 0.1–1)
BUN SERPL-MCNC: 6 MG/DL (ref 6–20)
CALCIUM SERPL-MCNC: 8.3 MG/DL (ref 8.7–10.5)
CHLORIDE SERPL-SCNC: 100 MMOL/L (ref 95–110)
CO2 SERPL-SCNC: 19 MMOL/L (ref 23–29)
CREAT SERPL-MCNC: 0.5 MG/DL (ref 0.5–1.4)
DIFFERENTIAL METHOD: ABNORMAL
EOSINOPHIL # BLD AUTO: 0 K/UL (ref 0–0.5)
EOSINOPHIL NFR BLD: 0 % (ref 0–8)
ERYTHROCYTE [DISTWIDTH] IN BLOOD BY AUTOMATED COUNT: 20.7 % (ref 11.5–14.5)
EST. GFR  (NO RACE VARIABLE): >60 ML/MIN/1.73 M^2
GLUCOSE SERPL-MCNC: 80 MG/DL (ref 70–110)
HCT VFR BLD AUTO: 26.1 % (ref 37–48.5)
HGB BLD-MCNC: 8 G/DL (ref 12–16)
IMM GRANULOCYTES # BLD AUTO: 0.13 K/UL (ref 0–0.04)
IMM GRANULOCYTES NFR BLD AUTO: 3.5 % (ref 0–0.5)
LYMPHOCYTES # BLD AUTO: 0.9 K/UL (ref 1–4.8)
LYMPHOCYTES NFR BLD: 25.1 % (ref 18–48)
MCH RBC QN AUTO: 28.1 PG (ref 27–31)
MCHC RBC AUTO-ENTMCNC: 30.7 G/DL (ref 32–36)
MCV RBC AUTO: 92 FL (ref 82–98)
MONOCYTES # BLD AUTO: 0.8 K/UL (ref 0.3–1)
MONOCYTES NFR BLD: 22.2 % (ref 4–15)
NEUTROPHILS # BLD AUTO: 1.8 K/UL (ref 1.8–7.7)
NEUTROPHILS NFR BLD: 48.9 % (ref 38–73)
NRBC BLD-RTO: 0 /100 WBC
PLATELET # BLD AUTO: 160 K/UL (ref 150–450)
PMV BLD AUTO: 11.6 FL (ref 9.2–12.9)
POTASSIUM SERPL-SCNC: 3.2 MMOL/L (ref 3.5–5.1)
PROT SERPL-MCNC: 4.6 G/DL (ref 6–8.4)
RBC # BLD AUTO: 2.85 M/UL (ref 4–5.4)
SODIUM SERPL-SCNC: 132 MMOL/L (ref 136–145)
WBC # BLD AUTO: 3.7 K/UL (ref 3.9–12.7)

## 2023-05-23 PROCEDURE — 85025 COMPLETE CBC W/AUTO DIFF WBC: CPT | Mod: PO | Performed by: INTERNAL MEDICINE

## 2023-05-23 PROCEDURE — 80053 COMPREHEN METABOLIC PANEL: CPT | Mod: PO | Performed by: INTERNAL MEDICINE

## 2023-06-05 ENCOUNTER — LAB VISIT (OUTPATIENT)
Dept: LAB | Facility: HOSPITAL | Age: 58
End: 2023-06-05
Attending: INTERNAL MEDICINE
Payer: COMMERCIAL

## 2023-06-05 DIAGNOSIS — I80.8 PHLEBITIS AND THROMBOPHLEBITIS OF SUPERFICIAL VEINS OF UPPER EXTREMITIES: ICD-10-CM

## 2023-06-05 LAB
ALBUMIN SERPL BCP-MCNC: 2 G/DL (ref 3.5–5.2)
ALP SERPL-CCNC: 68 U/L (ref 55–135)
ALT SERPL W/O P-5'-P-CCNC: 7 U/L (ref 10–44)
ANION GAP SERPL CALC-SCNC: 11 MMOL/L (ref 8–16)
AST SERPL-CCNC: 13 U/L (ref 10–40)
BASOPHILS # BLD AUTO: ABNORMAL K/UL (ref 0–0.2)
BASOPHILS NFR BLD: 0 % (ref 0–1.9)
BILIRUB SERPL-MCNC: 0.7 MG/DL (ref 0.1–1)
BUN SERPL-MCNC: 7 MG/DL (ref 6–20)
CALCIUM SERPL-MCNC: 7.9 MG/DL (ref 8.7–10.5)
CHLORIDE SERPL-SCNC: 93 MMOL/L (ref 95–110)
CO2 SERPL-SCNC: 24 MMOL/L (ref 23–29)
CREAT SERPL-MCNC: 0.4 MG/DL (ref 0.5–1.4)
DIFFERENTIAL METHOD: ABNORMAL
EOSINOPHIL # BLD AUTO: ABNORMAL K/UL (ref 0–0.5)
EOSINOPHIL NFR BLD: 0 % (ref 0–8)
ERYTHROCYTE [DISTWIDTH] IN BLOOD BY AUTOMATED COUNT: 19.6 % (ref 11.5–14.5)
EST. GFR  (NO RACE VARIABLE): >60 ML/MIN/1.73 M^2
GLUCOSE SERPL-MCNC: 78 MG/DL (ref 70–110)
HCT VFR BLD AUTO: 19.4 % (ref 37–48.5)
HGB BLD-MCNC: 6.1 G/DL (ref 12–16)
IMM GRANULOCYTES # BLD AUTO: ABNORMAL K/UL (ref 0–0.04)
IMM GRANULOCYTES NFR BLD AUTO: ABNORMAL % (ref 0–0.5)
LYMPHOCYTES # BLD AUTO: ABNORMAL K/UL (ref 1–4.8)
LYMPHOCYTES NFR BLD: 14 % (ref 18–48)
MCH RBC QN AUTO: 28.2 PG (ref 27–31)
MCHC RBC AUTO-ENTMCNC: 31.4 G/DL (ref 32–36)
MCV RBC AUTO: 90 FL (ref 82–98)
MONOCYTES # BLD AUTO: ABNORMAL K/UL (ref 0.3–1)
MONOCYTES NFR BLD: 33 % (ref 4–15)
MYELOCYTES NFR BLD MANUAL: 2 %
NEUTROPHILS NFR BLD: 48 % (ref 38–73)
NRBC BLD-RTO: 0 /100 WBC
PLATELET # BLD AUTO: 60 K/UL (ref 150–450)
PMV BLD AUTO: 12 FL (ref 9.2–12.9)
POTASSIUM SERPL-SCNC: 3.4 MMOL/L (ref 3.5–5.1)
PROMYELOCYTES NFR BLD MANUAL: 3 %
PROT SERPL-MCNC: 4.2 G/DL (ref 6–8.4)
RBC # BLD AUTO: 2.16 M/UL (ref 4–5.4)
SODIUM SERPL-SCNC: 128 MMOL/L (ref 136–145)
WBC # BLD AUTO: 0.73 K/UL (ref 3.9–12.7)

## 2023-06-05 PROCEDURE — 36415 COLL VENOUS BLD VENIPUNCTURE: CPT | Mod: PO | Performed by: INTERNAL MEDICINE

## 2023-06-05 PROCEDURE — 85027 COMPLETE CBC AUTOMATED: CPT | Mod: PO | Performed by: INTERNAL MEDICINE

## 2023-06-05 PROCEDURE — 80053 COMPREHEN METABOLIC PANEL: CPT | Mod: PO | Performed by: INTERNAL MEDICINE

## 2023-06-05 PROCEDURE — 85007 BL SMEAR W/DIFF WBC COUNT: CPT | Mod: PO | Performed by: INTERNAL MEDICINE

## 2023-11-15 NOTE — PROGRESS NOTES
"Phoned patient in response to reply of "2" to post-discharge texting tracker. Ms. Steen is requesting additional oxycodone to cover her until her hospfu appt on 12/29. She was discharged with a 5 days supply on 12/19 and is taking as prescribed. She is still in pain and is concerned she will run out. Sent secure chat to discharging provider who did not reply and to the surgeon's office. Surgeon office contacted patient directly and routed message to doctor.      "
26.3

## (undated) DEVICE — APPLICATOR CHLORAPREP ORN 26ML

## (undated) DEVICE — SUT SILK 0 SUTUPAK SA86H

## (undated) DEVICE — SUT SILK 2-0 STRANDS 30IN

## (undated) DEVICE — PACK BASIC SETUP SC BR

## (undated) DEVICE — SUPPORT ULNA NERVE PROTECTOR

## (undated) DEVICE — DRAPE LAP T SHT W/ INSTR PAD

## (undated) DEVICE — GOWN POLY REINF BRTH SLV XL

## (undated) DEVICE — RELOAD PROXIMATE CUT BLUE 75MM

## (undated) DEVICE — SUT VICRYL 3-0 27 SH

## (undated) DEVICE — CUTTER PROXIMATE BLUE 75MM

## (undated) DEVICE — ELECTRODE REM PLYHSV RETURN 9

## (undated) DEVICE — HEADREST ROUND DISP FOAM 9IN

## (undated) DEVICE — SUT VICRYL PLUS 3-0 SH 18IN

## (undated) DEVICE — DRAPE CORETEMP FLD WRM 56X62IN

## (undated) DEVICE — SPONGE LAP 18X18 PREWASHED

## (undated) DEVICE — COVER LIGHT HANDLE 80/CA

## (undated) DEVICE — SUT 1 36IN PDS II VIO MONO

## (undated) DEVICE — GAUZE SPONGE 4X4 12PLY

## (undated) DEVICE — GLOVE SURGICAL LATEX SZ 7

## (undated) DEVICE — WIRE GD LUB STD 3CM .035 150CM

## (undated) DEVICE — DEVICE ENSEAL X1 LARGE JAW

## (undated) DEVICE — DRESSING GAUZE XEROFORM 5X9

## (undated) DEVICE — TRAY CATH FOL SIL URIMTR 16FR

## (undated) DEVICE — PAD ABD 8X10 STERILE

## (undated) DEVICE — STENT URETERAL UNIV 7FR 26CM
Type: IMPLANTABLE DEVICE | Site: URETER | Status: NON-FUNCTIONAL
Removed: 2022-12-11

## (undated) DEVICE — TOWEL OR DISP STRL BLUE 4/PK

## (undated) DEVICE — DRESSING XEROFORM GAUZE 5X9

## (undated) DEVICE — ELECTRODE BLD EXT 6.50 ST DISP

## (undated) DEVICE — SOL 9P NACL IRR PIC IL

## (undated) DEVICE — SUT 1 48IN PDS II VIO MONO

## (undated) DEVICE — SUT 2/0 30IN SILK BLK BRAI

## (undated) DEVICE — SUT SILK 3-0 STRANDS 30IN

## (undated) DEVICE — MANIFOLD 4 PORT

## (undated) DEVICE — TAPE SURG MEDIPORE 6X72IN

## (undated) DEVICE — UNDERGLOVES BIOGEL PI SZ 7 LF

## (undated) DEVICE — Device

## (undated) DEVICE — CATH POLLACK OPEN-END FLEXI-TI

## (undated) DEVICE — SUT SILK 3-0 SH 18IN BLACK